# Patient Record
Sex: MALE | Race: WHITE | Employment: OTHER | RURAL
[De-identification: names, ages, dates, MRNs, and addresses within clinical notes are randomized per-mention and may not be internally consistent; named-entity substitution may affect disease eponyms.]

---

## 2017-05-31 ENCOUNTER — OFFICE VISIT (OUTPATIENT)
Dept: INTERNAL MEDICINE CLINIC | Age: 82
End: 2017-05-31

## 2017-05-31 VITALS
WEIGHT: 135 LBS | DIASTOLIC BLOOD PRESSURE: 73 MMHG | RESPIRATION RATE: 16 BRPM | OXYGEN SATURATION: 98 % | TEMPERATURE: 98.5 F | HEART RATE: 70 BPM | BODY MASS INDEX: 19.99 KG/M2 | SYSTOLIC BLOOD PRESSURE: 139 MMHG | HEIGHT: 69 IN

## 2017-05-31 DIAGNOSIS — F02.80 DEMENTIA ASSOCIATED WITH OTHER UNDERLYING DISEASE WITHOUT BEHAVIORAL DISTURBANCE (HCC): Primary | ICD-10-CM

## 2017-05-31 NOTE — MR AVS SNAPSHOT
Visit Information Date & Time Provider Department Dept. Phone Encounter #  
 5/31/2017  1:15 PM Andre Puente  [facility] [id number] Follow-up Instructions Return in about 5 months (around 10/31/2017) for routine follow up. Upcoming Health Maintenance Date Due  
 GLAUCOMA SCREENING Q2Y 4/26/1999 Pneumococcal 65+ Low/Medium Risk (2 of 2 - PCV13) 11/13/2015 INFLUENZA AGE 9 TO ADULT 8/1/2017 MEDICARE YEARLY EXAM 10/27/2017 DTaP/Tdap/Td series (2 - Td) 5/11/2025 Allergies as of 5/31/2017  Review Complete On: 5/31/2017 By: Zahra Cheatham LPN Severity Noted Reaction Type Reactions Pcn [Penicillins] Medium 05/22/2012   Topical Hives Pt says not allergic Beef Containing Products  02/24/2016    Rash Current Immunizations  Reviewed on 10/26/2016 Name Date Influenza High Dose Vaccine PF 10/26/2016 Influenza Vaccine 10/17/2013 Influenza Vaccine Geofm Rave) 10/26/2015, 11/13/2014 Influenza Vaccine Split 12/6/2012, 11/16/2011, 10/12/2010 Pneumococcal Polysaccharide (PPSV-23) 11/13/2014 Tdap 5/11/2015 Not reviewed this visit You Were Diagnosed With   
  
 Codes Comments Dementia associated with other underlying disease without behavioral disturbance    -  Primary ICD-10-CM: F02.80 ICD-9-CM: 294.10 Vitals BP Pulse Temp Resp Height(growth percentile) Weight(growth percentile) 139/73 (BP 1 Location: Right arm, BP Patient Position: Sitting) 70 98.5 °F (36.9 °C) (Oral) 16 5' 9\" (1.753 m) 135 lb (61.2 kg) SpO2 BMI Smoking Status 98% 19.94 kg/m2 Never Smoker Vitals History BMI and BSA Data Body Mass Index Body Surface Area 19.94 kg/m 2 1.73 m 2 Preferred Pharmacy Pharmacy Name Phone [address] [facility] [phone] Your Updated Medication List  
  
   
 This list is accurate as of: 5/31/17  1:45 PM.  Always use your most recent med list.  
  
  
  
  
 fluticasone 50 mcg/actuation nasal spray Commonly known as:  Wilbern Jazmín 2 Sprays by Both Nostrils route daily. PROSTATE 2.4 PO Take  by mouth. Follow-up Instructions Return in about 5 months (around 10/31/2017) for routine follow up. Introducing [facility]! Bri Painter introduces Contrib patient portal. Now you can access parts of your medical record, email your doctor's office, and request medication refills online. 1. In your internet browser, go to [email or web address] 2. Click on the First Time User? Click Here link in the Sign In box. You will see the New Member Sign Up page. 3. Enter your Contrib Access Code exactly as it appears below. You will not need to use this code after youve completed the sign-up process. If you do not sign up before the expiration date, you must request a new code. · Contrib Access Code: [email or web address] Expires: 8/29/2017 12:48 PM 
 
4. Enter the last four digits of your Social Security Number (xxxx) and Date of Birth (mm/dd/yyyy) as indicated and click Submit. You will be taken to the next sign-up page. 5. Create a Contrib ID. This will be your Contrib login ID and cannot be changed, so think of one that is secure and easy to remember. 6. Create a Contrib password. You can change your password at any time. 7. Enter your Password Reset Question and Answer. This can be used at a later time if you forget your password. 8. Enter your e-mail address. You will receive e-mail notification when new information is available in [address] 9. Click Sign Up. You can now view and download portions of your medical record. 10. Click the Download Summary menu link to download a portable copy of your medical information.  
 
If you have questions, please visit the Frequently Asked Questions section of the [email or web address]. Remember, [employer] is NOT to be used for urgent needs. For medical emergencies, dial 911. Now available from your iPhone and Android! Please provide this summary of care documentation to your next provider. Your primary care clinician is listed as Darcy Mendez. If you have any questions after today's visit, please call [phone].

## 2017-05-31 NOTE — PROGRESS NOTES
Chief Complaint   Patient presents with    Hypertension     I have reviewed the patient's medical history in detail and updated the computerized patient record. Health Maintenance reviewed. 1. Have you been to the ER, urgent care clinic since your last visit? Hospitalized since your last visit?no    2. Have you seen or consulted any other health care providers outside of the [address] [place] [address] since your last visit? Include any pap smears or colon screening. No    Do you have an [address] in place in the event that you have a healthcare crisis that could impact your decision making as it pertains to your health?no  Would you like information about the [address] [place] [address] given. no

## 2017-05-31 NOTE — PROGRESS NOTES
Subjective:     Sienna Souza is a 80 y.o. male who presents for follow up of hypertension and dementia, we stopped his BP pills. His wife says he is doing okay, he has no complaints. New concerns: per wife memory issues are stable. .     Current Outpatient Prescriptions   Medication Sig Dispense Refill    fluticasone (FLONASE) 50 mcg/actuation nasal spray 2 Sprays by Both Nostrils route daily. 3 Bottle 3    D3/E/SE/SOY ISOFL/TOCOPH/LYCOP (PROSTATE 2.4 PO) Take  by mouth. Allergies   Allergen Reactions    Pcn [Penicillins] Hives     Pt says not allergic    Beef Containing Products Rash       Diet and Lifestyle: nonsmoker    Cardiovascular ROS: not taking medications regularly as instructed, no medication side effects noted, no TIA's, no chest pain on exertion, no dyspnea on exertion, no swelling of ankles. Review of Systems, additional:  Pertinent items are noted in HPI.       Social History   Substance Use Topics    Smoking status: Never Smoker    Smokeless tobacco: Never Used    Alcohol use No        Lab Results  Component Value Date/Time   Cholesterol, total 171 10/26/2016 09:09 AM   HDL Cholesterol 52 10/26/2016 09:09 AM   LDL, calculated 98 10/26/2016 09:09 AM   Triglyceride 107 10/26/2016 09:09 AM       Lab Results  Component Value Date/Time   GFR est  10/26/2016 09:09 AM   GFR est non-AA 91 10/26/2016 09:09 AM   Creatinine 0.65 10/26/2016 09:09 AM   BUN 17 10/26/2016 09:09 AM   Sodium 140 10/26/2016 09:09 AM   Potassium 4.4 10/26/2016 09:09 AM   Chloride 96 10/26/2016 09:09 AM   CO2 28 10/26/2016 09:09 AM      Lab Results   Component Value Date/Time    Glucose 67 10/26/2016 09:09 AM             Objective:     Physical exam significant for the following: thin NAD  Visit Vitals    /73 (BP 1 Location: Right arm, BP Patient Position: Sitting)    Pulse 70    Temp 98.5 °F (36.9 °C) (Oral)    Resp 16    Ht 5' 9\" (1.753 m)    Wt 135 lb (61.2 kg)    SpO2 98%    BMI 19.94 kg/m2     Appearance: alert, well appearing, and in no distress. General exam: CVS exam BP noted to be well controlled today in office, S1, S2 normal, no gallop, no murmur, chest clear, no JVD, no HSM, no edema. .   Assessment/Plan:     hypertension resolved  demantia stable  . Follow-up Disposition:  Return in about 5 months (around 10/31/2017) for routine follow up.

## 2017-08-30 ENCOUNTER — DOCUMENTATION ONLY (OUTPATIENT)
Dept: INTERNAL MEDICINE CLINIC | Age: 82
End: 2017-08-30

## 2017-08-30 ENCOUNTER — OFFICE VISIT (OUTPATIENT)
Dept: INTERNAL MEDICINE CLINIC | Age: 82
End: 2017-08-30

## 2017-08-30 VITALS
WEIGHT: 133 LBS | SYSTOLIC BLOOD PRESSURE: 140 MMHG | TEMPERATURE: 96.6 F | RESPIRATION RATE: 18 BRPM | HEIGHT: 69 IN | BODY MASS INDEX: 19.7 KG/M2 | HEART RATE: 74 BPM | OXYGEN SATURATION: 98 % | DIASTOLIC BLOOD PRESSURE: 69 MMHG

## 2017-08-30 DIAGNOSIS — I45.10 RIGHT BUNDLE BRANCH BLOCK: ICD-10-CM

## 2017-08-30 DIAGNOSIS — J30.1 SEASONAL ALLERGIC RHINITIS DUE TO POLLEN: ICD-10-CM

## 2017-08-30 DIAGNOSIS — Z01.818 PREOPERATIVE GENERAL PHYSICAL EXAMINATION: Primary | ICD-10-CM

## 2017-08-30 DIAGNOSIS — G30.9 ALZHEIMER'S DEMENTIA WITHOUT BEHAVIORAL DISTURBANCE, UNSPECIFIED TIMING OF DEMENTIA ONSET: ICD-10-CM

## 2017-08-30 DIAGNOSIS — F02.80 ALZHEIMER'S DEMENTIA WITHOUT BEHAVIORAL DISTURBANCE, UNSPECIFIED TIMING OF DEMENTIA ONSET: ICD-10-CM

## 2017-08-30 NOTE — MR AVS SNAPSHOT
Visit Information Date & Time Provider Department Dept. Phone Encounter #  
 8/30/2017  9:15 AM Christoph Saleh MD [facility] [id number] Follow-up Instructions Return if symptoms worsen or fail to improve. Your Appointments 9/18/2017  3:15 PM  
Office Visit with Alex Carrillo MD  
[facility] [address] [place] [address]) Appt Note: r/d by Dr. Sheryle Kub, abn ekg, pt needing eye surgery soon,pradeepa  
 [address] [facility]  
[phone] [address] [facility]  
  
    
 11/15/2017  3:30 PM  
PHYSICAL PRE OP with Christoph Saleh MD  
[email or web address] ([facility]) Appt Note: mwv $0 cp lsh 5/31/17  
 [address] [place] [address] Road. [address] [place] [address]  
[phone]  
  
   
 [address] [facility] [address] Upcoming Health Maintenance Date Due  
 GLAUCOMA SCREENING Q2Y 4/26/1999 Pneumococcal 65+ Low/Medium Risk (2 of 2 - PCV13) 11/13/2015 INFLUENZA AGE 9 TO ADULT 8/1/2017 MEDICARE YEARLY EXAM 10/27/2017 DTaP/Tdap/Td series (2 - Td) 5/11/2025 Allergies as of 8/30/2017  Review Complete On: 8/30/2017 By: Kristie Hernandez LPN Severity Noted Reaction Type Reactions Pcn [Penicillins] Medium 05/22/2012   Topical Hives Pt says not allergic Beef Containing Products  02/24/2016    Rash Current Immunizations  Reviewed on 10/26/2016 Name Date Influenza High Dose Vaccine PF 10/26/2016 Influenza Vaccine 10/17/2013 Influenza Vaccine Page Echevaria) 10/26/2015, 11/13/2014 Influenza Vaccine Split 12/6/2012, 11/16/2011, 10/12/2010 Pneumococcal Polysaccharide (PPSV-23) 11/13/2014 Tdap 5/11/2015 Not reviewed this visit You Were Diagnosed With   
  
 Codes Comments Preoperative general physical examination    -  Primary ICD-10-CM: R00.433 ICD-9-CM: V72.83   
 Seasonal allergic rhinitis due to pollen     ICD-10-CM: J30.1 ICD-9-CM: 477.0 Right bundle branch block     ICD-10-CM: I45.10 ICD-9-CM: 426.4 Alzheimer's dementia without behavioral disturbance, unspecified timing of dementia onset     ICD-10-CM: G30.9, F02.80 ICD-9-CM: 331.0, 294.10 Vitals BP Pulse Temp Resp Height(growth percentile) Weight(growth percentile) 140/69 (BP 1 Location: Left arm, BP Patient Position: Sitting) 74 96.6 °F (35.9 °C) (Oral) 18 5' 9\" (1.753 m) 133 lb (60.3 kg) SpO2 BMI Smoking Status 98% 19.64 kg/m2 Never Smoker Vitals History BMI and BSA Data Body Mass Index Body Surface Area 19.64 kg/m 2 1.71 m 2 Preferred Pharmacy Pharmacy Name Phone [address], [facility] [phone] Your Updated Medication List  
  
   
This list is accurate as of: 8/30/17 10:09 AM.  Always use your most recent med list.  
  
  
  
  
 fluticasone 50 mcg/actuation nasal spray Commonly known as:  Keyanna Mak 2 Sprays by Both Nostrils route daily. PROSTATE 2.4 PO Take  by mouth. We Performed the Following AMB POC EKG ROUTINE W/ 12 LEADS, INTER & REP [[address] CPT(R)] REFERRAL TO CARDIOLOGY [UZI55 Custom] Follow-up Instructions Return if symptoms worsen or fail to improve. Referral Information Referral ID Referred By Referred To  
  
 [id number] Rob FLETCHER MD   
   [address] Phone: [phone] Fax: [phone] Visits Status Start Date End Date 1 New Request 8/30/17 8/30/18 If your referral has a status of pending review or denied, additional information will be sent to support the outcome of this decision. Introducing [facility]!    
 Danae Stone introduces [employer] patient portal. Now you can access parts of your medical record, email your doctor's office, and request medication refills online. 1. In your internet browser, go to [email or web address] 2. Click on the First Time User? Click Here link in the Sign In box. You will see the New Member Sign Up page. 3. Enter your [employer] Access Code exactly as it appears below. You will not need to use this code after youve completed the sign-up process. If you do not sign up before the expiration date, you must request a new code. · [employer] Access Code: [email or web address] Expires: 11/28/2017  8:50 AM 
 
4. Enter the last four digits of your Social Security Number (xxxx) and Date of Birth (mm/dd/yyyy) as indicated and click Submit. You will be taken to the next sign-up page. 5. Create a [employer] ID. This will be your [employer] login ID and cannot be changed, so think of one that is secure and easy to remember. 6. Create a [employer] password. You can change your password at any time. 7. Enter your Password Reset Question and Answer. This can be used at a later time if you forget your password. 8. Enter your e-mail address. You will receive e-mail notification when new information is available in [address] 9. Click Sign Up. You can now view and download portions of your medical record. 10. Click the Download Summary menu link to download a portable copy of your medical information. If you have questions, please visit the Frequently Asked Questions section of the [employer] website. Remember, [employer] is NOT to be used for urgent needs. For medical emergencies, dial 911. Now available from your iPhone and Android! Please provide this summary of care documentation to your next provider. Your primary care clinician is listed as Gisela Murguia. If you have any questions after today's visit, please call [phone].

## 2017-08-30 NOTE — PROGRESS NOTES
Preoperative Evaluation    Date of Exam: 2017    Shannan Mcgraw is a 80 y.o. male (:1934) who presents for preoperative evaluation. Can't drive at night and [employer] has send him to have his catarcts done. Memory issues are stable per wife. 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  D3/E/SE/SOY ISOFL/TOCOPH/LYCOP (PROSTATE 2.4 PO) Take  by mouth. No current facility-administered medications for this visit.       Surgical History:     Past Surgical History:   Procedure Laterality Date    HX CAROTID ENDARTERECTOMY      Rt.    HX HERNIA REPAIR  1985    WY COLONOSCOPY FLX DX W/COLLJ SPEC WHEN PFRMD  2010          Social History:     Social History     Social History    Marital status:      Spouse name: N/A    Number of children: 4    Years of education: N/A     Occupational History     Retired     Social History Main Topics    Smoking status: Never Smoker    Smokeless tobacco: Never Used    Alcohol use No    Drug use: None    Sexual activity: Not Currently     Other Topics Concern    None     Social History Narrative    Lives with wife       Anesthesia Complications: None  History of abnormal bleeding : None  History of Blood Transfusions: no  Health Care Directive or Living Will: no    Objective:     Visit Vitals    /69 (BP 1 Location: Left arm, BP Patient Position: Sitting)    Pulse 74    Temp 96.6 °F (35.9 °C) (Oral)    Resp 18    Ht 5' 9\" (1.753 m)    Wt 133 lb (60.3 kg)    SpO2 98%    BMI 19.64 kg/m2     WD WN male NAD elderly reasonable historian usu MS  Heart RRR without murmers clicks or rubs  Lungs CTA  Abdo soft nontender  Ext no edema        DIAGNOSTICS:   1. EKG: EKG FINDINGS - normal sinus rhythm, RBBB  3. Labs:   Lab Results  Component Value Date/Time   Cholesterol, total 171 10/26/2016 09:09 AM   HDL Cholesterol 52 10/26/2016 09:09 AM   LDL, calculated 98 10/26/2016 09:09 AM   Triglyceride 107 10/26/2016 09:09 AM     Lab Results  Component Value Date/Time   GFR est non-AA 91 10/26/2016 09:09 AM   GFR est  10/26/2016 09:09 AM   Creatinine 0.65 10/26/2016 09:09 AM   BUN 17 10/26/2016 09:09 AM   Sodium 140 10/26/2016 09:09 AM   Potassium 4.4 10/26/2016 09:09 AM   Chloride 96 10/26/2016 09:09 AM   CO2 28 10/26/2016 09:09 AM     Lab Results   Component Value Date/Time    Glucose 67 10/26/2016 09:09 AM              IMPRESSION:   RBBB on EKG, needs to see cards for further clearance, will cancel his surgery until cards clears him      ICD-10-CM ICD-9-CM    1. Preoperative general physical examination Z01.818 V72.83 AMB POC EKG ROUTINE W/ 12 LEADS, INTER & REP      REFERRAL TO CARDIOLOGY   2. Seasonal allergic rhinitis due to pollen J30.1 477.0    3. Right bundle branch block I45.10 426.4 REFERRAL TO CARDIOLOGY   4.  Alzheimer's dementia without behavioral disturbance, unspecified timing of dementia onset G30.9 331.0     F02.80 294.10      Orders Placed This Encounter    REFERRAL TO CARDIOLOGY     Referral Priority:   Routine     Referral Type:   Consultation     Referral Reason:   Specialty Services Required     Referred to Provider:   Marquez Montaño MD    AMB POC EKG ROUTINE W/ 12 LEADS, INTER & REP     Order Specific Question:   Reason for Exam:     Answer:   pre oip       Surgery should be delayed until cleared by his cardiologist     Karen Fernandez MD   8/30/2017

## 2017-09-05 ENCOUNTER — TELEPHONE (OUTPATIENT)
Dept: INTERNAL MEDICINE CLINIC | Age: 82
End: 2017-09-05

## 2017-09-17 ENCOUNTER — APPOINTMENT (OUTPATIENT)
Dept: GENERAL RADIOLOGY | Age: 82
End: 2017-09-17
Attending: EMERGENCY MEDICINE
Payer: MEDICARE

## 2017-09-17 ENCOUNTER — HOSPITAL ENCOUNTER (EMERGENCY)
Age: 82
Discharge: HOME | End: 2017-09-17
Attending: EMERGENCY MEDICINE
Payer: MEDICARE

## 2017-09-17 VITALS
HEIGHT: 70 IN | HEART RATE: 74 BPM | WEIGHT: 132.06 LBS | TEMPERATURE: 97.5 F | BODY MASS INDEX: 18.91 KG/M2 | RESPIRATION RATE: 17 BRPM | SYSTOLIC BLOOD PRESSURE: 145 MMHG | OXYGEN SATURATION: 99 % | DIASTOLIC BLOOD PRESSURE: 60 MMHG

## 2017-09-17 DIAGNOSIS — I95.1 ORTHOSTATIC SYNCOPE: Primary | ICD-10-CM

## 2017-09-17 LAB
ANION GAP SERPL CALC-SCNC: 6 MMOL/L (ref 5–15)
BUN SERPL-MCNC: 17 MG/DL (ref 6–20)
BUN/CREAT SERPL: 18 (ref 12–20)
CALCIUM SERPL-MCNC: 8.9 MG/DL (ref 8.5–10.1)
CHLORIDE SERPL-SCNC: 104 MMOL/L (ref 97–108)
CO2 SERPL-SCNC: 31 MMOL/L (ref 21–32)
CREAT SERPL-MCNC: 0.96 MG/DL (ref 0.7–1.3)
ERYTHROCYTE [DISTWIDTH] IN BLOOD BY AUTOMATED COUNT: 13.5 % (ref 11.5–14.5)
GLUCOSE SERPL-MCNC: 89 MG/DL (ref 65–100)
HCT VFR BLD AUTO: 42.6 % (ref 36.6–50.3)
HGB BLD-MCNC: 14.5 G/DL (ref 12.1–17)
MAGNESIUM SERPL-MCNC: 2.5 MG/DL (ref 1.6–2.4)
MCH RBC QN AUTO: 31.9 PG (ref 26–34)
MCHC RBC AUTO-ENTMCNC: 34 G/DL (ref 30–36.5)
MCV RBC AUTO: 93.6 FL (ref 80–99)
PLATELET # BLD AUTO: 198 K/UL (ref 150–400)
POTASSIUM SERPL-SCNC: 4.1 MMOL/L (ref 3.5–5.1)
RBC # BLD AUTO: 4.55 M/UL (ref 4.1–5.7)
SODIUM SERPL-SCNC: 141 MMOL/L (ref 136–145)
TROPONIN I SERPL-MCNC: <0.04 NG/ML
TROPONIN I SERPL-MCNC: <0.04 NG/ML
WBC # BLD AUTO: 7.4 K/UL (ref 4.1–11.1)

## 2017-09-17 PROCEDURE — 93005 ELECTROCARDIOGRAM TRACING: CPT

## 2017-09-17 PROCEDURE — 80048 BASIC METABOLIC PNL TOTAL CA: CPT | Performed by: EMERGENCY MEDICINE

## 2017-09-17 PROCEDURE — 99285 EMERGENCY DEPT VISIT HI MDM: CPT

## 2017-09-17 PROCEDURE — 74011250636 HC RX REV CODE- 250/636: Performed by: EMERGENCY MEDICINE

## 2017-09-17 PROCEDURE — 84484 ASSAY OF TROPONIN QUANT: CPT | Performed by: EMERGENCY MEDICINE

## 2017-09-17 PROCEDURE — 85027 COMPLETE CBC AUTOMATED: CPT | Performed by: EMERGENCY MEDICINE

## 2017-09-17 PROCEDURE — 71020 XR CHEST PA LAT: CPT

## 2017-09-17 PROCEDURE — 83735 ASSAY OF MAGNESIUM: CPT | Performed by: EMERGENCY MEDICINE

## 2017-09-17 PROCEDURE — 96361 HYDRATE IV INFUSION ADD-ON: CPT

## 2017-09-17 PROCEDURE — 36415 COLL VENOUS BLD VENIPUNCTURE: CPT | Performed by: EMERGENCY MEDICINE

## 2017-09-17 PROCEDURE — 96360 HYDRATION IV INFUSION INIT: CPT

## 2017-09-17 RX ORDER — ASPIRIN 81 MG/1
81 TABLET ORAL
COMMUNITY

## 2017-09-17 RX ADMIN — SODIUM CHLORIDE 1000 ML: 900 INJECTION, SOLUTION INTRAVENOUS at 12:24

## 2017-09-17 NOTE — ED NOTES
Per wife, who is at bedside, patient was in Religious and suddenly had a syncopal episode. Patient states he did not hit the floor, as he fell in his chair and Yazidism assisted him with sitting up in the chair. Patient denies hitting his head. His wife reports increased stress in patient's life because he recently failed his EKG and has to follow up with a cardiologist tomorrow. Patient resting comfortably in stretcher with call bell within reach. Side rails up x2, placed on the monitor x3. Wife and daughter at bedside.

## 2017-09-17 NOTE — ED PROVIDER NOTES
HPI Comments: Frandy Carrera is a 80 y.o. male with a pertinent PMHx of CA and HLD who presents ambulatory to the ED for evaluation after a syncopal episode which occurred this morning. Pt explains that he was attending [employer] service when he was attempting to stand at the [facility]. He notes that he almost fell while attempting to stand and his wife caught him before he hit the ground. He states that he was diaphoretic at the time of the incident, but he denies feeling lightheaded or short of breath during, before, or after the episode. He notes that the EMT at his [employer] noticed that his BP had dropped significantly during the episode. Pt states that he is currently asymptomatic. He reports that he was recently evaluated by his PCP who referred him to Dr. Smith White for a failed EKG. He notes that he has an appointment with Dr. Ju Ospina tomorrow. Pt reports that he has been having normal PO intake. Pt specifically denies HA, chest pain, lightheadedness, SOB, N/V, nor hitting his head during the episode. Social hx: - Tobacco use, - EtOH use    PCP: Joy Walters MD  Cardiology: Smith White MD    There are no other complaints, changes or physical findings at this time. The history is provided by the patient. No  was used.         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    HX HERNIA REPAIR  6/1/1985    GA COLONOSCOPY FLX DX W/COLLJ SPEC WHEN PFRMD  6/30/2010              Family History:   Problem Relation Age of Onset    Stroke Mother     Heart Disease Mother     Heart Disease Father        Social History     Social History    Marital status:      Spouse name: N/A    Number of children: 4    Years of education: N/A     Occupational History    Retired     Social History Main Topics    Smoking status: Never Smoker    Smokeless tobacco: Never Used    Alcohol use No    Drug use: Not on file    Sexual activity: Not Currently     Other Topics Concern    Not on file     Social History Narrative    Lives with wife         ALLERGIES: Pcn [penicillins] and Beef containing products    Review of Systems   Constitutional: Negative for chills and fever. HENT: Negative for congestion, ear pain, rhinorrhea, sore throat and trouble swallowing. Eyes: Negative for visual disturbance. Respiratory: Negative for cough, chest tightness and shortness of breath. Cardiovascular: Negative for chest pain and palpitations. Gastrointestinal: Negative for abdominal pain, blood in stool, constipation, diarrhea, nausea and vomiting. Genitourinary: Negative for decreased urine volume, difficulty urinating, dysuria and frequency. Musculoskeletal: Negative for back pain and neck pain. Skin: Negative for color change and rash. Neurological: Positive for syncope. Negative for dizziness, weakness, light-headedness and headaches. Patient Vitals for the past 12 hrs:   Temp Pulse Resp BP SpO2   09/17/17 1402 - 68 16 123/43 98 %   09/17/17 1158 - 67 15 131/70 96 %   09/17/17 1033 97.5 °F (36.4 °C) 64 16 166/83 96 %              Physical Exam   Constitutional: He is oriented to person, place, and time. Vital signs are normal. He appears well-developed and well-nourished. He does not appear ill. No distress. HENT:   Mouth/Throat: Oropharynx is clear and moist.   Eyes: Conjunctivae are normal.   Neck: Neck supple. Cardiovascular: Normal rate and regular rhythm. Pulmonary/Chest: Effort normal and breath sounds normal. No accessory muscle usage. No respiratory distress. Abdominal: Soft. He exhibits no distension. There is no tenderness. Lymphadenopathy:     He has no cervical adenopathy. Neurological: He is alert and oriented to person, place, and time.  He has normal strength. No cranial nerve deficit or sensory deficit. Skin: Skin is warm and dry. Nursing note and vitals reviewed. MDM  Number of Diagnoses or Management Options  Orthostatic syncope:   Diagnosis management comments: DDx: vasovagal syncope, orthostatic syncope, coronary disease, dehydration, electrolyte abnormality    Follow-up scheduled tomorrow with cardiology. Considering great follow-up and no acute findings today, as well as evidence that points more towards orthostatic syncope, I will discharge him home today. Amount and/or Complexity of Data Reviewed  Clinical lab tests: ordered and reviewed  Tests in the radiology section of CPT®: ordered and reviewed  Tests in the medicine section of CPT®: ordered and reviewed  Review and summarize past medical records: yes    Patient Progress  Patient progress: stable    ED Course       Procedures     EKG interpretation: (Preliminary) 10:24  Rhythm: normal sinus rhythm; and regular . Rate (approx.): 65 bpm; Axis: normal; TX interval: normal; QRS interval: normal ; ST/T wave: normal; Other findings: RBBB.   Written by Milton Stubbs, ED Scribe, as dictated by Suad Joseph MD.      LABORATORY TESTS:  Recent Results (from the past 12 hour(s))   EKG, 12 LEAD, INITIAL    Collection Time: 09/17/17 10:24 AM   Result Value Ref Range    Ventricular Rate 65 BPM    Atrial Rate 65 BPM    P-R Interval 186 ms    QRS Duration 140 ms    Q-T Interval 414 ms    QTC Calculation (Bezet) 430 ms    Calculated P Axis 67 degrees    Calculated R Axis 88 degrees    Calculated T Axis 63 degrees    Diagnosis       Normal sinus rhythm  Right bundle branch block  Abnormal ECG  When compared with ECG of 11-NOV-2002 10:17,  Previous ECG has undetermined rhythm, needs review     CBC W/O DIFF    Collection Time: 09/17/17 11:48 AM   Result Value Ref Range    WBC 7.4 4.1 - 11.1 K/uL    RBC 4.55 4.10 - 5.70 M/uL    HGB 14.5 12.1 - 17.0 g/dL    HCT 42.6 36.6 - 50.3 %    MCV 93.6 80.0 - 99.0 FL    MCH 31.9 26.0 - 34.0 PG    MCHC 34.0 30.0 - 36.5 g/dL    RDW 13.5 11.5 - 14.5 %    PLATELET 137 925 - 524 K/uL   MAGNESIUM    Collection Time: 09/17/17 11:48 AM   Result Value Ref Range    Magnesium 2.5 (H) 1.6 - 2.4 mg/dL   METABOLIC PANEL, BASIC    Collection Time: 09/17/17 11:48 AM   Result Value Ref Range    Sodium 141 136 - 145 mmol/L    Potassium 4.1 3.5 - 5.1 mmol/L    Chloride 104 97 - 108 mmol/L    CO2 31 21 - 32 mmol/L    Anion gap 6 5 - 15 mmol/L    Glucose 89 65 - 100 mg/dL    BUN 17 6 - 20 MG/DL    Creatinine 0.96 0.70 - 1.30 MG/DL    BUN/Creatinine ratio 18 12 - 20      GFR est AA >60 >60 ml/min/1.73m2    GFR est non-AA >60 >60 ml/min/1.73m2    Calcium 8.9 8.5 - 10.1 MG/DL   TROPONIN I    Collection Time: 09/17/17 11:48 AM   Result Value Ref Range    Troponin-I, Qt. <0.04 <0.05 ng/mL   TROPONIN I    Collection Time: 09/17/17  1:49 PM   Result Value Ref Range    Troponin-I, Qt. <0.04 <0.05 ng/mL       IMAGING RESULTS:    CXR Results  (Last 48 hours)               09/17/17 1136  XR CHEST PA LAT Final result    Impression:  IMPRESSION: No acute disease. Narrative:  INDICATION: syncope       EXAM: CXR 2 View       FINDINGS: Frontal and lateral views of the chest show lungs are hyperinflated,   possibly emphysematous, without acute process seen. Heart size is normal. There   is no pulmonary edema. There is no evident pneumothorax, adenopathy or effusion. MEDICATIONS GIVEN:  Medications   sodium chloride 0.9 % bolus infusion 1,000 mL (0 mL IntraVENous IV Completed 9/17/17 2039)       IMPRESSION:  1. Orthostatic syncope        PLAN:  1. Follow-up Information     Follow up With Details Comments [address], [place] Go in 1 day  [address] [facility]  [phone]          Return to ED if worse     DISCHARGE NOTE  3:00 PM  The patient has been re-evaluated and is ready for discharge.  Reviewed available results with patient. Counseled patient on diagnosis and care plan. Patient has expressed understanding, and all questions have been answered. Patient agrees with plan and agrees to follow up as recommended, or return to the ED if their symptoms worsen. Discharge instructions have been provided and explained to the patient, along with reasons to return to the ED. ATTESTATION:  This note is prepared by Emiliana Bustamante, acting as Scribe for Shalom Vivas MD.    Shalom Vivas MD: The scribe's documentation has been prepared under my direction and personally reviewed by me in its entirety. I confirm that the note above accurately reflects all work, treatment, procedures, and medical decision making performed by me. This note will not be viewable in [address]

## 2017-09-17 NOTE — DISCHARGE INSTRUCTIONS
Orthostatic Hypotension: Care Instructions  Your Care Instructions  Orthostatic hypotension is a quick drop in blood pressure. It happens when you get up from sitting or lying down. You may feel faint, lightheaded, or dizzy. When a person sits up or stands up, the body changes the way it pumps blood. This can slow the flow of blood to the brain for a very short time. And that can make you feel lightheaded. Many medicines can cause this problem, especially in older people. Lack of fluids (dehydration) or illnesses such as diabetes or heart disease also can cause it. Follow-up care is a key part of your treatment and safety. Be sure to make and go to all appointments, and call your doctor if you are having problems. It's also a good idea to know your test results and keep a list of the medicines you take. How can you care for yourself at home? · Tell your doctor about any problems you have with your medicines. · If your doctor prescribes medicine to help prevent a low blood pressure problem, take it exactly as prescribed. Call your doctor if you think you are having a problem with your medicine. · Drink plenty of fluids, enough so that your urine is light yellow or clear like water. Choose water and other caffeine-free clear liquids. If you have kidney, heart, or liver disease and have to limit fluids, talk with your doctor before you increase the amount of fluids you drink. · Limit or avoid alcohol and caffeine. · Get up slowly from bed or after sitting for a long time. If you are in bed, roll to your side and swing your legs over the edge of the bed and onto the floor. Push your body up to a sitting position. Wait for a while before you slowly stand up. If you are dizzy or lightheaded, sit or lie down. When should you call for help? Call 911 anytime you think you may need emergency care. For example, call if:  · You passed out (lost consciousness).   Watch closely for changes in your health, and be sure to contact your doctor if:  · You do not get better as expected. Where can you learn more? Go to http://frandy-jose.info/. Enter M757 in the search box to learn more about \"Orthostatic Hypotension: Care Instructions. \"  Current as of: April 3, 2017  Content Version: 11.3  © 2996-0836 [employer]. Care instructions adapted under license by [employer] (which disclaims liability or warranty for this information). If you have questions about a medical condition or this instruction, always ask your healthcare professional. [address] any warranty or liability for your use of this information.

## 2017-09-17 NOTE — ED NOTES
MD Lisa Benton has reviewed discharge instructions with the patient. The patient verbalized understanding. Pt discharged with written instructions. No further concerns at this time. IV removed. Pt given prescriptions and wheeled out to exit with family.

## 2017-09-18 ENCOUNTER — OFFICE VISIT (OUTPATIENT)
Dept: CARDIOLOGY CLINIC | Age: 82
End: 2017-09-18

## 2017-09-18 VITALS
SYSTOLIC BLOOD PRESSURE: 118 MMHG | WEIGHT: 135.7 LBS | OXYGEN SATURATION: 98 % | RESPIRATION RATE: 18 BRPM | HEART RATE: 72 BPM | BODY MASS INDEX: 19.43 KG/M2 | DIASTOLIC BLOOD PRESSURE: 60 MMHG | HEIGHT: 70 IN

## 2017-09-18 DIAGNOSIS — R55 SYNCOPE, UNSPECIFIED SYNCOPE TYPE: ICD-10-CM

## 2017-09-18 DIAGNOSIS — G30.9 ALZHEIMER'S DEMENTIA WITHOUT BEHAVIORAL DISTURBANCE, UNSPECIFIED TIMING OF DEMENTIA ONSET: ICD-10-CM

## 2017-09-18 DIAGNOSIS — R01.1 HEART MURMUR: ICD-10-CM

## 2017-09-18 DIAGNOSIS — F02.80 ALZHEIMER'S DEMENTIA WITHOUT BEHAVIORAL DISTURBANCE, UNSPECIFIED TIMING OF DEMENTIA ONSET: ICD-10-CM

## 2017-09-18 DIAGNOSIS — I65.29 STENOSIS OF CAROTID ARTERY, UNSPECIFIED LATERALITY: ICD-10-CM

## 2017-09-18 DIAGNOSIS — I45.10 RBBB: Primary | ICD-10-CM

## 2017-09-18 PROBLEM — R94.31 ABNORMAL EKG: Status: ACTIVE | Noted: 2017-09-18

## 2017-09-18 LAB
ATRIAL RATE: 65 BPM
CALCULATED P AXIS, ECG09: 67 DEGREES
CALCULATED R AXIS, ECG10: 88 DEGREES
CALCULATED T AXIS, ECG11: 63 DEGREES
DIAGNOSIS, 93000: NORMAL
P-R INTERVAL, ECG05: 186 MS
Q-T INTERVAL, ECG07: 414 MS
QRS DURATION, ECG06: 140 MS
QTC CALCULATION (BEZET), ECG08: 430 MS
VENTRICULAR RATE, ECG03: 65 BPM

## 2017-09-18 NOTE — PROGRESS NOTES
Subjective/HPI:     Anthony Smith is a 80 y.o. male is here for new patient consultation. The patient has medical hx significant for carotid artery stenosis with endarterectomy many years ago per pt/wife. The pt presents today for cardiac clearance to have cataract surgery. He was noted on his ekg to have a RBBB without hx of cardiac w/u. This was further complicated by a near syncopal event yesterday while in [employer]. He was standing up in the pew, became unbalanced. He had an EMT in the pew behind him who checked BP and said it was low, he was sweating/clammy and he went to ED for further evaluation. W/u in ED was neg and he was felt to likely have had an orthostatic event. In general he has been healthy, on no meds, and has not had complaints of CP/SOB, palpitations, dizziness, edema, orthopnea, or PND. Family med hx significant for CAD. 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    HX HERNIA REPAIR  6/1/1985    AL COLONOSCOPY FLX DX W/COLLJ SPEC WHEN PFRMD  6/30/2010          Allergies   Allergen Reactions    Pcn [Penicillins] Hives     Pt says not allergic    Beef Containing Products Rash      Family History   Problem Relation Age of Onset    Stroke Mother     Heart Disease Mother     Heart Disease Father       Current Outpatient Prescriptions   Medication Sig    aspirin delayed-release 81 mg tablet Take 81 mg by mouth daily.     fluticasone (FLONASE) 50 mcg/actuation nasal spray 2 Sprays by Both Nostrils route daily. No current facility-administered medications for this visit. Vitals:    09/18/17 1441 09/18/17 1456   BP: 120/60 118/60   Pulse: 72    Resp: 18    SpO2: 98%    Weight: 135 lb 11.2 oz (61.6 kg)    Height: 5' 10\" (1.778 m)        I have reviewed the nurses notes, vitals, problem list, allergy list, medical history, family, social history and medications. Review of Symptoms:  General: Pt denies excessive weight gain or loss. HEENT: +cataract right eye, denies headaches, epistaxis and difficulty swallowing. Respiratory: Denies shortness of breath, SIMPSON, wheezing or stridor. Cardiovascular: Denies precordial pain, palpitations, edema or PND  Gastrointestinal: Denies poor appetite, indigestion, abdominal pain or blood in stool  Urinary: Denies dysuria, pyuria  Musculoskeletal: Denies pain or swelling from muscles or joints  Neurologic: Denies tremor, paresthesias, +near syncope  Skin: Denies rash, itching or texture change. Psych: Denies depression        Physical Exam:      General: Well developed, cooperative, alert in no acute distress, appears states age. HEENT: Supple, No carotid bruits, no JVD, trach is midline. PERRL, EOM intact  Heart:  Normal S1/S2 negative S3 or S4. Regular,+JOY, no gallop or rub.   Respiratory: Clear bilaterally x 4, no wheezing or rales  Abdomen:   Soft, non-tender, no masses, bowel sounds are active.   Extremities:  No edema, normal cap refill, no cyanosis, atraumatic. Neuro: A&Ox3, speech clear, gait stable. Skin: Skin color is normal. No rashes or lesions.  Non diaphoretic  Vascular: 2+ pulses symmetric in all extremities    Cardiographics    ECG: SR, HR 68  RBBB    Results for orders placed or performed during the hospital encounter of 09/17/17   EKG, 12 LEAD, INITIAL   Result Value Ref Range    Ventricular Rate 65 BPM    Atrial Rate 65 BPM    P-R Interval 186 ms    QRS Duration 140 ms    Q-T Interval 414 ms    QTC Calculation (Bezet) 430 ms    Calculated P Axis 67 degrees    Calculated R Axis 88 degrees    Calculated T Axis 63 degrees    Diagnosis       Normal sinus rhythm  Right bundle branch block  Abnormal ECG  When compared with ECG of 11-NOV-2002 10:17,  Previous ECG has undetermined rhythm, needs review  Confirmed by Janae Abdullahi ([id number]) on 9/18/2017 3:26:06 PM           Cardiology Labs:  Lab Results   Component Value Date/Time    Cholesterol, total 171 10/26/2016 09:09 AM    HDL Cholesterol 52 10/26/2016 09:09 AM    LDL, calculated 98 10/26/2016 09:09 AM    Triglyceride 107 10/26/2016 09:09 AM       Lab Results   Component Value Date/Time    Sodium 141 09/17/2017 11:48 AM    Potassium 4.1 09/17/2017 11:48 AM    Chloride 104 09/17/2017 11:48 AM    CO2 31 09/17/2017 11:48 AM    Anion gap 6 09/17/2017 11:48 AM    Glucose 89 09/17/2017 11:48 AM    BUN 17 09/17/2017 11:48 AM    Creatinine 0.96 09/17/2017 11:48 AM    BUN/Creatinine ratio 18 09/17/2017 11:48 AM    GFR est AA >60 09/17/2017 11:48 AM    GFR est non-AA >60 09/17/2017 11:48 AM    Calcium 8.9 09/17/2017 11:48 AM    AST (SGOT) 27 10/26/2015 10:37 AM    Alk. phosphatase 74 10/26/2015 10:37 AM    Protein, total 6.5 10/26/2015 10:37 AM    Albumin 4.4 10/26/2015 10:37 AM    A-G Ratio 2.1 10/26/2015 10:37 AM    ALT (SGPT) 12 10/26/2015 10:37 AM           Assessment:     Assessment:      Diagnoses and all orders for this visit:    1. RBBB  -     AMB POC EKG ROUTINE W/ 12 LEADS, INTER & REP  -     2D ECHO COMPLETE ADULT (TTE) W OR WO CONTR; Future    2. Syncope, unspecified syncope type  -     AMB POC EKG ROUTINE W/ 12 LEADS, INTER & REP  -     2D ECHO COMPLETE ADULT (TTE) W OR WO CONTR; Future    3. Stenosis of carotid artery, unspecified laterality  -     AMB POC EKG ROUTINE W/ 12 LEADS, INTER & REP  -     2D ECHO COMPLETE ADULT (TTE) W OR WO CONTR; Future    4.  Alzheimer's dementia without behavioral disturbance, unspecified timing of dementia onset  -     AMB POC EKG ROUTINE W/ 12 LEADS, INTER & REP  -     2D ECHO COMPLETE ADULT (TTE) W OR WO CONTR; Future    5. Heart murmur  -     AMB POC EKG ROUTINE W/ 12 LEADS, INTER & REP  -     2D ECHO COMPLETE ADULT (TTE) W OR WO CONTR; Future      Specialty Problems        Cardiology Problems    Stenosis of carotid artery        Abnormal EKG              ICD-10-CM ICD-9-CM    1. RBBB I45.10 426.4 AMB POC EKG ROUTINE W/ 12 LEADS, INTER & REP      2D ECHO COMPLETE ADULT (TTE) W OR WO CONTR   2. Syncope, unspecified syncope type R55 780.2 AMB POC EKG ROUTINE W/ 12 LEADS, INTER & REP      2D ECHO COMPLETE ADULT (TTE) W OR WO CONTR   3. Stenosis of carotid artery, unspecified laterality I65.29 433.10 AMB POC EKG ROUTINE W/ 12 LEADS, INTER & REP      2D ECHO COMPLETE ADULT (TTE) W OR WO CONTR   4. Alzheimer's dementia without behavioral disturbance, unspecified timing of dementia onset G30.9 331.0 AMB POC EKG ROUTINE W/ 12 LEADS, INTER & REP    F02.80 294.10 2D ECHO COMPLETE ADULT (TTE) W OR WO CONTR   5. Heart murmur R01.1 785.2 AMB POC EKG ROUTINE W/ 12 LEADS, INTER & REP      2D ECHO COMPLETE ADULT (TTE) W OR WO CONTR     Orders Placed This Encounter    AMB POC EKG ROUTINE W/ 12 LEADS, INTER & REP     Order Specific Question:   Reason for Exam:     Answer:   routine    2D ECHO COMPLETE ADULT (TTE) W OR WO CONTR     Standing Status:   Future     Standing Expiration Date:   3/20/2018     Order Specific Question:   Reason for Exam:     Answer:   RBBB, heart murmur       PLAN:    Patient presents with recent near syncopal event, rbbb and need for cardiac clearance for cataract surgery. He has a heart murmur and has never had a cardiac w/u. BP is normotensive. I will evaluate with an echo. Follow up to discuss if abnormal, if normal he can be cleared as low risk for major adverse cardiac complications during his cataract surgery.     Baron Garfield MD

## 2017-09-18 NOTE — MR AVS SNAPSHOT
Visit Information Date & Time Provider Department Dept. Phone Encounter #  
 9/18/2017  3:15 PM Viki Dupree, [address] [facility] [id number] Your Appointments 11/15/2017  3:30 PM  
PHYSICAL PRE OP with MD Chris House ([facility]) Appt Note: mwv $0 cp lsh 5/31/17  
 [address] [place] [address] Road. [address] [facility] [address]  
[phone]  
  
   
 [address] [place] [address] Road [address] Upcoming Health Maintenance Date Due  
 GLAUCOMA SCREENING Q2Y 4/26/1999 Pneumococcal 65+ Low/Medium Risk (2 of 2 - PCV13) 11/13/2015 INFLUENZA AGE 9 TO ADULT 8/1/2017 MEDICARE YEARLY EXAM 10/27/2017 DTaP/Tdap/Td series (2 - Td) 5/11/2025 Allergies as of 9/18/2017  Review Complete On: 9/18/2017 By: Sonia Troy NP Severity Noted Reaction Type Reactions Pcn [Penicillins] Medium 05/22/2012   Topical Hives Pt says not allergic Beef Containing Products  02/24/2016    Rash Current Immunizations  Reviewed on 10/26/2016 Name Date Influenza High Dose Vaccine PF 10/26/2016 Influenza Vaccine 10/17/2013 Influenza Vaccine Elenora Gemma) 10/26/2015, 11/13/2014 Influenza Vaccine Split 12/6/2012, 11/16/2011, 10/12/2010 Pneumococcal Polysaccharide (PPSV-23) 11/13/2014 Tdap 5/11/2015 Not reviewed this visit You Were Diagnosed With   
  
 Codes Comments RBBB    -  Primary ICD-10-CM: I45.10 ICD-9-CM: 426.4 Syncope, unspecified syncope type     ICD-10-CM: R55 
ICD-9-CM: 780.2 Stenosis of carotid artery, unspecified laterality     ICD-10-CM: I65.29 ICD-9-CM: 433.10 Alzheimer's dementia without behavioral disturbance, unspecified timing of dementia onset     ICD-10-CM: G30.9, F02.80 ICD-9-CM: 331.0, 294.10 Heart murmur     ICD-10-CM: R01.1 ICD-9-CM: 406. 2 Vitals BP Pulse Resp Height(growth percentile) Weight(growth percentile) SpO2 118/60 (BP 1 Location: Right arm, BP Patient Position: Sitting) 72 18 5' 10\" (1.778 m) 135 lb 11.2 oz (61.6 kg) 98% BMI Smoking Status 19.47 kg/m2 Never Smoker Vitals History BMI and BSA Data Body Mass Index Body Surface Area  
 19.47 kg/m 2 1.74 m 2 Preferred Pharmacy Pharmacy Name Phone Rajeev Sweeney, [facility] [phone] Your Updated Medication List  
  
   
This list is accurate as of: 9/18/17  3:53 PM.  Always use your most recent med list.  
  
  
  
  
 aspirin delayed-release 81 mg tablet Take 81 mg by mouth daily. fluticasone 50 mcg/actuation nasal spray Commonly known as:  Marcine Infield 2 Sprays by Both Nostrils route daily. We Performed the Following AMB POC EKG ROUTINE W/ 12 LEADS, INTER & REP [02219 CPT(R)] To-Do List   
 09/21/2017 ECHO:  2D ECHO COMPLETE ADULT (TTE) W OR WO CONTR Introducing [facility]! Carlos Manuel Ceballos introduces [employer] patient portal. Now you can access parts of your medical record, email your doctor's office, and request medication refills online. 1. In your internet browser, go to [email or web address] [email or web address] 2. Click on the First Time User? Click Here link in the Sign In box. You will see the New Member Sign Up page. 3. Enter your [employer] Access Code exactly as it appears below. You will not need to use this code after youve completed the sign-up process. If you do not sign up before the expiration date, you must request a new code. · [employer] Access Code: [email or web address] Expires: 11/28/2017  8:50 AM 
 
4. Enter the last four digits of your Social Security Number (xxxx) and Date of Birth (mm/dd/yyyy) as indicated and click Submit. You will be taken to the next sign-up page. 5. Create a [employer] ID. This will be your [employer] login ID and cannot be changed, so think of one that is secure and easy to remember. 6. Create a [employer] password. You can change your password at any time. 7. Enter your Password Reset Question and Answer. This can be used at a later time if you forget your password. 8. Enter your e-mail address. You will receive e-mail notification when new information is available in [address] 9. Click Sign Up. You can now view and download portions of your medical record. 10. Click the Download Summary menu link to download a portable copy of your medical information. If you have questions, please visit the Frequently Asked Questions section of the [employer] website. Remember, [employer] is NOT to be used for urgent needs. For medical emergencies, dial 911. Now available from your iPhone and Android! Please provide this summary of care documentation to your next provider. Your primary care clinician is listed as Inocente Solano. If you have any questions after today's visit, please call [phone].

## 2017-09-20 ENCOUNTER — CLINICAL SUPPORT (OUTPATIENT)
Dept: CARDIOLOGY CLINIC | Age: 82
End: 2017-09-20

## 2017-09-20 DIAGNOSIS — R01.1 HEART MURMUR: ICD-10-CM

## 2017-09-20 DIAGNOSIS — R55 SYNCOPE, UNSPECIFIED SYNCOPE TYPE: ICD-10-CM

## 2017-09-20 DIAGNOSIS — I45.10 RBBB: ICD-10-CM

## 2017-09-20 DIAGNOSIS — F02.80 ALZHEIMER'S DEMENTIA WITHOUT BEHAVIORAL DISTURBANCE, UNSPECIFIED TIMING OF DEMENTIA ONSET: ICD-10-CM

## 2017-09-20 DIAGNOSIS — G30.9 ALZHEIMER'S DEMENTIA WITHOUT BEHAVIORAL DISTURBANCE, UNSPECIFIED TIMING OF DEMENTIA ONSET: ICD-10-CM

## 2017-09-20 DIAGNOSIS — I65.29 STENOSIS OF CAROTID ARTERY, UNSPECIFIED LATERALITY: ICD-10-CM

## 2017-09-21 ENCOUNTER — DOCUMENTATION ONLY (OUTPATIENT)
Dept: INTERNAL MEDICINE CLINIC | Age: 82
End: 2017-09-21

## 2017-09-21 ENCOUNTER — TELEPHONE (OUTPATIENT)
Dept: CARDIOLOGY CLINIC | Age: 82
End: 2017-09-21

## 2017-09-21 NOTE — PROGRESS NOTES
Pt has seen Dr Vikas Quintero, said if echo of heart was normal he could be cleared for surgery. Echocardiography was WNL.     He is cleared for surgery

## 2017-09-21 NOTE — TELEPHONE ENCOUNTER
He can be cleared as low risk for major adverse cardiac complications for his procedure. His echo shows NL heart strength, healthy valve function.

## 2017-09-21 NOTE — PROGRESS NOTES
Per order of Dr Aruna Shin and Dr Ford  faxed completed pre op form and office clearance letter with EKG and clearance latter from Dr Dean Cox to [phone] [place] office - also faxed to surgery center at [address] [place] [address]  6/10/6313  0:62 PM

## 2017-09-21 NOTE — TELEPHONE ENCOUNTER
Pt sched for cataract removal on the right eye on 9-21-17 by Dr Concepcion Camacho office. The pt wife says they are wanting the clearance faxed today by 12noon today. Please fax the cc to Dr Aster avitia at [phone]. Please call pt when this has been completed.      Thanks

## 2017-09-22 ENCOUNTER — ANESTHESIA EVENT (OUTPATIENT)
Dept: SURGERY | Age: 82
End: 2017-09-22
Payer: MEDICARE

## 2017-09-22 ENCOUNTER — DOCUMENTATION ONLY (OUTPATIENT)
Dept: INTERNAL MEDICINE CLINIC | Age: 82
End: 2017-09-22

## 2017-09-22 ENCOUNTER — TELEPHONE (OUTPATIENT)
Dept: INTERNAL MEDICINE CLINIC | Age: 82
End: 2017-09-22

## 2017-09-22 NOTE — PERIOP NOTES
[facility]  Ambulatory Surgery Unit  Pre-operative Instructions    Surgery/Procedure Date  9/25/17            Tentative Arrival Time 3063      1. On the day of your surgery/procedure, please report to the Ambulatory Surgery Unit Registration Desk and sign in at your designated time. The Ambulatory Surgery Unit is located in [facility] on the [facility] side of the [facility] across from the [address] [place] [address]. Please have all of your health insurance cards and a photo ID. 2. You must have someone with you to drive you home, as you should not drive a car for 24 hours following anesthesia. Please make arrangements for a responsible adult friend or family member to stay with you for at least the first 24 hours after your surgery. 3. Do not have anything to eat or drink (including water, gum, mints, coffee, juice) after midnight   9/24/17. This may not apply to medications prescribed by your physician. (Please note below the special instructions with medications to take the morning of surgery, if applicable.)    4. We recommend you do not drink any alcoholic beverages for 24 hours before and after your surgery. 5. Contact your surgeons office for instructions on the following medications: non-steroidal anti-inflammatory drugs (i.e. Advil, Aleve), vitamins, and supplements. (Some surgeons will want you to stop these medications prior to surgery and others may allow you to take them)   **If you are currently taking Plavix, Coumadin, Aspirin and/or other blood-thinning agents, contact your surgeon for instructions. ** Your surgeon will partner with the physician prescribing these medications to determine if it is safe to stop or if you need to continue taking. Please do not stop taking these medications without instructions from your surgeon.     6. In an effort to help prevent surgical site infection, we ask that you shower with an anti-bacterial soap (i.e. Dial or Safeguard) on the morning of surgery. Do not apply any lotions, powders, or deodorants after the shower on the day of your procedure. If applicable, please do not shave the operative site for 48 hours prior to surgery. 7. Wear comfortable clothes. Wear glasses instead of contacts. Do not bring any jewelry or money (other than copays or fees as instructed). Do not wear make-up, particularly mascara, the morning of your surgery. Do not wear nail polish, particularly if you are having foot /hand surgery. Wear your hair loose or down, no ponytails, buns, lavon pins or clips. All body piercings must be removed. 8. You should understand that if you do not follow these instructions your surgery may be cancelled. If your physical condition changes (i.e. fever, cold or flu) please contact your surgeon as soon as possible. 9. It is important that you be on time. If a situation occurs where you may be late, or if you have any questions or problems, please call [phone].    10. Your surgery time may be subject to change. You will receive a phone call the day prior to surgery to confirm your arrival time. 11. Pediatric patients: please bring a change of clothes, diapers, bottle/sippy cup, pacifier, etc.      Special Instructions: Take all medications and inhalers, as prescribed, on the morning of surgery with a sip of water EXCEPT: none      I understand a pre-operative phone call will be made to verify my surgery time. In the event that I am not available, I give permission for a message to be left on my answering service and/or with another person?       Yes     (instructions given verbally during phone assessment- pt voiced understanding)     ___________________      ___________________      ________________  (Signature of Patient)          (Witness)                   (Date and Time)

## 2017-09-22 NOTE — PROGRESS NOTES
[facility] [place] [place]  [facility]  All documentation Massiel Moody filled out, pre op R/eye    Got confirmation

## 2017-09-25 ENCOUNTER — ANESTHESIA (OUTPATIENT)
Dept: SURGERY | Age: 82
End: 2017-09-25
Payer: MEDICARE

## 2017-09-25 ENCOUNTER — HOSPITAL ENCOUNTER (OUTPATIENT)
Age: 82
Setting detail: OUTPATIENT SURGERY
Discharge: HOME | End: 2017-09-25
Attending: OPHTHALMOLOGY | Admitting: OPHTHALMOLOGY
Payer: MEDICARE

## 2017-09-25 VITALS
HEART RATE: 61 BPM | BODY MASS INDEX: 19.59 KG/M2 | SYSTOLIC BLOOD PRESSURE: 170 MMHG | RESPIRATION RATE: 15 BRPM | HEIGHT: 69 IN | OXYGEN SATURATION: 100 % | WEIGHT: 132.25 LBS | DIASTOLIC BLOOD PRESSURE: 82 MMHG | TEMPERATURE: 97.9 F

## 2017-09-25 PROCEDURE — 77030020268 HC MISC GENERAL SUPPLY: Performed by: OPHTHALMOLOGY

## 2017-09-25 PROCEDURE — 74011250636 HC RX REV CODE- 250/636: Performed by: OPHTHALMOLOGY

## 2017-09-25 PROCEDURE — 76030000000 HC AMB SURG OR TIME 0.5 TO 1: Performed by: OPHTHALMOLOGY

## 2017-09-25 PROCEDURE — 76210000046 HC AMBSU PH II REC FIRST 0.5 HR: Performed by: OPHTHALMOLOGY

## 2017-09-25 PROCEDURE — 76060000061 HC AMB SURG ANES 0.5 TO 1 HR: Performed by: OPHTHALMOLOGY

## 2017-09-25 PROCEDURE — 74011000250 HC RX REV CODE- 250: Performed by: OPHTHALMOLOGY

## 2017-09-25 PROCEDURE — 74011250636 HC RX REV CODE- 250/636

## 2017-09-25 PROCEDURE — V2632 POST CHMBR INTRAOCULAR LENS: HCPCS | Performed by: OPHTHALMOLOGY

## 2017-09-25 PROCEDURE — 77030018846 HC SOL IRR STRL H20 ICUM -A: Performed by: OPHTHALMOLOGY

## 2017-09-25 DEVICE — LENS IOL POST 1-PC 6X13 20.0 -- ACRYSOF: Type: IMPLANTABLE DEVICE | Site: EYE | Status: FUNCTIONAL

## 2017-09-25 RX ORDER — DIPHENHYDRAMINE HYDROCHLORIDE 50 MG/ML
12.5 INJECTION, SOLUTION INTRAMUSCULAR; INTRAVENOUS AS NEEDED
Status: DISCONTINUED | OUTPATIENT
Start: 2017-09-25 | End: 2017-09-25 | Stop reason: HOSPADM

## 2017-09-25 RX ORDER — TETRACAINE HYDROCHLORIDE 5 MG/ML
SOLUTION OPHTHALMIC AS NEEDED
Status: DISCONTINUED | OUTPATIENT
Start: 2017-09-25 | End: 2017-09-25 | Stop reason: HOSPADM

## 2017-09-25 RX ORDER — TROPICAMIDE 10 MG/ML
1 SOLUTION/ DROPS OPHTHALMIC
Status: COMPLETED | OUTPATIENT
Start: 2017-09-25 | End: 2017-09-25

## 2017-09-25 RX ORDER — SODIUM CHLORIDE 9 MG/ML
25 INJECTION, SOLUTION INTRAVENOUS CONTINUOUS
Status: DISCONTINUED | OUTPATIENT
Start: 2017-09-25 | End: 2017-09-25 | Stop reason: HOSPADM

## 2017-09-25 RX ORDER — LIDOCAINE HYDROCHLORIDE 10 MG/ML
0.1 INJECTION, SOLUTION EPIDURAL; INFILTRATION; INTRACAUDAL; PERINEURAL AS NEEDED
Status: DISCONTINUED | OUTPATIENT
Start: 2017-09-25 | End: 2017-09-25 | Stop reason: HOSPADM

## 2017-09-25 RX ORDER — SODIUM CHLORIDE, SODIUM LACTATE, POTASSIUM CHLORIDE, CALCIUM CHLORIDE 600; 310; 30; 20 MG/100ML; MG/100ML; MG/100ML; MG/100ML
25 INJECTION, SOLUTION INTRAVENOUS CONTINUOUS
Status: DISCONTINUED | OUTPATIENT
Start: 2017-09-25 | End: 2017-09-25 | Stop reason: HOSPADM

## 2017-09-25 RX ORDER — OFLOXACIN 3 MG/ML
1 SOLUTION/ DROPS OPHTHALMIC
Status: COMPLETED | OUTPATIENT
Start: 2017-09-25 | End: 2017-09-25

## 2017-09-25 RX ORDER — TIMOLOL MALEATE 5 MG/ML
SOLUTION/ DROPS OPHTHALMIC AS NEEDED
Status: DISCONTINUED | OUTPATIENT
Start: 2017-09-25 | End: 2017-09-25 | Stop reason: HOSPADM

## 2017-09-25 RX ORDER — SODIUM CHLORIDE 0.9 % (FLUSH) 0.9 %
5-10 SYRINGE (ML) INJECTION EVERY 8 HOURS
Status: DISCONTINUED | OUTPATIENT
Start: 2017-09-25 | End: 2017-09-25 | Stop reason: HOSPADM

## 2017-09-25 RX ORDER — SODIUM CHLORIDE 0.9 % (FLUSH) 0.9 %
5-10 SYRINGE (ML) INJECTION AS NEEDED
Status: DISCONTINUED | OUTPATIENT
Start: 2017-09-25 | End: 2017-09-25 | Stop reason: HOSPADM

## 2017-09-25 RX ORDER — FENTANYL CITRATE 50 UG/ML
25 INJECTION, SOLUTION INTRAMUSCULAR; INTRAVENOUS
Status: DISCONTINUED | OUTPATIENT
Start: 2017-09-25 | End: 2017-09-25 | Stop reason: HOSPADM

## 2017-09-25 RX ORDER — ONDANSETRON 2 MG/ML
4 INJECTION INTRAMUSCULAR; INTRAVENOUS AS NEEDED
Status: DISCONTINUED | OUTPATIENT
Start: 2017-09-25 | End: 2017-09-25 | Stop reason: HOSPADM

## 2017-09-25 RX ORDER — NEOMYCIN SULFATE, POLYMYXIN B SULFATE, AND DEXAMETHASONE 3.5; 10000; 1 MG/G; [USP'U]/G; MG/G
OINTMENT OPHTHALMIC AS NEEDED
Status: DISCONTINUED | OUTPATIENT
Start: 2017-09-25 | End: 2017-09-25 | Stop reason: HOSPADM

## 2017-09-25 RX ADMIN — OFLOXACIN 1 DROP: 3 SOLUTION OPHTHALMIC at 12:01

## 2017-09-25 RX ADMIN — OFLOXACIN 1 DROP: 3 SOLUTION OPHTHALMIC at 12:16

## 2017-09-25 RX ADMIN — TROPICAMIDE 1 DROP: 10 SOLUTION/ DROPS OPHTHALMIC at 12:08

## 2017-09-25 RX ADMIN — TROPICAMIDE 1 DROP: 10 SOLUTION/ DROPS OPHTHALMIC at 12:00

## 2017-09-25 RX ADMIN — SODIUM CHLORIDE 25 ML/HR: 900 INJECTION, SOLUTION INTRAVENOUS at 12:07

## 2017-09-25 RX ADMIN — TROPICAMIDE 1 DROP: 10 SOLUTION/ DROPS OPHTHALMIC at 12:15

## 2017-09-25 RX ADMIN — OFLOXACIN 1 DROP: 3 SOLUTION OPHTHALMIC at 12:08

## 2017-09-25 NOTE — PERIOP NOTES
Aleta Apley  1934  [id number]    Situation:  Verbal report given from: MICHELLE Son CRNA, RN  Procedure: Procedure(s):  CATARACT EXTRACTION WITH INTRA OCULAR LENS RIGHT EYE    Background:    Preoperative diagnosis: H25.11    Postoperative diagnosis: H25.11    :  Dr. Ita Hugo    Assistant(s): Circ-1: Franchesca Vicente RN  Circ-2: Joss Thomas RN  Scrub Tech-1: Aden Angel    Specimens: * No specimens in log *    Assessment:  Intra-procedure medications         Anesthesia gave intra-procedure sedation and medications, see anesthesia flow sheet     Intravenous fluids: LR@ KVO     Vital signs stable       Recommendation:    Permission to share finding with family or friend yes

## 2017-09-25 NOTE — ANESTHESIA POSTPROCEDURE EVALUATION
Post-Anesthesia Evaluation and Assessment    Patient: Humberto Elam MRN: [id number]  SSN: [id number]    YOB: 1934  Age: 80 y.o. Sex: male       Cardiovascular Function/Vital Signs  Visit Vitals    /82    Pulse 61    Temp 36.6 °C (97.9 °F)    Resp 15    Ht 5' 9\" (1.753 m)    Wt 60 kg (132 lb 4 oz)    SpO2 100%    BMI 19.53 kg/m2       Patient is status post MAC anesthesia for Procedure(s):  CATARACT EXTRACTION WITH INTRA OCULAR LENS RIGHT EYE. Nausea/Vomiting: None    Postoperative hydration reviewed and adequate. Pain:  Pain Scale 1: Numeric (0 - 10) (09/25/17 1302)  Pain Intensity 1: 0 (09/25/17 1302)   Managed    Neurological Status:   Neuro (WDL): Within Defined Limits (09/25/17 1302)   At baseline    Mental Status and Level of Consciousness: Arousable    Pulmonary Status:   O2 Device: Room air (09/25/17 1304)   Adequate oxygenation and airway patent    Complications related to anesthesia: None    Post-anesthesia assessment completed.  No concerns    Signed By: Jani Diaz MD     September 25, 2017

## 2017-09-25 NOTE — IP AVS SNAPSHOT
[address] [facility] [phone]. [phone] Patient: Stuart Fernandez MRN: [id number] NFZ:[birth year] You are allergic to the following Allergen Reactions Pcn (Penicillins) Hives Pt says not allergic Beef Containing Products Rash Recent Documentation Height Weight BMI Smoking Status 1.753 m 60 kg 19.53 kg/m2 Never Smoker Emergency Contacts Name Discharge Info Relation Home Work Mobile Asha Kasper DISCHARGE CAREGIVER [3] Spouse [3] [phone] About your hospitalization You were admitted on:  September 25, 2017 You last received care in the:  [facility] AMB SURGERY UNIT You were discharged on:  September 25, 2017 Unit phone number:  [phone] Why you were hospitalized Your primary diagnosis was:  Not on File Providers Seen During Your Hospitalizations Provider Role Specialty Primary office phone Damaris Rodriguez MD Attending Provider Ophthalmology [phone] Your Primary Care Physician (PCP) Primary Care Physician Office Phone [facility]ax [facility] [phone] Follow-up Information Follow up With Details Comments Contact Info Breanna Gomes MD   [address] [place] [address] 
[phone] Your Appointments Monday October 02, 2017 CATARACT EXTRACTION WITH INTRA OCULAR LENS IMPLANT with Damaris Rodriguez MD  
[facility] AMB SURGERY UNIT (RI OR PRE ASSESSMENT) [address] [facility] [phone]. [phone] Current Discharge Medication List  
  
ASK your doctor about these medications Dose & Instructions Dispensing Information Comments Morning Noon Evening Bedtime  
 aspirin delayed-release 81 mg tablet Your last dose was: Your next dose is:    
   
   
 Dose:  81 mg Take 81 mg by mouth daily. Refills:  0 fluticasone 50 mcg/actuation nasal spray Commonly known as:  Crystal Nobles Your last dose was: Your next dose is:    
   
   
 Dose:  2 Spray 2 Sprays by Both Nostrils route daily. Quantity:  3 Bottle Refills:  3 Discharge Instructions MD COLLEEN Cob[place] [address] [place] [address] Phone: [phone]       Fax: [phone] If you are unable to keep appointment, kindly give 24 hours notice please. REMOVE PATCH 
START DROPS WHEN YOU GET HOME 
PUT PATCH BACK ON AT BEDTIME 1. DO NOT RUB the eye that was operated on. 2. Do not strain excessively. It is all right to bend as long as you do not strain. 3. It is safe to take a shower, wash your face, and wash your hair. Just keep the eye closed. 4. Do not swim for 1 week after surgery. 5. If you have any problems or questions, do not hesitate to call. There is always a physician on call at [phone] ext. [phone].  
6. Follow instructions on eye drops from office. 7. You may take Tylenol or Advil for discomfort. If it pressure not relieved by Tylenol or Advil, please call Dr. Diana Pulido office. If you were given prescriptions, please review the written information on the prescribed medications. DO NOT DRIVE WHILE TAKING NARCOTIC PAIN MEDICATIONS. DISCHARGE SUMMARY from Nurse The following personal items collected during your admission are returned to you:  
Dental Appliance: Dental Appliances: None Vision: Visual Aid: None Hearing Aid:   
Jewelry: Jewelry: None Clothing: Clothing: None Other Valuables: Other Valuables: None Valuables sent to safe:   
 
PATIENT INSTRUCTIONS: 
 
After general anesthesia or intravenous sedation, for 24 hours or while taking prescription Narcotics: · Someone should be with you for the next 24 hours. · For your own safety, a responsible adult must drive you home. · Limit your activities · Recommended activity: Rest today, Do not climb stairs or shower unattended for the next 24 hours. · Do not drive and operate hazardous machinery · Do not make important personal or business decisions · Do  not drink alcoholic beverages · If you have not urinated within 8 hours after discharge, please contact your surgeon on call. Report the following to your surgeon: 
· Excessive pain, swelling, redness or odor of or around the surgical area · Temperature over 100.5 · Nausea and vomiting lasting longer than 4 hours or if unable to take medications · Any signs of decreased circulation or nerve impairment to extremity: change in color, persistent  numbness, tingling, coldness or increase pain ·  
·  
· You will receive a Post Operative Call from one of the Recovery Room Nurses on the day after your surgery to check on you. It is very important for us to know how you are recovering after your surgery. · You may receive an e-mail or letter in the mail from [facility] regarding your experience with us in the Ambulatory Surgery Unit. Your feedback is valuable to us and we appreciate your participation in the survey. · If the above instructions are not adequate, please contact Henrry Vanegas RN, [facility] anesthesia Nurse Manager or our Anesthesiologist, at [phone]. ·  
· We wish youre a speedy recovery ? What to do at Home: *  Please give a list of your current medications to your Primary Care Provider. *  Please update this list whenever your medications are discontinued, doses are 
    changed, or new medications (including over-the-counter products) are added. *  Please carry medication information at all times in case of emergency situations. These are general instructions for a healthy lifestyle: No smoking/ No tobacco products/ Avoid exposure to second hand smoke Surgeon General's Warning:  Quitting smoking now greatly reduces serious risk to your health. Obesity, smoking, and sedentary lifestyle greatly increases your risk for illness A healthy diet, regular physical exercise & weight monitoring are important for maintaining a healthy lifestyle You may be retaining fluid if you have a history of heart failure or if you experience any of the following symptoms:  Weight gain of 3 pounds or more overnight or 5 pounds in a week, increased swelling in our hands or feet or shortness of breath while lying flat in bed. Please call your doctor as soon as you notice any of these symptoms; do not wait until your next office visit. Recognize signs and symptoms of STROKE: 
 
B - Balance E - Eyes F-face looks uneven A-arms unable to move or move even S-speech slurred or non-existent T-time-call 911 as soon as signs and symptoms begin-DO NOT go Back to bed or wait to see if you get better-TIME IS BRAIN. If you have not received your influenza and/or pneumococcal vaccine, please follow up with your primary care physician. The discharge information has been reviewed with the patient and caregiver. The patient and caregiver verbalized understanding. Discharge Orders None ACO Transitions of Care Introducing [employer] [address] Priyanka Sweeney offers a voluntary care coordination program to provide high quality service and care to [facility] fee-for-service beneficiaries. Janny Ramirez was designed to help you enhance your health and well-being through the following services: ? Transitions of Care  support for individuals who are transitioning from one care setting to another (example: Hospital to home). ? Chronic and Complex Care Coordination  support for individuals and caregivers of those with serious or chronic illnesses or with more than one chronic (ongoing) condition and those who take a number of different medications. If you meet specific medical criteria, a [facility] [address] may call you directly to coordinate your care with your primary care physician and your other care providers. For questions about the [facility] programs, please, contact your physicians office. For general questions or additional information about Accountable Care Organizations: 
Please visit www.medicare.gov/acos. html or call 1-800-MEDICARE (3-545.338.2086) TTY users should call 7-208.632.1480. Introducing hospitals & HEALTH SERVICES! [employer] introduces Twined patient portal. Now you can access parts of your medical record, email your doctor's office, and request medication refills online. 1. In your internet browser, go to [email or web address] 2. Click on the First Time User? Click Here link in the Sign In box. You will see the New Member Sign Up page. 3. Enter your Twined Access Code exactly as it appears below. You will not need to use this code after youve completed the sign-up process. If you do not sign up before the expiration date, you must request a new code. · Twined Access Code: [email or web address] Expires: 11/28/2017  8:50 AM 
 
4. Enter the last four digits of your Social Security Number (xxxx) and Date of Birth (mm/dd/yyyy) as indicated and click Submit. You will be taken to the next sign-up page. 5. Create a Twined ID. This will be your Twined login ID and cannot be changed, so think of one that is secure and easy to remember. 6. Create a Twined password. You can change your password at any time. 7. Enter your Password Reset Question and Answer. This can be used at a later time if you forget your password. 8. Enter your e-mail address. You will receive e-mail notification when new information is available in [address] 9. Click Sign Up. You can now view and download portions of your medical record.  
10. Click the Download Summary menu link to download a portable copy of your medical information. If you have questions, please visit the Frequently Asked Questions section of the [employer] website. Remember, MyChart is NOT to be used for urgent needs. For medical emergencies, dial 911. Now available from your iPhone and Android! General Information Please provide this summary of care documentation to your next provider. Patient Signature:  ____________________________________________________________ Date:  ____________________________________________________________  
  
Jessica Faes Provider Signature:  ____________________________________________________________ Date:  ____________________________________________________________

## 2017-09-25 NOTE — DISCHARGE INSTRUCTIONS
MD COLLEEN [place] [address]  [place] [address] [facility]  Phone: [phone]       Fax: [phone]  If you are unable to keep appointment, kindly give 24 hours notice please. REMOVE PATCH  START DROPS WHEN YOU GET HOME  PUT PATCH BACK ON AT BEDTIME    1. DO NOT RUB the eye that was operated on. 2. Do not strain excessively. It is all right to bend as long as you do not strain. 3. It is safe to take a shower, wash your face, and wash your hair. Just keep the eye closed. 4. Do not swim for 1 week after surgery. 5. If you have any problems or questions, do not hesitate to call. There is always a physician on call at [phone] ext. [phone].   6. Follow instructions on eye drops from office. 7. You may take Tylenol or Advil for discomfort. If it pressure not relieved by Tylenol or Advil, please call Dr. Helga Rosales office. If you were given prescriptions, please review the written information on the prescribed medications. DO NOT DRIVE WHILE TAKING NARCOTIC PAIN MEDICATIONS. DISCHARGE SUMMARY from Nurse    The following personal items collected during your admission are returned to you:   Dental Appliance: Dental Appliances: None  Vision: Visual Aid: None  Hearing Aid:    Jewelry: Jewelry: None  Clothing: Clothing: None  Other Valuables: Other Valuables: None  Valuables sent to safe:      PATIENT INSTRUCTIONS:    After general anesthesia or intravenous sedation, for 24 hours or while taking prescription Narcotics:  · Someone should be with you for the next 24 hours. · For your own safety, a responsible adult must drive you home. · Limit your activities  · Recommended activity: Rest today, Do not climb stairs or shower unattended for the next 24 hours.   · Do not drive and operate hazardous machinery  · Do not make important personal or business decisions  · Do  not drink alcoholic beverages  · If you have not urinated within 8 hours after discharge, please contact your surgeon on call. Report the following to your surgeon:  · Excessive pain, swelling, redness or odor of or around the surgical area  · Temperature over 100.5  · Nausea and vomiting lasting longer than 4 hours or if unable to take medications  · Any signs of decreased circulation or nerve impairment to extremity: change in color, persistent  numbness, tingling, coldness or increase pain  ·   ·   · You will receive a Post Operative Call from one of the Recovery Room Nurses on the day after your surgery to check on you. It is very important for us to know how you are recovering after your surgery. · You may receive an e-mail or letter in the mail from [place] regarding your experience with us in the Ambulatory Surgery Unit. Your feedback is valuable to us and we appreciate your participation in the survey. · If the above instructions are not adequate, please contact Olivier Arriola RN, [facility] anesthesia Nurse Manager or our Anesthesiologist, at [phone]. ·   · We wish youre a speedy recovery ? What to do at Home:      *  Please give a list of your current medications to your Primary Care Provider. *  Please update this list whenever your medications are discontinued, doses are      changed, or new medications (including over-the-counter products) are added. *  Please carry medication information at all times in case of emergency situations. These are general instructions for a healthy lifestyle:    No smoking/ No tobacco products/ Avoid exposure to second hand smoke    Surgeon General's Warning:  Quitting smoking now greatly reduces serious risk to your health.     Obesity, smoking, and sedentary lifestyle greatly increases your risk for illness    A healthy diet, regular physical exercise & weight monitoring are important for maintaining a healthy lifestyle    You may be retaining fluid if you have a history of heart failure or if you experience any of the following symptoms: Weight gain of 3 pounds or more overnight or 5 pounds in a week, increased swelling in our hands or feet or shortness of breath while lying flat in bed. Please call your doctor as soon as you notice any of these symptoms; do not wait until your next office visit. Recognize signs and symptoms of STROKE:    B - Balance  E - Eyes    F-face looks uneven    A-arms unable to move or move even    S-speech slurred or non-existent    T-time-call 911 as soon as signs and symptoms begin-DO NOT go       Back to bed or wait to see if you get better-TIME IS BRAIN. If you have not received your influenza and/or pneumococcal vaccine, please follow up with your primary care physician. The discharge information has been reviewed with the patient and caregiver. The patient and caregiver verbalized understanding.

## 2017-09-25 NOTE — OP NOTES
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 The patient was taken to the operating room and was given sedation. Tetracaine was given topically to the right eye, and the eye was prepped and draped in the usual manner for sterile eye surgery, including Betadine solution being dropped onto the conjunctiva at the beginning of the prep. The eyelashes were isolated with a plastic drape. A lid speculum was placed. Limbal relaxing incisions were made at the beginning of the case. A corneal marking gauge was used to make a 40 degree arc length at the 15 degree axis temporally and nasally. After the marking was made, as small amount of Viscoat (Duovisc) was placed on the incision sites, and a 600 micron depth aleisha knife was used to make the 40 degree arc nasally and temporally one half millimeters in from the limbus. A #15 blade was used to make a paracentesis at the 10:30 location. The eye was flushed with a lidocaine / epinephrine mixture (\"Shugarcaine\"). The eye was filled with Viscoat, and a crescent blade was used to make a 2.5 mm incision at the limbus temporally. This was dissected 2 mm into clear cornea, and the eye was entered with a 2.4 mm keratome. A 0.12 forceps was used for fixation during the procedure. A capsulorhexis flap was started with a cystotome, and this was completed 360 degrees with Utrata forceps. The capsular piece was removed. [place] dissection was performed with the \"Shugarcaine\" mixture on a cannula. The lens nucleus was removed using phacoemulsification with a total phaco time of 3:18.     The lens was cracked and manipulated with a Sinsky hook. Residual cortex was removed using irrigation / aspiration. The capsule remained intact. The capsule was refilled with Provisc, and an Wally Intraocular lens model SN60WF power 20.0 was placed in a lens folding cartridge with Provisc. The lens was unfolded into the capsular bag. The lens centered well. Residual Provisc and Viscoat removed using I / A. The Resure wound sealant was used to close the incision. The eye was flushed with BSS through the paracentesis. The eye was left soft and formed at the end of the case. Betadine solution was placed on the conjunctival surface at the end of the case. The incision site was water tight. The speculum was removed, and a drop of timolol 0.5% and anish/poly/dex ointment was placed on the eye followed by a shield. The patient tolerated the procedure well and is to follow-up in one day.

## 2017-09-25 NOTE — ANESTHESIA PREPROCEDURE EVALUATION
Anesthetic History   No history of anesthetic complications            Review of Systems / Medical History  Patient summary reviewed, nursing notes reviewed and pertinent labs reviewed    Pulmonary  Within defined limits                 Neuro/Psych         Dementia     Cardiovascular              PAD (h/o CEA) and hyperlipidemia    Exercise tolerance: >4 METS  Comments: EKG= RBBB  Cardiology has \"cleared\" pt for procedure.    GI/Hepatic/Renal  Within defined limits              Endo/Other  Within defined limits           Other Findings   Comments: BPH           Physical Exam    Airway  Mallampati: II  TM Distance: 4 - 6 cm  Neck ROM: decreased range of motion   Mouth opening: Normal     Cardiovascular    Rhythm: regular  Rate: normal      Pertinent negatives: No murmur   Dental    Dentition: Upper partial plate and Lower partial plate     Pulmonary  Breath sounds clear to auscultation               Abdominal  GI exam deferred       Other Findings            Anesthetic Plan    ASA: 3  Anesthesia type: MAC          Induction: Intravenous  Anesthetic plan and risks discussed with: Patient and Spouse

## 2017-09-25 NOTE — BRIEF OP NOTE
BRIEF OPERATIVE NOTE    Date of Procedure: 9/25/2017   Preoperative Diagnosis: NUCLEAR SCLEROTIC CATARACT RIGHT EYE  H25.11  Postoperative Diagnosis: NUCLEAR SCLEROTIC CATARACT RIGHT EYE  H25.11    Procedure(s):  CATARACT EXTRACTION WITH INTRA OCULAR LENS RIGHT EYE  Surgeon(s) and Role:     * Dora Leonard MD - Primary         Assistant Staff:       Surgical Staff:  Circ-1: Bebe Cancino RN  Circ-2: Marielena Cruz RN  Scrub Tech-1: Macie Francis  Event Time In   Incision Start 1237   Incision Close 1258     Anesthesia: MAC   Estimated Blood Loss: none  Specimens: * No specimens in log *   Findings: cataract right eye  Complications: none  Implants:   Implant Name Type Inv.  Item Serial No.  Lot No. LRB No. Used Action   LENS IOL POST 1-PC 6X13 20.0 -- ACRYSOF - [id number]   LENS IOL POST 1-PC 6X13 20.0 -- A[employer]YSOF [id number] [employer]   Right 1 Implanted

## 2017-09-29 ENCOUNTER — ANESTHESIA EVENT (OUTPATIENT)
Dept: SURGERY | Age: 82
End: 2017-09-29
Payer: MEDICARE

## 2017-09-29 NOTE — PERIOP NOTES
[facility]  Ambulatory Surgery Unit  Pre-operative Instructions    Surgery/Procedure Date  10/2/17            Tentative Arrival Time 1201      1. On the day of your surgery/procedure, please report to the Ambulatory Surgery Unit Registration Desk and sign in at your designated time. The Ambulatory Surgery Unit is located in [facility] on the [facility] side of the [facility] across from the [address] [place] [address]. Please have all of your health insurance cards and a photo ID. 2. You must have someone with you to drive you home, as you should not drive a car for 24 hours following anesthesia. Please make arrangements for a responsible adult friend or family member to stay with you for at least the first 24 hours after your surgery. 3. Do not have anything to eat or drink (including water, gum, mints, coffee, juice) after midnight   10/1/17. This may not apply to medications prescribed by your physician. (Please note below the special instructions with medications to take the morning of surgery, if applicable.)    4. We recommend you do not drink any alcoholic beverages for 24 hours before and after your surgery. 5. Contact your surgeons office for instructions on the following medications: non-steroidal anti-inflammatory drugs (i.e. Advil, Aleve), vitamins, and supplements. (Some surgeons will want you to stop these medications prior to surgery and others may allow you to take them)   **If you are currently taking Plavix, Coumadin, Aspirin and/or other blood-thinning agents, contact your surgeon for instructions. ** Your surgeon will partner with the physician prescribing these medications to determine if it is safe to stop or if you need to continue taking. Please do not stop taking these medications without instructions from your surgeon.     6. In an effort to help prevent surgical site infection, we ask that you shower with an anti-bacterial soap (i.e. Dial or Safeguard) on the morning of surgery. Do not apply any lotions, powders, or deodorants after the shower on the day of your procedure. If applicable, please do not shave the operative site for 48 hours prior to surgery. 7. Wear comfortable clothes. Wear glasses instead of contacts. Do not bring any jewelry or money (other than copays or fees as instructed). Do not wear make-up, particularly mascara, the morning of your surgery. Do not wear nail polish, particularly if you are having foot /hand surgery. Wear your hair loose or down, no ponytails, buns, lavon pins or clips. All body piercings must be removed. 8. You should understand that if you do not follow these instructions your surgery may be cancelled. If your physical condition changes (i.e. fever, cold or flu) please contact your surgeon as soon as possible. 9. It is important that you be on time. If a situation occurs where you may be late, or if you have any questions or problems, please call [phone].    10. Your surgery time may be subject to change. You will receive a phone call the day prior to surgery to confirm your arrival time. 11. Pediatric patients: please bring a change of clothes, diapers, bottle/sippy cup, pacifier, etc.      Special Instructions: Take all medications and inhalers, as prescribed, on the morning of surgery with a sip of water EXCEPT: none      I understand a pre-operative phone call will be made to verify my surgery time. In the event that I am not available, I give permission for a message to be left on my answering service and/or with another person?       Yes     (instructions given verbally during phone assessment- pt voiced understanding)     ___________________      ___________________      ________________  (Signature of Patient)          (Witness)                   (Date and Time)

## 2017-10-02 ENCOUNTER — ANESTHESIA (OUTPATIENT)
Dept: SURGERY | Age: 82
End: 2017-10-02
Payer: MEDICARE

## 2017-10-02 ENCOUNTER — HOSPITAL ENCOUNTER (OUTPATIENT)
Age: 82
Setting detail: OUTPATIENT SURGERY
Discharge: HOME | End: 2017-10-02
Attending: OPHTHALMOLOGY | Admitting: OPHTHALMOLOGY
Payer: MEDICARE

## 2017-10-02 VITALS
SYSTOLIC BLOOD PRESSURE: 150 MMHG | WEIGHT: 131 LBS | DIASTOLIC BLOOD PRESSURE: 63 MMHG | RESPIRATION RATE: 19 BRPM | TEMPERATURE: 97.5 F | BODY MASS INDEX: 19.4 KG/M2 | HEART RATE: 60 BPM | HEIGHT: 69 IN | OXYGEN SATURATION: 100 %

## 2017-10-02 PROCEDURE — 76210000050 HC AMBSU PH II REC 0.5 TO 1 HR: Performed by: OPHTHALMOLOGY

## 2017-10-02 PROCEDURE — 77030020268 HC MISC GENERAL SUPPLY: Performed by: OPHTHALMOLOGY

## 2017-10-02 PROCEDURE — 76060000061 HC AMB SURG ANES 0.5 TO 1 HR: Performed by: OPHTHALMOLOGY

## 2017-10-02 PROCEDURE — 77030018846 HC SOL IRR STRL H20 ICUM -A: Performed by: OPHTHALMOLOGY

## 2017-10-02 PROCEDURE — 74011000250 HC RX REV CODE- 250: Performed by: OPHTHALMOLOGY

## 2017-10-02 PROCEDURE — 74011000250 HC RX REV CODE- 250

## 2017-10-02 PROCEDURE — V2632 POST CHMBR INTRAOCULAR LENS: HCPCS | Performed by: OPHTHALMOLOGY

## 2017-10-02 PROCEDURE — 74011250636 HC RX REV CODE- 250/636

## 2017-10-02 PROCEDURE — 74011250636 HC RX REV CODE- 250/636: Performed by: OPHTHALMOLOGY

## 2017-10-02 PROCEDURE — 76030000000 HC AMB SURG OR TIME 0.5 TO 1: Performed by: OPHTHALMOLOGY

## 2017-10-02 DEVICE — LENS IOL POST 1-PC 6X13 20.5 -- ACRYSOF: Type: IMPLANTABLE DEVICE | Site: EYE | Status: FUNCTIONAL

## 2017-10-02 RX ORDER — TETRACAINE HYDROCHLORIDE 5 MG/ML
1 SOLUTION OPHTHALMIC
Status: DISCONTINUED | OUTPATIENT
Start: 2017-10-02 | End: 2017-10-02 | Stop reason: HOSPADM

## 2017-10-02 RX ORDER — SODIUM CHLORIDE 0.9 % (FLUSH) 0.9 %
5-10 SYRINGE (ML) INJECTION AS NEEDED
Status: DISCONTINUED | OUTPATIENT
Start: 2017-10-02 | End: 2017-10-02 | Stop reason: HOSPADM

## 2017-10-02 RX ORDER — SODIUM CHLORIDE 9 MG/ML
25 INJECTION, SOLUTION INTRAVENOUS CONTINUOUS
Status: DISCONTINUED | OUTPATIENT
Start: 2017-10-02 | End: 2017-10-02 | Stop reason: HOSPADM

## 2017-10-02 RX ORDER — SODIUM CHLORIDE, SODIUM LACTATE, POTASSIUM CHLORIDE, CALCIUM CHLORIDE 600; 310; 30; 20 MG/100ML; MG/100ML; MG/100ML; MG/100ML
25 INJECTION, SOLUTION INTRAVENOUS CONTINUOUS
Status: DISCONTINUED | OUTPATIENT
Start: 2017-10-02 | End: 2017-10-02 | Stop reason: HOSPADM

## 2017-10-02 RX ORDER — PROPOFOL 10 MG/ML
INJECTION, EMULSION INTRAVENOUS AS NEEDED
Status: DISCONTINUED | OUTPATIENT
Start: 2017-10-02 | End: 2017-10-02 | Stop reason: HOSPADM

## 2017-10-02 RX ORDER — LIDOCAINE HYDROCHLORIDE 20 MG/ML
INJECTION, SOLUTION EPIDURAL; INFILTRATION; INTRACAUDAL; PERINEURAL AS NEEDED
Status: DISCONTINUED | OUTPATIENT
Start: 2017-10-02 | End: 2017-10-02 | Stop reason: HOSPADM

## 2017-10-02 RX ORDER — ONDANSETRON 2 MG/ML
4 INJECTION INTRAMUSCULAR; INTRAVENOUS AS NEEDED
Status: DISCONTINUED | OUTPATIENT
Start: 2017-10-02 | End: 2017-10-02 | Stop reason: HOSPADM

## 2017-10-02 RX ORDER — TIMOLOL MALEATE 5 MG/ML
1 SOLUTION/ DROPS OPHTHALMIC 2 TIMES DAILY
Status: DISCONTINUED | OUTPATIENT
Start: 2017-10-02 | End: 2017-10-02 | Stop reason: HOSPADM

## 2017-10-02 RX ORDER — TROPICAMIDE 10 MG/ML
1 SOLUTION/ DROPS OPHTHALMIC
Status: COMPLETED | OUTPATIENT
Start: 2017-10-02 | End: 2017-10-02

## 2017-10-02 RX ORDER — SODIUM CHLORIDE 0.9 % (FLUSH) 0.9 %
5-10 SYRINGE (ML) INJECTION EVERY 8 HOURS
Status: DISCONTINUED | OUTPATIENT
Start: 2017-10-02 | End: 2017-10-02 | Stop reason: HOSPADM

## 2017-10-02 RX ORDER — LIDOCAINE HYDROCHLORIDE 10 MG/ML
0.1 INJECTION, SOLUTION EPIDURAL; INFILTRATION; INTRACAUDAL; PERINEURAL AS NEEDED
Status: DISCONTINUED | OUTPATIENT
Start: 2017-10-02 | End: 2017-10-02 | Stop reason: HOSPADM

## 2017-10-02 RX ORDER — MIDAZOLAM HYDROCHLORIDE 1 MG/ML
INJECTION, SOLUTION INTRAMUSCULAR; INTRAVENOUS AS NEEDED
Status: DISCONTINUED | OUTPATIENT
Start: 2017-10-02 | End: 2017-10-02 | Stop reason: HOSPADM

## 2017-10-02 RX ORDER — DIPHENHYDRAMINE HYDROCHLORIDE 50 MG/ML
12.5 INJECTION, SOLUTION INTRAMUSCULAR; INTRAVENOUS AS NEEDED
Status: DISCONTINUED | OUTPATIENT
Start: 2017-10-02 | End: 2017-10-02 | Stop reason: HOSPADM

## 2017-10-02 RX ORDER — OFLOXACIN 3 MG/ML
1 SOLUTION/ DROPS OPHTHALMIC
Status: COMPLETED | OUTPATIENT
Start: 2017-10-02 | End: 2017-10-02

## 2017-10-02 RX ORDER — NEOMYCIN SULFATE, POLYMYXIN B SULFATE, AND DEXAMETHASONE 3.5; 10000; 1 MG/G; [USP'U]/G; MG/G
OINTMENT OPHTHALMIC ONCE
Status: COMPLETED | OUTPATIENT
Start: 2017-10-02 | End: 2017-10-02

## 2017-10-02 RX ORDER — FENTANYL CITRATE 50 UG/ML
25 INJECTION, SOLUTION INTRAMUSCULAR; INTRAVENOUS
Status: DISCONTINUED | OUTPATIENT
Start: 2017-10-02 | End: 2017-10-02 | Stop reason: HOSPADM

## 2017-10-02 RX ADMIN — OFLOXACIN 1 DROP: 3 SOLUTION OPHTHALMIC at 11:11

## 2017-10-02 RX ADMIN — TROPICAMIDE 1 DROP: 10 SOLUTION/ DROPS OPHTHALMIC at 11:06

## 2017-10-02 RX ADMIN — OFLOXACIN 1 DROP: 3 SOLUTION OPHTHALMIC at 11:00

## 2017-10-02 RX ADMIN — LIDOCAINE HYDROCHLORIDE 60 MG: 20 INJECTION, SOLUTION EPIDURAL; INFILTRATION; INTRACAUDAL; PERINEURAL at 11:33

## 2017-10-02 RX ADMIN — PROPOFOL 10 MG: 10 INJECTION, EMULSION INTRAVENOUS at 11:53

## 2017-10-02 RX ADMIN — MIDAZOLAM HYDROCHLORIDE 1 MG: 1 INJECTION, SOLUTION INTRAMUSCULAR; INTRAVENOUS at 11:19

## 2017-10-02 RX ADMIN — PROPOFOL 10 MG: 10 INJECTION, EMULSION INTRAVENOUS at 11:46

## 2017-10-02 RX ADMIN — PROPOFOL 10 MG: 10 INJECTION, EMULSION INTRAVENOUS at 12:01

## 2017-10-02 RX ADMIN — SODIUM CHLORIDE 25 ML/HR: 900 INJECTION, SOLUTION INTRAVENOUS at 10:57

## 2017-10-02 RX ADMIN — TROPICAMIDE 1 DROP: 10 SOLUTION/ DROPS OPHTHALMIC at 11:00

## 2017-10-02 RX ADMIN — OFLOXACIN 1 DROP: 3 SOLUTION OPHTHALMIC at 11:06

## 2017-10-02 RX ADMIN — MIDAZOLAM HYDROCHLORIDE 1 MG: 1 INJECTION, SOLUTION INTRAMUSCULAR; INTRAVENOUS at 11:33

## 2017-10-02 RX ADMIN — TROPICAMIDE 1 DROP: 10 SOLUTION/ DROPS OPHTHALMIC at 11:11

## 2017-10-02 NOTE — IP AVS SNAPSHOT
[address] [facility] 
[phone] Patient: Carlitos Berman MRN: [id number] EDU:8[birth year]24[birth year] You are allergic to the following Allergen Reactions Pcn (Penicillins) Hives Pt says not allergic Beef Containing Products Rash Recent Documentation Height Weight BMI Smoking Status 1.753 m 59.4 kg 19.35 kg/m2 Never Smoker Emergency Contacts Name Discharge Info Relation Home Work Mobile Asha Kasper DISCHARGE CAREGIVER [3] Spouse [3] [phone] About your hospitalization You were admitted on:  October 2, 2017 You last received care in the:  [facility] AMB SURGERY UNIT You were discharged on:  October 2, 2017 Unit phone number:  [phone] Why you were hospitalized Your primary diagnosis was:  Not on File Providers Seen During Your Hospitalizations Provider Role Specialty Primary office phone Didier Jerez MD Attending Provider Ophthalmology [phone] Your Primary Care Physician (PCP) Primary Care Physician Office Phone Office Fax Elpidio Prieto [phone] Follow-up Information None Your Appointments Wednesday November 15, 2017  3:30 PM EST PHYSICAL PRE OP with MD Chris Moyer ([facility]) Sonja Hager [address] 12[address] [facility]  
[phone] Current Discharge Medication List  
  
ASK your doctor about these medications Dose & Instructions Dispensing Information Comments Morning Noon Evening Bedtime  
 aspirin delayed-release 81 mg tablet Your last dose was: Your next dose is:    
   
   
 Dose:  81 mg Take 81 mg by mouth daily. Refills:  0  
     
   
   
   
  
 fluticasone 50 mcg/actuation nasal spray Commonly known as:  Giovanny Riser Your last dose was: Your next dose is:    
   
   
 Dose:  2 Spray 2 Sprays by Both Nostrils route daily. Quantity:  3 Bottle Refills:  3 Discharge Instructions MD COLLEEN Reina [facility] [place] [address] [facility] Phone: [phone]       Fax: [phone] If you are unable to keep appointment, kindly give 24 hours notice please. REMOVE PATCH 
START DROPS WHEN YOU GET HOME 
PUT PATCH BACK ON AT BEDTIME 1. DO NOT RUB the eye that was operated on. 2. Do not strain excessively. It is all right to bend as long as you do not strain. 3. It is safe to take a shower, wash your face, and wash your hair. Just keep the eye closed. 4. Do not swim for 1 week after surgery. 5. If you have any problems or questions, do not hesitate to call. There is always a physician on call at [phone] ext. [phone].  
6. Follow instructions on eye drops from office. 7. You may take Tylenol or Advil for discomfort. If it pressure not relieved by Tylenol or Advil, please call Dr. Marbin Dyson office. If you were given prescriptions, please review the written information on the prescribed medications. DO NOT DRIVE WHILE TAKING NARCOTIC PAIN MEDICATIONS. DISCHARGE SUMMARY from Nurse The following personal items collected during your admission are returned to you:  
Dental Appliance: Dental Appliances: Partials Vision: Visual Aid: None Hearing Aid:   
Jewelry: Jewelry: None Clothing: Clothing: With patient Other Valuables: Other Valuables: None Valuables sent to safe:   
 
PATIENT INSTRUCTIONS: 
 
After general anesthesia or intravenous sedation, for 24 hours or while taking prescription Narcotics: · Someone should be with you for the next 24 hours. · For your own safety, a responsible adult must drive you home. · Limit your activities · Recommended activity: Rest today, Do not climb stairs or shower unattended for the next 24 hours. · Do not drive and operate hazardous machinery · Do not make important personal or business decisions · Do  not drink alcoholic beverages · If you have not urinated within 8 hours after discharge, please contact your surgeon on call. Report the following to your surgeon: 
· Excessive pain, swelling, redness or odor of or around the surgical area · Temperature over 100.5 · Nausea and vomiting lasting longer than 4 hours or if unable to take medications · Any signs of decreased circulation or nerve impairment to extremity: change in color, persistent  numbness, tingling, coldness or increase pain ·  
·  
· You will receive a Post Operative Call from one of the Recovery Room Nurses on the day after your surgery to check on you. It is very important for us to know how you are recovering after your surgery. · You may receive an e-mail or letter in the mail from [employer] regarding your experience with us in the Ambulatory Surgery Unit. Your feedback is valuable to us and we appreciate your participation in the survey. · If the above instructions are not adequate, please contact Sameer Lau RN, Kacey anesthesia Nurse Manager or our Anesthesiologist, at [phone]. ·  
· We wish youre a speedy recovery ? What to do at Home: *  Please give a list of your current medications to your Primary Care Provider. *  Please update this list whenever your medications are discontinued, doses are 
    changed, or new medications (including over-the-counter products) are added. *  Please carry medication information at all times in case of emergency situations. These are general instructions for a healthy lifestyle: No smoking/ No tobacco products/ Avoid exposure to second hand smoke Surgeon General's Warning:  Quitting smoking now greatly reduces serious risk to your health. Obesity, smoking, and sedentary lifestyle greatly increases your risk for illness A healthy diet, regular physical exercise & weight monitoring are important for maintaining a healthy lifestyle You may be retaining fluid if you have a history of heart failure or if you experience any of the following symptoms:  Weight gain of 3 pounds or more overnight or 5 pounds in a week, increased swelling in our hands or feet or shortness of breath while lying flat in bed. Please call your doctor as soon as you notice any of these symptoms; do not wait until your next office visit. Recognize signs and symptoms of STROKE: 
 
B - Balance E - Eyes F-face looks uneven A-arms unable to move or move even S-speech slurred or non-existent T-time-call 911 as soon as signs and symptoms begin-DO NOT go Back to bed or wait to see if you get better-TIME IS BRAIN. If you have not received your influenza and/or pneumococcal vaccine, please follow up with your primary care physician. The discharge information has been reviewed with the patient and caregiver. The patient and caregiver verbalized understanding. Discharge Orders None ACO Transitions of Care Introducing [employer] [address] Priyanka Sweeney offers a voluntary care coordination program to provide high quality service and care to [facility] fee-for-service beneficiaries. Josefina Camejo was designed to help you enhance your health and well-being through the following services: ? Transitions of Care  support for individuals who are transitioning from one care setting to another (example: Hospital to home). ? Chronic and Complex Care Coordination  support for individuals and caregivers of those with serious or chronic illnesses or with more than one chronic (ongoing) condition and those who take a number of different medications.   
 
 
If you meet specific medical criteria, a [facility] may call you directly to coordinate your care with your primary care physician and your other care providers. For questions about the [facility] programs, please, contact your physicians office. For general questions or additional information about Accountable Care Organizations: 
Please visit www.medicare.gov/acos. html or call 1-800-MEDICARE (5-472.276.2548) TTY users should call 3-887.428.1805. Introducing [facility] SERVICES! Laura Gorman introduces [employer] patient portal. Now you can access parts of your medical record, email your doctor's office, and request medication refills online. 1. In your internet browser, go to [email or web address] 2. Click on the First Time User? Click Here link in the Sign In box. You will see the New Member Sign Up page. 3. Enter your [employer] Access Code exactly as it appears below. You will not need to use this code after youve completed the sign-up process. If you do not sign up before the expiration date, you must request a new code. · [employer] Access Code: [email or web address] Expires: 11/28/2017  8:50 AM 
 
4. Enter the last four digits of your Social Security Number (xxxx) and Date of Birth (mm/dd/yyyy) as indicated and click Submit. You will be taken to the next sign-up page. 5. Create a [employer] ID. This will be your [employer] login ID and cannot be changed, so think of one that is secure and easy to remember. 6. Create a [employer] password. You can change your password at any time. 7. Enter your Password Reset Question and Answer. This can be used at a later time if you forget your password. 8. Enter your e-mail address. You will receive e-mail notification when new information is available in [address] 9. Click Sign Up. You can now view and download portions of your medical record. 10. Click the Download Summary menu link to download a portable copy of your medical information.  
 
If you have questions, please visit the Frequently Asked Questions section of the [facility]. Remember, MyChart is NOT to be used for urgent needs. For medical emergencies, dial 911. Now available from your iPhone and Android! General Information Please provide this summary of care documentation to your next provider. Patient Signature:  ____________________________________________________________ Date:  ____________________________________________________________  
  
Marvetta Land Provider Signature:  ____________________________________________________________ Date:  ____________________________________________________________

## 2017-10-02 NOTE — OP NOTES
Preoperative Diagnosis: Nuclear Sclerotic CATARACT LEFT EYE H25.12  Postoperative Diagnosis: Nuclear Sclerotic CATARACT LEFT EYE H25.12  Procedure: Extracapsular cataract extraction with lens implant left eye  Anesthesia: MAC with local  Estimated Blood Loss: None  Complications: None  Specimens: None  Assistants: None    The patient's left eye was dilated with mydriacyl 1% and ofloxacin 0.3% for 3 doses preoperatively. The patient was taken to the operating room and was given sedation. Tetracaine was given topically to the left eye, and the eye was prepped and draped in the usual manner for sterile eye surgery, including Betadine solution being dropped onto the conjunctiva and the beginning of the prep. The eyelashes were isolated with a plastic drape. A lid speculum was placed. A #15 blade was used to make a paracentesis at the 5:00 location. The eye was flushed with a lidocaine / epinephrine mixture (\"Shugarcaine\"). The eye was filled with Viscoat, and a crescent blade was used to make a 2.5 mm incision at the limbus temporally. This was dissected 2 mm into clear cornea, and the eye was entered with a 2.4 mm keratome. A 0.12 forceps was used for fixation during the procedure. A capsulorhexis flap was started with a cystotome, and this was completed 360 degrees with Utrata forceps. The capsular piece was removed. [place] dissection was performed with the \"Shugarcaine\" mixture on a cannula. The lens nucleus was removed using phacoemulsification with a total phaco time of 4:13 minutes. The lens was cracked and manipulated with a Sinsky hook. Residual cortex was removed using irrigation / aspiration. The capsule remained intact. The capsule was refilled with Provisc. An Wally Intraocular lens model SN60WF power 20.50 was placed in a lens folding cartridge with Provisc. The lens was unfolded into the capsular bag. The lens centered well. Residual Provisc and Viscoat were removed using I / A. The Resure wound sealant was used to close the incision. The eye was flushed with BSS through the paracentesis. Betadine solution was placed on the conjunctival surface at the end of the case. The eye was left soft and formed at the end of the case. The incision site was water tight. The speculum was removed, and a drop of timolol 0.5% and Maxitrol ointment was placed on the eye followed by a shield. The patient tolerated the procedure well and is to follow-up in one day. Note: It was initially planned to do limbal relaxing incisions at the beginning of the case. However, due to poor cooperation of the patient it was not possible to do this procedure.

## 2017-10-02 NOTE — BRIEF OP NOTE
BRIEF OPERATIVE NOTE    Date of Procedure: 10/2/2017   Preoperative Diagnosis: Nuclear Sclerotic CATARACT LEFT EYE H25.12  Postoperative Diagnosis: Nuclear Sclerotic CATARACT LEFT EYE H25.12  Procedure(s):  CATARACT EXTRACTION WITH INTRA OCULAR LENS LEFT EYE  Surgeon(s) and Role:     * Fawad Dai MD - Primary         Assistant Staff:       Surgical Staff:  Circ-1: Leigh Cuteo RN  Scrub Tech-1: Francisco Renner  Event Time In   Incision Start 1128   Incision Close 1201     Anesthesia: MAC   Estimated Blood Loss: none  Specimens: * No specimens in log *   Findings: cataract left eye  Complications: none  Implants:   Implant Name Type Inv.  Item Serial No.  Lot No. LRB No. Used Action   LENS IOL POST 1-PC 6X13 20.5 -- [employer] - [id number]   LENS IOL POST 1-PC 6X13 20.5 -- [employer] [id number] [employer] N/A Left 1 Implanted

## 2017-10-02 NOTE — PERIOP NOTES
Ashanti Bamberger  1934  [id number]    Situation:  Verbal report given from: mikayla ellsworth and lisa rn  Procedure: Procedure(s):  CATARACT EXTRACTION WITH INTRA OCULAR LENS LEFT EYE    Background:    Preoperative diagnosis: H25.12    Postoperative diagnosis: H25.12    :  Dr. Funes Level    Assistant(s): Circ-1: Gopi Stark RN  Scrub Tech-1: Raysa Ospina    Specimens: * No specimens in log *    Assessment:  Intra-procedure medications         Anesthesia gave intra-procedure sedation and medications, see anesthesia flow sheet     Intravenous fluids: LR@ KVO     Vital signs stable       Recommendation:    Permission to share finding with family or friend yes (pt has dementia)

## 2017-10-02 NOTE — ANESTHESIA POSTPROCEDURE EVALUATION
Post-Anesthesia Evaluation and Assessment    Patient: Tricia Mobley MRN: [id number]  SSN: [id number]    YOB: 1934  Age: 80 y.o. Sex: male       Cardiovascular Function/Vital Signs  Visit Vitals    /72    Pulse 60    Temp 36.4 °C (97.6 °F)    Resp 12    Ht 5' 9\" (1.753 m)    Wt 59.4 kg (131 lb)    SpO2 100%    BMI 19.35 kg/m2       Patient is status post MAC anesthesia for Procedure(s):  CATARACT EXTRACTION WITH INTRA OCULAR LENS LEFT EYE. Nausea/Vomiting: None    Postoperative hydration reviewed and adequate. Pain:  Pain Scale 1: Numeric (0 - 10) (10/02/17 1220)  Pain Intensity 1: 0 (10/02/17 1220)   Managed    Neurological Status:   Neuro (WDL): Within Defined Limits (10/02/17 1220)   At baseline    Mental Status and Level of Consciousness: Arousable    Pulmonary Status:   O2 Device: Room air (10/02/17 1220)   Adequate oxygenation and airway patent    Complications related to anesthesia: None    Post-anesthesia assessment completed.  No concerns    Signed By: Karolina Kunz MD     October 2, 2017

## 2017-10-02 NOTE — DISCHARGE INSTRUCTIONS
Alison Conner MD  [address], [address] [place] [address]  Phone: [phone]       Fax: [phone]  If you are unable to keep appointment, kindly give 24 hours notice please. REMOVE PATCH  START DROPS WHEN YOU GET HOME  PUT PATCH BACK ON AT BEDTIME    1. DO NOT RUB the eye that was operated on. 2. Do not strain excessively. It is all right to bend as long as you do not strain. 3. It is safe to take a shower, wash your face, and wash your hair. Just keep the eye closed. 4. Do not swim for 1 week after surgery. 5. If you have any problems or questions, do not hesitate to call. There is always a physician on call at [phone] ext. [phone].   6. Follow instructions on eye drops from office. 7. You may take Tylenol or Advil for discomfort. If it pressure not relieved by Tylenol or Advil, please call Dr. Derral Bence office. If you were given prescriptions, please review the written information on the prescribed medications. DO NOT DRIVE WHILE TAKING NARCOTIC PAIN MEDICATIONS. DISCHARGE SUMMARY from Nurse    The following personal items collected during your admission are returned to you:   Dental Appliance: Dental Appliances: Partials  Vision: Visual Aid: None  Hearing Aid:    Jewelry: Jewelry: None  Clothing: Clothing: With patient  Other Valuables: Other Valuables: None  Valuables sent to safe:      PATIENT INSTRUCTIONS:    After general anesthesia or intravenous sedation, for 24 hours or while taking prescription Narcotics:  · Someone should be with you for the next 24 hours. · For your own safety, a responsible adult must drive you home. · Limit your activities  · Recommended activity: Rest today, Do not climb stairs or shower unattended for the next 24 hours.   · Do not drive and operate hazardous machinery  · Do not make important personal or business decisions  · Do  not drink alcoholic beverages  · If you have not urinated within 8 hours after discharge, please contact your surgeon on call. Report the following to your surgeon:  · Excessive pain, swelling, redness or odor of or around the surgical area  · Temperature over 100.5  · Nausea and vomiting lasting longer than 4 hours or if unable to take medications  · Any signs of decreased circulation or nerve impairment to extremity: change in color, persistent  numbness, tingling, coldness or increase pain  ·   ·   · You will receive a Post Operative Call from one of the Recovery Room Nurses on the day after your surgery to check on you. It is very important for us to know how you are recovering after your surgery. · You may receive an e-mail or letter in the mail from [place] regarding your experience with us in the Ambulatory Surgery Unit. Your feedback is valuable to us and we appreciate your participation in the survey. · If the above instructions are not adequate, please contact Joey Massey RN, [facility] anesthesia Nurse Manager or our Anesthesiologist, at [phone]. ·   · We wish youre a speedy recovery ? What to do at Home:      *  Please give a list of your current medications to your Primary Care Provider. *  Please update this list whenever your medications are discontinued, doses are      changed, or new medications (including over-the-counter products) are added. *  Please carry medication information at all times in case of emergency situations. These are general instructions for a healthy lifestyle:    No smoking/ No tobacco products/ Avoid exposure to second hand smoke    Surgeon General's Warning:  Quitting smoking now greatly reduces serious risk to your health.     Obesity, smoking, and sedentary lifestyle greatly increases your risk for illness    A healthy diet, regular physical exercise & weight monitoring are important for maintaining a healthy lifestyle    You may be retaining fluid if you have a history of heart failure or if you experience any of the following symptoms:  Weight gain of 3 pounds or more overnight or 5 pounds in a week, increased swelling in our hands or feet or shortness of breath while lying flat in bed. Please call your doctor as soon as you notice any of these symptoms; do not wait until your next office visit. Recognize signs and symptoms of STROKE:    B - Balance  E - Eyes    F-face looks uneven    A-arms unable to move or move even    S-speech slurred or non-existent    T-time-call 911 as soon as signs and symptoms begin-DO NOT go       Back to bed or wait to see if you get better-TIME IS BRAIN. If you have not received your influenza and/or pneumococcal vaccine, please follow up with your primary care physician. The discharge information has been reviewed with the patient and caregiver. The patient and caregiver verbalized understanding.

## 2017-10-02 NOTE — ANESTHESIA PREPROCEDURE EVALUATION
Anesthetic History   No history of anesthetic complications            Review of Systems / Medical History  Patient summary reviewed, nursing notes reviewed and pertinent labs reviewed    Pulmonary  Within defined limits                 Neuro/Psych         Dementia     Cardiovascular              PAD (h/o CEA) and hyperlipidemia    Exercise tolerance: >4 METS  Comments: EKG= RBBB  EF 55%  Cardiology has \"cleared\" pt for procedure.    GI/Hepatic/Renal  Within defined limits              Endo/Other  Within defined limits           Other Findings   Comments: BPH           Physical Exam    Airway  Mallampati: II  TM Distance: 4 - 6 cm  Neck ROM: decreased range of motion   Mouth opening: Normal     Cardiovascular    Rhythm: regular  Rate: normal      Pertinent negatives: No murmur   Dental    Dentition: Upper partial plate and Lower partial plate     Pulmonary  Breath sounds clear to auscultation               Abdominal  GI exam deferred       Other Findings            Anesthetic Plan    ASA: 3  Anesthesia type: MAC          Induction: Intravenous  Anesthetic plan and risks discussed with: Patient and Spouse

## 2017-10-02 NOTE — PERIOP NOTES
1245 pt is awake and alert. Wife reports that he is back to baseline. Wife states that pt has short term memory issues. Pt taking po fluids without difficulty, denies complaints. Dr. Julissa Singh has spoken with pt and wife, and all discharge instructions have been reviewed. 1250 to wheelchair with minimal assist.  Discharged to car. Skin warm and dry, alert, denies complaints. Home with wife. Wife has wallet.

## 2017-11-13 NOTE — PROGRESS NOTES
Spoke to patient's wife, Arturo Gracia on HIPAA using 2 identifiers. Per Dr. Cooper Likes, she was made aware that echo is OK with thickening of the aortic valve with no significant stenosis. Pt's wife verbalized understanding.

## 2017-11-15 ENCOUNTER — OFFICE VISIT (OUTPATIENT)
Dept: INTERNAL MEDICINE CLINIC | Age: 82
End: 2017-11-15

## 2017-11-15 VITALS
HEIGHT: 69 IN | OXYGEN SATURATION: 98 % | WEIGHT: 135 LBS | TEMPERATURE: 96.9 F | RESPIRATION RATE: 16 BRPM | HEART RATE: 66 BPM | BODY MASS INDEX: 19.99 KG/M2 | SYSTOLIC BLOOD PRESSURE: 138 MMHG | DIASTOLIC BLOOD PRESSURE: 66 MMHG

## 2017-11-15 DIAGNOSIS — F02.80 ALZHEIMER'S DISEASE OF OTHER ONSET WITHOUT BEHAVIORAL DISTURBANCE: ICD-10-CM

## 2017-11-15 DIAGNOSIS — Z00.00 MEDICARE ANNUAL WELLNESS VISIT, SUBSEQUENT: Primary | ICD-10-CM

## 2017-11-15 DIAGNOSIS — G30.8 ALZHEIMER'S DISEASE OF OTHER ONSET WITHOUT BEHAVIORAL DISTURBANCE: ICD-10-CM

## 2017-11-15 DIAGNOSIS — Z23 ENCOUNTER FOR IMMUNIZATION: ICD-10-CM

## 2017-11-15 DIAGNOSIS — Z13.39 SCREENING FOR ALCOHOLISM: ICD-10-CM

## 2017-11-15 DIAGNOSIS — Z13.31 SCREENING FOR DEPRESSION: ICD-10-CM

## 2017-11-15 RX ORDER — MEMANTINE HYDROCHLORIDE 10 MG/1
5 TABLET ORAL DAILY
Qty: 60 TAB | Refills: 1 | Status: SHIPPED | OUTPATIENT
Start: 2017-11-15 | End: 2017-12-27 | Stop reason: SDUPTHER

## 2017-11-15 NOTE — MR AVS SNAPSHOT
Visit Information Date & Time Provider Department Dept. Phone Encounter #  
 11/15/2017  3:30 PM Xavi Elaine  [place]ende  [id number] Follow-up Instructions Return in about 6 weeks (around 12/27/2017) for routine follow up. Upcoming Health Maintenance Date Due  
 GLAUCOMA SCREENING Q2Y 4/26/1999 Pneumococcal 65+ Low/Medium Risk (2 of 2 - PCV13) 11/13/2015 Influenza Age 5 to Adult 8/1/2017 MEDICARE YEARLY EXAM 10/27/2017 DTaP/Tdap/Td series (2 - Td) 5/11/2025 Allergies as of 11/15/2017  Review Complete On: 11/15/2017 By: Xavi Elaine MD  
  
 Severity Noted Reaction Type Reactions Pcn [Penicillins] Medium 05/22/2012   Topical Hives Pt says not allergic Beef Containing Products  02/24/2016    Rash Current Immunizations  Reviewed on 10/26/2016 Name Date Influenza High Dose Vaccine PF  Incomplete, 10/26/2016 Influenza Vaccine 10/17/2013 Influenza Vaccine Paula Misha) 10/26/2015, 11/13/2014 Influenza Vaccine Split 12/6/2012, 11/16/2011, 10/12/2010 Pneumococcal Conjugate (PCV-13)  Incomplete Pneumococcal Polysaccharide (PPSV-23) 11/13/2014 Tdap 5/11/2015 Not reviewed this visit You Were Diagnosed With   
  
 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  
 138/66 (BP 1 Location: Left arm, BP Patient Position: Sitting) 66 96.9 °F (36.1 °C) (Oral) 16 5' 9\" (1.753 m) 135 lb (61.2 kg) SpO2 BMI Smoking Status 98% 19.94 kg/m2 Never Smoker BMI and BSA Data Body Mass Index Body Surface Area 19.94 kg/m 2 1.73 m 2 Preferred Pharmacy Pharmacy Name Phone [address] [facility] [phone] Your Updated Medication List  
  
   
This list is accurate as of: 11/15/17  4:09 PM.  Always use your most recent med list.  
  
  
  
  
 aspirin delayed-release 81 mg tablet Take 81 mg by mouth daily. fluticasone 50 mcg/actuation nasal spray Commonly known as:  Jeffry Merles 2 Sprays by Both Nostrils route daily. memantine 10 mg tablet Commonly known as:  Beatris Sheller Take 0.5 Tabs by mouth daily. For 6 days then 1 daily Prescriptions Printed Refills  
 memantine (NAMENDA) 10 mg tablet 1 Sig: Take 0.5 Tabs by mouth daily. For 6 days then 1 daily Class: Print Route: Oral  
  
We Performed the Following ADMIN INFLUENZA VIRUS VAC [ [facility]] [address] [[id number] HCPCS] INFLUENZA VIRUS VACCINE, HIGH DOSE SEASONAL, PRESERVATIVE FREE [63198 CPT(R)] PNEUMOCOCCAL CONJ VACCINE 13 VALENT IM [id number] CPT(R)] TN ANNUAL ALCOHOL SCREEN 15 MIN [id number] [facility]S] Follow-up Instructions Return in about 6 weeks (around 12/27/2017) for routine follow up. Patient Instructions Influenza (Flu) Vaccine (Inactivated or Recombinant): What You Need to Know Why get vaccinated? Influenza (\"flu\") is a contagious disease that spreads around the [place] every winter, usually between October and May. Flu is caused by influenza viruses and is spread mainly by coughing, sneezing, and close contact. Anyone can get flu. Flu strikes suddenly and can last several days. Symptoms vary by age, but can include: · Fever/chills. · Sore throat. · Muscle aches. · Fatigue. · Cough. · Headache. · Runny or stuffy nose.  
Flu can also lead to pneumonia and blood infections, and cause diarrhea and seizures in children. If you have a medical condition, such as heart or lung disease, flu can make it worse. Flu is more dangerous for some people. Infants and young children, people 72years of age and older, pregnant women, and people with certain health conditions or a weakened immune system are at greatest risk. Each year thousands of people in the [facility] die from flu, and many more are hospitalized. Flu vaccine can: · Keep you from getting flu. · Make flu less severe if you do get it. · Keep you from spreading flu to your family and other people. Inactivated and recombinant flu vaccines A dose of flu vaccine is recommended every flu season. Children 6 months through 6years of age may need two doses during the same flu season. Everyone else needs only one dose each flu season. Some inactivated flu vaccines contain a very small amount of a mercury-based preservative called thimerosal. Studies have not shown thimerosal in vaccines to be harmful, but flu vaccines that do not contain thimerosal are available. There is no live flu virus in flu shots. They cannot cause the flu. There are many flu viruses, and they are always changing. Each year a new flu vaccine is made to protect against three or four viruses that are likely to cause disease in the upcoming flu season. But even when the vaccine doesn't exactly match these viruses, it may still provide some protection. Flu vaccine cannot prevent: · Flu that is caused by a virus not covered by the vaccine. · Illnesses that look like flu but are not. Some people should not get this vaccine Tell the person who is giving you the vaccine: · If you have any severe (life-threatening) allergies. If you ever had a life-threatening allergic reaction after a dose of flu vaccine, or have a severe allergy to any part of this vaccine, you may be advised not to get vaccinated.  Most, but not all, types of flu vaccine contain a small amount of egg protein. · If you ever had Guillain-Barré syndrome (also called GBS) Some people with a history of GBS should not get this vaccine. This should be discussed with your doctor. · If you are not feeling well. It is usually okay to get flu vaccine when you have a mild illness, but you might be asked to come back when you feel better. Risks of a vaccine reaction With any medicine, including vaccines, there is a chance of reactions. These are usually mild and go away on their own, but serious reactions are also possible. Most people who get a flu shot do not have any problems with it. Minor problems following a flu shot include: · Soreness, redness, or swelling where the shot was given · Hoarseness · Sore, red or itchy eyes · Cough · Fever · Aches · Headache · Itching · Fatigue If these problems occur, they usually begin soon after the shot and last 1 or 2 days. More serious problems following a flu shot can include the following: · There may be a small increased risk of Guillain-Barré Syndrome (GBS) after inactivated flu vaccine. This risk has been estimated at 1 or 2 additional cases per million people vaccinated. This is much lower than the risk of severe complications from flu, which can be prevented by flu vaccine. · Malena Shoulders children who get the flu shot along with pneumococcal vaccine (PCV13) and/or DTaP vaccine at the same time might be slightly more likely to have a seizure caused by fever. Ask your doctor for more information. Tell your doctor if a child who is getting flu vaccine has ever had a seizure Problems that could happen after any injected vaccine: · People sometimes faint after a medical procedure, including vaccination. Sitting or lying down for about 15 minutes can help prevent fainting, and injuries caused by a fall. Tell your doctor if you feel dizzy, or have vision changes or ringing in the ears.  
· Some people get severe pain in the shoulder and have difficulty moving the arm where a shot was given. This happens very rarely. · Any medication can cause a severe allergic reaction. Such reactions from a vaccine are very rare, estimated at about 1 in a million doses, and would happen within a few minutes to a few hours after the vaccination. As with any medicine, there is a very remote chance of a vaccine causing a serious injury or death. The safety of vaccines is always being monitored. For more information, visit: www.cdc.gov/vaccinesafety/. What if there is a serious reaction? What should I look for? · Look for anything that concerns you, such as signs of a severe allergic reaction, very high fever, or unusual behavior. Signs of a severe allergic reaction can include hives, swelling of the face and throat, difficulty breathing, a fast heartbeat, dizziness, and weakness - usually within a few minutes to a few hours after the vaccination. What should I do? · If you think it is a severe allergic reaction or other emergency that can't wait, call 9-1-1 and get the person to the nearest hospital. Otherwise, call your doctor. · Reactions should be reported to the \"Vaccine Adverse Event Reporting System\" (VAERS). Your doctor should file this report, or you can do it yourself through the VAERS website at www.vaers. [facility].gov, or by calling 3-492.806.3034. [employer] does not give medical advice. The National Vaccine Injury Compensation Program 
The National Vaccine Injury Compensation Program (VICP) is a federal program that was created to compensate people who may have been injured by certain vaccines. Persons who believe they may have been injured by a vaccine can learn about the program and about filing a claim by calling 9-465.601.2822 or visiting the [employer] website at www.[facility].gov/vaccinecompensation. There is a time limit to file a claim for compensation. How can I learn more? · Ask your healthcare provider.  He or she can give you the vaccine package insert or suggest other sources of information. · Call your local or state health department. · Contact the Centers for Disease Control and Prevention (CDC): 
¨ Call 1-272.209.8168 (1-800-CDC-INFO) or ¨ Visit CDC's website at www.cdc.gov/flu Vaccine Information Statement Inactivated Influenza Vaccine 2015) 42 SIMONE Daigle [id number] [facility] and ralali Centers for Disease Control and Prevention Many Vaccine Information Statements are available in Icelandic and other languages. See www.immunize.org/vis. Muchas hojas de información sobre vacunas están disponibles en español y en otros idiomas. Visite www.immunize.org/vis. Care instructions adapted under license by [employer] (which disclaims liability or warranty for this information). If you have questions about a medical condition or this instruction, always ask your healthcare professional. [place] [address] any warranty or liability for your use of this information. Pneumococcal Conjugate Vaccine (PCV13): What You Need to Know Why get vaccinated? Vaccination can protect both children and adults from pneumococcal disease. Pneumococcal disease is caused by bacteria that can spread from person to person through close contact. It can cause ear infections, and it can also lead to more serious infections of the: 
· Lungs (pneumonia). · Blood (bacteremia). · Covering of the brain and spinal cord (meningitis). Pneumococcal pneumonia is most common among adults. Pneumococcal meningitis can cause deafness and brain damage, and it kills about 1 child in 10 who get it. Anyone can get pneumococcal disease, but children under 3years of age and adults 72 years and older, people with certain medical conditions, and cigarette smokers are at the highest risk. Before there was a vaccine, the [facility] saw the following in children under 5 each year from pneumococcal disease: · More than 700 cases of meningitis · About 13,000 blood infections · About 5 million ear infections · About 200 deaths Since the vaccine became available, severe pneumococcal disease in these children has fallen by 88%. About 18,000 older adults die of pneumococcal disease each year in the [place]. Treatment of pneumococcal infections with penicillin and other drugs is not as effective as it used to be, because some strains of the disease have become resistant to these drugs. This makes prevention of the disease through vaccination even more important. PCV13 vaccine Pneumococcal conjugate vaccine (called PCV13) protects against 13 types of pneumococcal bacteria. PCV13 is routinely given to children at 2, 4, 6, and 1515 months of age. It is also recommended for children and adults 3to 59years of age with certain health conditions, and for all adults 72years of age and older. Your doctor can give you details. Some people should not get this vaccine Anyone who has ever had a life-threatening allergic reaction to a dose of this vaccine, to an earlier pneumococcal vaccine called PCV7, or to any vaccine containing diphtheria toxoid (for example, DTaP), should not get PCV13. Anyone with a severe allergy to any component of PCV13 should not get the vaccine. Tell your doctor if the person being vaccinated has any severe allergies. If the person scheduled for vaccination is not feeling well, your healthcare provider might decide to reschedule the shot on another day. Risks of a vaccine reaction With any medicine, including vaccines, there is a chance of reactions. These are usually mild and go away on their own, but serious reactions are also possible. Problems reported following PCV13 varied by age and dose in the series. The most common problems reported among children were: · About half became drowsy after the shot, had a temporary loss of appetite, or had redness or tenderness where the shot was given. · About 1 out of 3 had swelling where the shot was given. · About 1 out of 3 had a mild fever, and about 1 in 20 had a fever over 102.2°F. 
· Up to about 8 out of 10 became fussy or irritable. Adults have reported pain, redness, and swelling where the shot was given; also mild fever, fatigue, headache, chills, or muscle pain. Eugenio Lira children who get PCV13 along with inactivated flu vaccine at the same time may be at increased risk for seizures caused by fever. Ask your doctor for more information. Problems that could happen after any vaccine: · People sometimes faint after a medical procedure, including vaccination. Sitting or lying down for about 15 minutes can help prevent fainting and the injuries caused by a fall. Tell your doctor if you feel dizzy or have vision changes or ringing in the ears. · Some older children and adults get severe pain in the shoulder and have difficulty moving the arm where a shot was given. This happens very rarely. · Any medication can cause a severe allergic reaction. Such reactions from a vaccine are very rare, estimated at about 1 in a million doses, and would happen within a few minutes to a few hours after the vaccination. As with any medicine, there is a very small chance of a vaccine causing a serious injury or death. The safety of vaccines is always being monitored. For more information, visit: www.cdc.gov/vaccinesafety. What if there is a serious reaction? What should I look for? · Look for anything that concerns you, such as signs of a severe allergic reaction, very high fever, or unusual behavior. Signs of a severe allergic reaction can include hives, swelling of the face and throat, difficulty breathing, a fast heartbeat, dizziness, and weakness, usually within a few minutes to a few hours after the vaccination. What should I do? · If you think it is a severe allergic reaction or other emergency that can't wait, call 911 or get the person to the nearest hospital. Otherwise, call your doctor. · Reactions should be reported to the Vaccine Adverse Event Reporting System (VAERS). Your doctor should file this report, or you can do it yourself through the VAERS website at www.vaers. [facility].gov, or by calling 0-137.803.8528. VAERS does not give medical advice. The National Vaccine Injury Compensation Program 
The National Vaccine Injury Compensation Program (VICP) is a federal program that was created to compensate people who may have been injured by certain vaccines. Persons who believe they may have been injured by a vaccine can learn about the program and about filing a claim by calling 3-551.860.6992 or visiting the [employer] website at www.VFA.gov/vaccinecompensation. There is a time limit to file a claim for compensation. How can I learn more? · Ask your healthcare provider. He or she can give you the vaccine package insert or suggest other sources of information. · Call your local or state health department. · Contact the Centers for Disease Control and Prevention (CDC): 
¨ Call 0-656.367.2991 (1-800-CDC-INFO) or ¨ Visit CDC's website at www.cdc.gov/vaccines Vaccine Information Statement PCV13 Vaccine 11/5/2015 
42 SIMONE Josefkaley Calabreselas [id number] Department of [facility] and [place] Centers for Disease Control and Prevention Many Vaccine Information Statements are available in Bengali and other languages. See www.immunize.org/vis. Muchas hojas de información sobre vacunas están disponibles en español y en otros idiomas. Visite www.immunize.org/vis. Care instructions adapted under license by [employer] (which disclaims liability or warranty for this information).  If you have questions about a medical condition or this instruction, always ask your healthcare professional. Juan Weston disclaims any warranty or liability for your use of this information. Medicare Wellness Visit, Male The best way to live healthy is to have a healthy lifestyle by eating a well-balanced diet, exercising regularly, limiting alcohol and stopping smoking. Regular physical exams and screening tests are another way to keep healthy. Preventive exams provided by your health care provider can find health problems before they become diseases or illnesses. Preventive services including immunizations, screening tests, monitoring and exams can help you take care of your own health. All people over age 72 should have a pneumovax  and and a prevnar shot to prevent pneumonia. These are once in a lifetime unless you and your provider decide differently. All people over 65 should have a yearly flu shot and a tetanus vaccine every 10 years. Screening for diabetes mellitus with a blood sugar test should be done every year. Glaucoma is a disease of the eye due to increased ocular pressure that can lead to blindness and it should be done every year by an eye professional. 
 
Cardiovascular screening tests that check for elevated lipids (fatty part of blood) which can lead to heart disease and strokes should be done every 5 years. Colorectal screening that evaluates for blood or polyps in your colon should be done yearly as a stool test or every five years as a flexible sigmoidoscope or every 10 years as a colonoscopy up to age 76. Men up to age 76 may need a screening blood test for prostate cancer at certain intervals, depending on their personal and family history. This decision is between the patient and his provider. If you have been a smoker or had family history of abdominal aortic aneurysms, you and your provider may decide to schedule an ultrasound test of your aorta. Hepatitis C screening is also recommended for anyone born between 80 through [address]. A shingles vaccine is also recommended once in a lifetime after age 61. Your Medicare Wellness Exam is recommended annually. Here is a list of your current Health Maintenance items with a due date: 
Health Maintenance Due Topic Date Due  Glaucoma Screening   04/26/1999  Pneumococcal Vaccine (2 of 2 - PCV13) 11/13/2015  Flu Vaccine  08/01/2017 [facility] Annual Well Visit  10/27/2017 Helping a Person With Alzheimer's Disease: Care Instructions Your Care Instructions Alzheimer's disease is a type of dementia. It affects memory, intelligence, judgment, language, and behavior. It is not clear what causes this disease. But it is the most common form of dementia in older adults. It may take many years to develop. Alzheimer's disease is different than mild memory loss that occurs with aging. Family members usually notice symptoms first. But the person also may realize that something is wrong. Follow-up care is a key part of your loved one's treatment and safety. Be sure to make and go to all appointments, and call your doctor if your loved one is having problems. It's also a good idea to know your loved one's test results and keep a list of the medicines he or she takes. How can you care for your loved one at home? · Develop a routine. The person will feel less frustrated or confused with a clear, simple daily plan. Remind him or her about important facts and events. · Be patient. It may take longer for the person to complete a task than it used to. · Help the person eat a balanced diet. Serve plenty of whole grains, fruits, and vegetables every day. If the person is not eating well at mealtimes, give snacks at midmorning and in the afternoon. Offer drinks such as Boost, Ensure, or Sustacal if he or she is losing weight. · Encourage exercise. Walking and other activity may slow the decline of mental ability. Help the person keep an active mind.  Encourage hobbies such as reading and crossword puzzles. · Take steps to help if the person is sundowning. This is the restless behavior and trouble with sleeping that may occur in late afternoon and at night. Try not to let the person nap during the day. Offer a glass of warm milk or caffeine-free tea before bedtime. · Ask family members and friends for help. You may need breaks where others can help care for the person. · Talk to the person's doctor about what resources are available for help in your area. · Review all of the person's medicines with his or her doctor. · For as long as the person is able, allow him or her to make decisions about activities, food, clothing, and other choices. Let the person be independent, even if tasks take more time or are not done perfectly. Tailor tasks to the person's abilities. For example, if cooking is no longer safe, ask for other help. He or she can help set the table or make simple dishes such as a salad. When the person needs help, offer it gently. Keeping safe · Make your home (or the person's home) safe. Tack down rugs, and put no-slip tape in the tub. Install handrails, and put safety switches on stoves and appliances. Keep rooms free of clutter. Make sure walkways around furniture are clear. Do not move furniture around, because the person may become confused. · Use locks on doors and cupboards. Lock up knives, scissors, medicines, cleaning supplies, and other dangerous things. · Do not let the person drive or cook if he or she cannot do it safely. A person with Alzheimer's should not drive unless he or she is able to pass an on-road driving test. Your state 's license bureau can do a driving test if there is any question. · Get medical alert jewelry for the person so you can be contacted if he or she wanders away. If possible, provide a safe place for wandering, such as an enclosed yard or garden. When should you call for help? Call 911 anytime you think you may need emergency care. For example, call if: 
? · A person who has Alzheimer's disease has disappeared. ? · A person who has Alzheimer's disease is seriously injured. ?Call your doctor now or seek immediate medical care if: 
? · The person you are caring for suddenly sees or hears things that are not there (hallucinates). ? · The person you are caring for has a sudden, drastic change in his or her behavior. ? Watch closely for changes in your loved one's health, and be sure to contact the doctor if: 
? · A person who has Alzheimer's disease gradually gets worse or has symptoms that could cause injury. ? · You need help caring for a person with Alzheimer's disease. ? · The person has problems with his or her medicine. Where can you learn more? Go to http://frandy-jose.info/. Enter P021 in the search box to learn more about \"Helping a Person With Alzheimer's Disease: Care Instructions. \" Current as of: May 12, 2017 Content Version: 11.4 © 5796-1599 [employer]. Care instructions adapted under license by [employer] (which disclaims liability or warranty for this information). If you have questions about a medical condition or this instruction, always ask your healthcare professional. [address] any warranty or liability for your use of this information. Introducing [facility] HE[facility]! Tiffanie Samuels introduces [facility] patient portal. Now you can access parts of your medical record, email your doctor's office, and request medication refills online. 1. In your internet browser, go to [email or web address] 2. Click on the First Time User? Click Here link in the Sign In box. You will see the New Member Sign Up page. 3. Enter your [facility] Access Code exactly as it appears below. You will not need to use this code after youve completed the sign-up process.  If you do not sign up before the expiration date, you must request a new code. · EDUS Access Code: [email or web address] Expires: 11/28/2017  7:50 AM 
 
4. Enter the last four digits of your Social Security Number (xxxx) and Date of Birth (mm/dd/yyyy) as indicated and click Submit. You will be taken to the next sign-up page. 5. Create a EDUS ID. This will be your EDUS login ID and cannot be changed, so think of one that is secure and easy to remember. 6. Create a EDUS password. You can change your password at any time. 7. Enter your Password Reset Question and Answer. This can be used at a later time if you forget your password. 8. Enter your e-mail address. You will receive e-mail notification when new information is available in [address] 9. Click Sign Up. You can now view and download portions of your medical record. 10. Click the Download Summary menu link to download a portable copy of your medical information. If you have questions, please visit the Frequently Asked Questions section of the EDUS website. Remember, [facility]US is NOT to be used for urgent needs. For medical emergencies, dial 911. Now available from your iPhone and Android! Please provide this summary of care documentation to your next provider. Your primary care clinician is listed as [facility]et. If you have any questions after today's visit, please call [phone].

## 2017-11-15 NOTE — PATIENT INSTRUCTIONS
Influenza (Flu) Vaccine (Inactivated or Recombinant): What You Need to Know  Why get vaccinated? Influenza (\"flu\") is a contagious disease that spreads around the [place] every winter, usually between October and May. Flu is caused by influenza viruses and is spread mainly by coughing, sneezing, and close contact. Anyone can get flu. Flu strikes suddenly and can last several days. Symptoms vary by age, but can include:  · Fever/chills. · Sore throat. · Muscle aches. · Fatigue. · Cough. · Headache. · Runny or stuffy nose. Flu can also lead to pneumonia and blood infections, and cause diarrhea and seizures in children. If you have a medical condition, such as heart or lung disease, flu can make it worse. Flu is more dangerous for some people. Infants and young children, people 72years of age and older, pregnant women, and people with certain health conditions or a weakened immune system are at greatest risk. Each year thousands of people in the [facility] die from flu, and many more are hospitalized. Flu vaccine can:  · Keep you from getting flu. · Make flu less severe if you do get it. · Keep you from spreading flu to your family and other people. Inactivated and recombinant flu vaccines  A dose of flu vaccine is recommended every flu season. Children 6 months through 6years of age may need two doses during the same flu season. Everyone else needs only one dose each flu season. Some inactivated flu vaccines contain a very small amount of a mercury-based preservative called thimerosal. Studies have not shown thimerosal in vaccines to be harmful, but flu vaccines that do not contain thimerosal are available. There is no live flu virus in flu shots. They cannot cause the flu. There are many flu viruses, and they are always changing. Each year a new flu vaccine is made to protect against three or four viruses that are likely to cause disease in the upcoming flu season.  But even when the vaccine doesn't exactly match these viruses, it may still provide some protection. Flu vaccine cannot prevent:  · Flu that is caused by a virus not covered by the vaccine. · Illnesses that look like flu but are not. Some people should not get this vaccine  Tell the person who is giving you the vaccine:  · If you have any severe (life-threatening) allergies. If you ever had a life-threatening allergic reaction after a dose of flu vaccine, or have a severe allergy to any part of this vaccine, you may be advised not to get vaccinated. Most, but not all, types of flu vaccine contain a small amount of egg protein. · If you ever had Guillain-Barré syndrome (also called GBS) Some people with a history of GBS should not get this vaccine. This should be discussed with your doctor. · If you are not feeling well. It is usually okay to get flu vaccine when you have a mild illness, but you might be asked to come back when you feel better. Risks of a vaccine reaction  With any medicine, including vaccines, there is a chance of reactions. These are usually mild and go away on their own, but serious reactions are also possible. Most people who get a flu shot do not have any problems with it. Minor problems following a flu shot include:  · Soreness, redness, or swelling where the shot was given  · Hoarseness  · Sore, red or itchy eyes  · Cough  · Fever  · Aches  · Headache  · Itching  · Fatigue  If these problems occur, they usually begin soon after the shot and last 1 or 2 days. More serious problems following a flu shot can include the following:  · There may be a small increased risk of Guillain-Barré Syndrome (GBS) after inactivated flu vaccine. This risk has been estimated at 1 or 2 additional cases per million people vaccinated. This is much lower than the risk of severe complications from flu, which can be prevented by flu vaccine.   · Moira Castellanoah children who get the flu shot along with pneumococcal vaccine (PCV13) and/or DTaP vaccine at the same time might be slightly more likely to have a seizure caused by fever. Ask your doctor for more information. Tell your doctor if a child who is getting flu vaccine has ever had a seizure  Problems that could happen after any injected vaccine:  · People sometimes faint after a medical procedure, including vaccination. Sitting or lying down for about 15 minutes can help prevent fainting, and injuries caused by a fall. Tell your doctor if you feel dizzy, or have vision changes or ringing in the ears. · Some people get severe pain in the shoulder and have difficulty moving the arm where a shot was given. This happens very rarely. · Any medication can cause a severe allergic reaction. Such reactions from a vaccine are very rare, estimated at about 1 in a million doses, and would happen within a few minutes to a few hours after the vaccination. As with any medicine, there is a very remote chance of a vaccine causing a serious injury or death. The safety of vaccines is always being monitored. For more information, visit: www.cdc.gov/vaccinesafety/. What if there is a serious reaction? What should I look for? · Look for anything that concerns you, such as signs of a severe allergic reaction, very high fever, or unusual behavior. Signs of a severe allergic reaction can include hives, swelling of the face and throat, difficulty breathing, a fast heartbeat, dizziness, and weakness - usually within a few minutes to a few hours after the vaccination. What should I do? · If you think it is a severe allergic reaction or other emergency that can't wait, call 9-1-1 and get the person to the nearest hospital. Otherwise, call your doctor. · Reactions should be reported to the \"Vaccine Adverse Event Reporting System\" (VAERS). Your doctor should file this report, or you can do it yourself through the VAERS website at www.vaers. [facility].gov, or by calling 6-788.169.8277.   iLink does not give medical advice. The National Vaccine Injury Compensation Program  The National Vaccine Injury Compensation Program (VICP) is a federal program that was created to compensate people who may have been injured by certain vaccines. Persons who believe they may have been injured by a vaccine can learn about the program and about filing a claim by calling 0-683.571.4853 or visiting the [employer] website at www.[facility].gov/vaccinecompensation. There is a time limit to file a claim for compensation. How can I learn more? · Ask your healthcare provider. He or she can give you the vaccine package insert or suggest other sources of information. · Call your local or state health department. · Contact the Centers for Disease Control and Prevention (CDC):  ¨ Call 8-308.146.6143 (1-800-CDC-INFO) or  ¨ Visit CDC's website at www.cdc.gov/flu  Vaccine Information Statement  Inactivated Influenza Vaccine  8/7/2015)  42 SIM[address] [place] [id number]-11  Department of Health and Human Services  Centers for Disease Control and Prevention  Many Vaccine Information Statements are available in Arabic and other languages. See www.immunize.org/vis. Muchas hojas de información sobre vacunas están disponibles en español y en otros idiomas. Visite www.immunize.org/vis. Care instructions adapted under license by [employer] (which disclaims liability or warranty for this information). If you have questions about a medical condition or this instruction, always ask your healthcare professional. [place] [address] any warranty or liability for your use of this information. Pneumococcal Conjugate Vaccine (PCV13): What You Need to Know  Why get vaccinated? Vaccination can protect both children and adults from pneumococcal disease. Pneumococcal disease is caused by bacteria that can spread from person to person through close contact.  It can cause ear infections, and it can also lead to more serious infections of the:  · Lungs (pneumonia). · Blood (bacteremia). · Covering of the brain and spinal cord (meningitis). Pneumococcal pneumonia is most common among adults. Pneumococcal meningitis can cause deafness and brain damage, and it kills about 1 child in 10 who get it. Anyone can get pneumococcal disease, but children under 3years of age and adults 72 years and older, people with certain medical conditions, and cigarette smokers are at the highest risk. Before there was a vaccine, the [facility] saw the following in children under 5 each year from pneumococcal disease:  · More than 700 cases of meningitis  · About 13,000 blood infections  · About 5 million ear infections  · About 200 deaths  Since the vaccine became available, severe pneumococcal disease in these children has fallen by 88%. About 18,000 older adults die of pneumococcal disease each year in the [place]. Treatment of pneumococcal infections with penicillin and other drugs is not as effective as it used to be, because some strains of the disease have become resistant to these drugs. This makes prevention of the disease through vaccination even more important. PCV13 vaccine  Pneumococcal conjugate vaccine (called PCV13) protects against 13 types of pneumococcal bacteria. PCV13 is routinely given to children at 2, 4, 6, and 1515 months of age. It is also recommended for children and adults 3to 59years of age with certain health conditions, and for all adults 72years of age and older. Your doctor can give you details. Some people should not get this vaccine  Anyone who has ever had a life-threatening allergic reaction to a dose of this vaccine, to an earlier pneumococcal vaccine called PCV7, or to any vaccine containing diphtheria toxoid (for example, DTaP), should not get PCV13. Anyone with a severe allergy to any component of PCV13 should not get the vaccine. Tell your doctor if the person being vaccinated has any severe allergies.   If the person scheduled for vaccination is not feeling well, your healthcare provider might decide to reschedule the shot on another day. Risks of a vaccine reaction  With any medicine, including vaccines, there is a chance of reactions. These are usually mild and go away on their own, but serious reactions are also possible. Problems reported following PCV13 varied by age and dose in the series. The most common problems reported among children were:  · About half became drowsy after the shot, had a temporary loss of appetite, or had redness or tenderness where the shot was given. · About 1 out of 3 had swelling where the shot was given. · About 1 out of 3 had a mild fever, and about 1 in 20 had a fever over 102.2°F.  · Up to about 8 out of 10 became fussy or irritable. Adults have reported pain, redness, and swelling where the shot was given; also mild fever, fatigue, headache, chills, or muscle pain. Leila Agrawal children who get PCV13 along with inactivated flu vaccine at the same time may be at increased risk for seizures caused by fever. Ask your doctor for more information. Problems that could happen after any vaccine:  · People sometimes faint after a medical procedure, including vaccination. Sitting or lying down for about 15 minutes can help prevent fainting and the injuries caused by a fall. Tell your doctor if you feel dizzy or have vision changes or ringing in the ears. · Some older children and adults get severe pain in the shoulder and have difficulty moving the arm where a shot was given. This happens very rarely. · Any medication can cause a severe allergic reaction. Such reactions from a vaccine are very rare, estimated at about 1 in a million doses, and would happen within a few minutes to a few hours after the vaccination. As with any medicine, there is a very small chance of a vaccine causing a serious injury or death. The safety of vaccines is always being monitored.  For more information, visit: www.cdc.gov/vaccinesafety. What if there is a serious reaction? What should I look for? · Look for anything that concerns you, such as signs of a severe allergic reaction, very high fever, or unusual behavior. Signs of a severe allergic reaction can include hives, swelling of the face and throat, difficulty breathing, a fast heartbeat, dizziness, and weakness, usually within a few minutes to a few hours after the vaccination. What should I do? · If you think it is a severe allergic reaction or other emergency that can't wait, call 911 or get the person to the nearest hospital. Otherwise, call your doctor. · Reactions should be reported to the Vaccine Adverse Event Reporting System (VAERS). Your doctor should file this report, or you can do it yourself through the VAERS website at www.vaers. hhs.gov, or by calling 9-783.404.8281. VAERS does not give medical advice. The National Vaccine Injury Compensation Program  The National Vaccine Injury Compensation Program (VICP) is a federal program that was created to compensate people who may have been injured by certain vaccines. Persons who believe they may have been injured by a vaccine can learn about the program and about filing a claim by calling 7-711.382.9172 or visiting the [employer] [facility] website at www.[facility].gov/vaccinecompensation. There is a time limit to file a claim for compensation. How can I learn more? · Ask your healthcare provider. He or she can give you the vaccine package insert or suggest other sources of information. · Call your local or state health department. · Contact the Centers for Disease Control and Prevention (CDC):  ¨ Call 3-964.623.5203 (1-800-CDC-INFO) or  ¨ Visit CDC's website at www.cdc.gov/vaccines  Vaccine Information Statement  PCV13 Vaccine  11/5/2015  42 SIMONE Gordon [id number]  Department of Health and Human Services  Centers for Disease Control and Prevention  Many Vaccine Information Statements are available in [place] and other languages. See www.immunize.org/vis. Muchas hojas de información sobre vacunas están disponibles en español y en otros idiomas. Visite www.immunize.org/vis. Care instructions adapted under license by [employer] (which disclaims liability or warranty for this information). If you have questions about a medical condition or this instruction, always ask your healthcare professional. [place] [address] any warranty or liability for your use of this information. Medicare Wellness Visit, Male    The best way to live healthy is to have a healthy lifestyle by eating a well-balanced diet, exercising regularly, limiting alcohol and stopping smoking. Regular physical exams and screening tests are another way to keep healthy. Preventive exams provided by your health care provider can find health problems before they become diseases or illnesses. Preventive services including immunizations, screening tests, monitoring and exams can help you take care of your own health. All people over age 72 should have a pneumovax  and and a prevnar shot to prevent pneumonia. These are once in a lifetime unless you and your provider decide differently. All people over 65 should have a yearly flu shot and a tetanus vaccine every 10 years. Screening for diabetes mellitus with a blood sugar test should be done every year. Glaucoma is a disease of the eye due to increased ocular pressure that can lead to blindness and it should be done every year by an eye professional.    Cardiovascular screening tests that check for elevated lipids (fatty part of blood) which can lead to heart disease and strokes should be done every 5 years. Colorectal screening that evaluates for blood or polyps in your colon should be done yearly as a stool test or every five years as a flexible sigmoidoscope or every 10 years as a colonoscopy up to age 76.     Men up to age 76 may need a screening blood test for prostate cancer at certain intervals, depending on their personal and family history. This decision is between the patient and his provider. If you have been a smoker or had family history of abdominal aortic aneurysms, you and your provider may decide to schedule an ultrasound test of your aorta. Hepatitis C screening is also recommended for anyone born between [birth year] through [address]. A shingles vaccine is also recommended once in a lifetime after age 61. Your Medicare Wellness Exam is recommended annually. Here is a list of your current Health Maintenance items with a due date:  Health Maintenance Due   Topic Date Due    Glaucoma Screening   04/26/1999    Pneumococcal Vaccine (2 of 2 - PCV13) 11/13/2015    Flu Vaccine  08/01/2017    Annual Well Visit  10/27/2017          Helping a Person With Alzheimer's Disease: Care Instructions  Your Care Instructions    Alzheimer's disease is a type of dementia. It affects memory, intelligence, judgment, language, and behavior. It is not clear what causes this disease. But it is the most common form of dementia in older adults. It may take many years to develop. Alzheimer's disease is different than mild memory loss that occurs with aging. Family members usually notice symptoms first. But the person also may realize that something is wrong. Follow-up care is a key part of your loved one's treatment and safety. Be sure to make and go to all appointments, and call your doctor if your loved one is having problems. It's also a good idea to know your loved one's test results and keep a list of the medicines he or she takes. How can you care for your loved one at home? · Develop a routine. The person will feel less frustrated or confused with a clear, simple daily plan. Remind him or her about important facts and events. · Be patient. It may take longer for the person to complete a task than it used to. · Help the person eat a balanced diet.  Serve plenty of whole grains, fruits, and vegetables every day. If the person is not eating well at mealtimes, give snacks at midmorning and in the afternoon. Offer drinks such as Boost, Ensure, or Sustacal if he or she is losing weight. · Encourage exercise. Walking and other activity may slow the decline of mental ability. Help the person keep an active mind. Encourage hobbies such as reading and crossword puzzles. · Take steps to help if the person is sundowning. This is the restless behavior and trouble with sleeping that may occur in late afternoon and at night. Try not to let the person nap during the day. Offer a glass of warm milk or caffeine-free tea before bedtime. · Ask family members and friends for help. You may need breaks where others can help care for the person. · Talk to the person's doctor about what resources are available for help in your area. · Review all of the person's medicines with his or her doctor. · For as long as the person is able, allow him or her to make decisions about activities, food, clothing, and other choices. Let the person be independent, even if tasks take more time or are not done perfectly. Tailor tasks to the person's abilities. For example, if cooking is no longer safe, ask for other help. He or she can help set the table or make simple dishes such as a salad. When the person needs help, offer it gently. Keeping safe  · Make your home (or the person's home) safe. Tack down rugs, and put no-slip tape in the tub. Install handrails, and put safety switches on stoves and appliances. Keep rooms free of clutter. Make sure walkways around furniture are clear. Do not move furniture around, because the person may become confused. · Use locks on doors and cupboards. Lock up knives, scissors, medicines, cleaning supplies, and other dangerous things. · Do not let the person drive or cook if he or she cannot do it safely.  A person with Alzheimer's should not drive unless he or she is able to pass an on-road driving test. Your state 's license bureau can do a driving test if there is any question. · Get medical alert jewelry for the person so you can be contacted if he or she wanders away. If possible, provide a safe place for wandering, such as an enclosed yard or garden. When should you call for help? Call 911 anytime you think you may need emergency care. For example, call if:  ? · A person who has Alzheimer's disease has disappeared. ? · A person who has Alzheimer's disease is seriously injured. ?Call your doctor now or seek immediate medical care if:  ? · The person you are caring for suddenly sees or hears things that are not there (hallucinates). ? · The person you are caring for has a sudden, drastic change in his or her behavior. ? Watch closely for changes in your loved one's health, and be sure to contact the doctor if:  ? · A person who has Alzheimer's disease gradually gets worse or has symptoms that could cause injury. ? · You need help caring for a person with Alzheimer's disease. ? · The person has problems with his or her medicine. Where can you learn more? Go to http://frandy-jose.info/. Enter W912 in the search box to learn more about \"Helping a Person With Alzheimer's Disease: Care Instructions. \"  Current as of: May 12, 2017  Content Version: 11.4  © 0909-6369 [employer]. Care instructions adapted under license by [employer] (which disclaims liability or warranty for this information). If you have questions about a medical condition or this instruction, always ask your healthcare professional. [address] any warranty or liability for your use of this information.

## 2017-11-15 NOTE — PROGRESS NOTES
This is a Subsequent Medicare Annual Wellness Exam (AWV) (Performed 12 months after IPPE or effective date of Medicare Part B enrollment)    I have reviewed the patient's medical history in detail and updated the computerized patient record. History     No c/o. usual memory issues. Here with wife. 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    HX CATARACT REMOVAL Bilateral 09/2017    HX HERNIA REPAIR  6/1/1985    NV COLONOSCOPY FLX DX W/COLLJ SPEC WHEN PFRMD  6/30/2010          Current Outpatient Prescriptions   Medication Sig Dispense Refill    aspirin delayed-release 81 mg tablet Take 81 mg by mouth daily.  fluticasone (FLONASE) 50 mcg/actuation nasal spray 2 Sprays by Both Nostrils route daily. 3 Bottle 3     Allergies   Allergen Reactions    Pcn [Penicillins] Hives     Pt says not allergic    Beef Containing Products Rash     Family History   Problem Relation Age of Onset    Stroke Mother     Heart Disease Mother     Heart Disease Father      Social History   Substance Use Topics    Smoking status: Never Smoker    Smokeless tobacco: Never Used    Alcohol use No     Patient Active Problem List   Diagnosis Code    BPH (benign prostatic hyperplasia) N40.0    Stenosis of carotid artery I65.29    Allergic rhinitis J30.9    Dementia F03.90    Abnormal EKG R94.31       Depression Risk Factor Screening:     PHQ over the last two weeks 11/15/2017   Little interest or pleasure in doing things Several days   Feeling down, depressed or hopeless Several days   Total Score PHQ 2 2   not depressed    Alcohol Risk Factor Screening: You do not drink alcohol or very rarely.       Functional Ability and Level of Safety:   Hearing Loss  Hearing is good. Activities of Daily Living  The home contains: no safety equipment. Patient does total self care    Fall RiskFall Risk Assessment, last 12 mths 11/15/2017   Able to walk? Yes   Fall in past 12 months? No       Abuse Screen  Patient is not abused    Cognitive Screening   Evaluation of Cognitive Function:  Has your family/caregiver stated any concerns about your memory: yes see scanned  Normal, MMSE significant dec 15/30  Previous MMSE 26/30 in 2014    Patient Care Team   Patient Care Team:  Saira Nath MD as PCP - General (Family Practice)  Ness Cancino MD as Physician (Cardiology)  Bernadine Fox derm  [address]    Assessment/Plan   Education and counseling provided:  Pneumococcal Vaccine  Influenza Vaccine  Discussed possible side affects, precautions, and drug interactions and possible benefits of the medication(s). ICD-10-CM ICD-9-CM    1. Encounter for immunization Z23 V03.89 INFLUENZA VIRUS VACCINE, HIGH DOSE SEASONAL, PRESERVATIVE FREE      PNEUMOCOCCAL CONJ VACCINE 13 VALENT IM      ADMIN INFLUENZA VIRUS VAC      CANCELED: ADMIN INFLUENZA VIRUS VAC   2. Medicare annual wellness visit, subsequent Z00.00 V70.0    3. Screening for alcoholism Z13.89 V79.1 NM ANNUAL ALCOHOL SCREEN 15 MIN   4. Screening for depression Z13.89 V79.0 DEPRESSION SCREEN ANNUAL   5. Alzheimer's disease of other onset without behavioral disturbance G30.8 331.0     F02.80 294.10      Orders Placed This Encounter    Depression Screen Annual    Influenza virus vaccine (FLUZONE HIGH-DOSE) 65 years and older (76457)    Pneumococcal Conjugate vaccine - 13 valent (50 years and older)    ADMIN INFLUENZA VIRUS VAC    Annual  Alcohol Screen 15 min ()    memantine (NAMENDA) 10 mg tablet     Sig: Take 0.5 Tabs by mouth daily.  For 6 days then 1 daily     Dispense:  60 Tab     Refill:  1     Discussed with wife she will probably need to administer medications given his declining memory issues. Health Maintenance Due   Topic Date Due    GLAUCOMA SCREENING Q2Y  04/26/1999    Pneumococcal 65+ Low/Medium Risk (2 of 2 - PCV13) 11/13/2015    Influenza Age 9 to Adult  08/01/2017    MEDICARE YEARLY EXAM  10/27/2017     Follow-up Disposition:  Return in about 6 weeks (around 12/27/2017) for routine follow up. Chronic Conditions Addressed Today     1.  Dementia     Relevant Medications     memantine (NAMENDA) 10 mg tablet      Acute Diagnoses Addressed Today     Medicare annual wellness visit, subsequent    -  Primary    Encounter for immunization            Relevant Orders        INFLUENZA VIRUS VACCINE, HIGH DOSE SEASONAL, PRESERVATIVE FREE (Completed)        PNEUMOCOCCAL CONJ VACCINE 13 VALENT IM (Completed)        ADMIN INFLUENZA VIRUS VAC    Screening for alcoholism            Relevant Orders        UT ANNUAL ALCOHOL SCREEN 15 MIN    Screening for depression            Relevant Orders        DEPRESSION SCREEN ANNUAL

## 2017-11-15 NOTE — PROGRESS NOTES
Chief Complaint   Patient presents with   [facility] Annual Wellness Visit     I have reviewed the patient's medical history in detail and updated the computerized patient record. Health Maintenance reviewed. 1. Have you been to the ER, urgent care clinic since your last visit? Hospitalized since your last visit?no    2. Have you seen or consulted any other health care providers outside of the [address] [place] [address] since your last visit? Include any pap smears or colon screening.  No    Pt stated he does have an Advance Directive

## 2017-12-27 ENCOUNTER — OFFICE VISIT (OUTPATIENT)
Dept: INTERNAL MEDICINE CLINIC | Age: 82
End: 2017-12-27

## 2017-12-27 VITALS
RESPIRATION RATE: 14 BRPM | DIASTOLIC BLOOD PRESSURE: 76 MMHG | OXYGEN SATURATION: 99 % | TEMPERATURE: 95.1 F | HEART RATE: 61 BPM | SYSTOLIC BLOOD PRESSURE: 148 MMHG | HEIGHT: 69 IN | BODY MASS INDEX: 19.99 KG/M2 | WEIGHT: 135 LBS

## 2017-12-27 DIAGNOSIS — G30.1 DEMENTIA OF THE ALZHEIMER'S TYPE, WITH LATE ONSET, UNCOMPLICATED (HCC): Primary | ICD-10-CM

## 2017-12-27 DIAGNOSIS — F02.80 DEMENTIA OF THE ALZHEIMER'S TYPE, WITH LATE ONSET, UNCOMPLICATED (HCC): Primary | ICD-10-CM

## 2017-12-27 DIAGNOSIS — J30.1 CHRONIC SEASONAL ALLERGIC RHINITIS DUE TO POLLEN: ICD-10-CM

## 2017-12-27 RX ORDER — MEMANTINE HYDROCHLORIDE 10 MG/1
10 TABLET ORAL 2 TIMES DAILY
Qty: 180 TAB | Refills: 1 | Status: SHIPPED | OUTPATIENT
Start: 2017-12-27 | End: 2018-03-27 | Stop reason: SDUPTHER

## 2017-12-27 NOTE — PROGRESS NOTES
Chief Complaint   Patient presents with    Memory Loss     med evaluation     I have reviewed the patient's medical history in detail and updated the computerized patient record. Health Maintenance reviewed. 1. Have you been to the ER, urgent care clinic since your last visit? Hospitalized since your last visit?no    2. Have you seen or consulted any other health care providers outside of the [address] [place] [address] since your last visit? Include any pap smears or colon screening.  No    Pt stated he does have an Advance Directive

## 2017-12-27 NOTE — PROGRESS NOTES
HISTORY OF PRESENT ILLNESS  Kyle Newby is a 80 y.o. male. Memory Loss    The history is provided by the patient. This is a chronic problem. Progression since onset: has worsened see MMSE but now a little better. Pertinent negatives include no unresponsiveness and no agitation. Mental status baseline is moderate dementia. Risk factors include dementia. His past medical history does not include depression, psychotropic medication treatment or heart disease. No c/o side affects. Wife also put him on prevagen and thinks that they are helping, brighter and more active. Allergies   Allergen Reactions    Pcn [Penicillins] Hives     Pt says not allergic    Beef Containing Products Rash     Social History     Social History    Marital status:      Spouse name: N/A    Number of children: 4    Years of education: N/A     Occupational History     Retired     Social History Main Topics    Smoking status: Never Smoker    Smokeless tobacco: Never Used    Alcohol use No    Drug use: No    Sexual activity: Not Currently     Other Topics Concern    Not on file     Social History Narrative    Lives with wife       Review of Systems   Psychiatric/Behavioral: Positive for memory loss. Negative for agitation. Physical Exam  Visit Vitals    /76 (BP 1 Location: Left arm, BP Patient Position: Sitting)    Pulse 61    Temp 95.1 °F (35.1 °C) (Oral)    Resp 14    Ht 5' 9\" (1.753 m)    Wt 135 lb (61.2 kg)    SpO2 99%    BMI 19.94 kg/m2     WD WN male NAD  Heart RRR without murmers clicks or rubs  Lungs CTA  Abdo soft nontender  Ext no edema    ASSESSMENT and PLAN  Encounter Diagnoses   Name Primary?  Dementia of the Alzheimer's type, with late onset, uncomplicated Yes    Chronic seasonal allergic rhinitis due to pollen      Orders Placed This Encounter    memantine (NAMENDA) 10 mg tablet   Will inc above.   Discussed possible side affects, precautions, and drug interactions and possible benefits of the medication(s). MMSE at the next visit. Follow-up Disposition:  Return in about 3 months (around 3/27/2018) for routine follow up.

## 2017-12-27 NOTE — MR AVS SNAPSHOT
Visit Information Date & Time Provider Department Dept. Phone Encounter #  
 12/27/2017  1:00 PM Perry Junior  [facility] [id number] Follow-up Instructions Return in about 3 months (around 3/27/2018) for routine follow up. Upcoming Health Maintenance Date Due  
 GLAUCOMA SCREENING Q2Y 4/26/1999 MEDICARE YEARLY EXAM 11/16/2018 DTaP/Tdap/Td series (2 - Td) 5/11/2025 Allergies as of 12/27/2017  Review Complete On: 12/27/2017 By: Osmel Kurtz LPN Severity Noted Reaction Type Reactions Pcn [Penicillins] Medium 05/22/2012   Topical Hives Pt says not allergic Beef Containing Products  02/24/2016    Rash Current Immunizations  Reviewed on 10/26/2016 Name Date Influenza High Dose Vaccine PF 11/15/2017, 10/26/2016 Influenza Vaccine 10/17/2013 Influenza Vaccine Laura Vickie) 10/26/2015, 11/13/2014 Influenza Vaccine Split 12/6/2012, 11/16/2011, 10/12/2010 Pneumococcal Conjugate (PCV-13) 11/15/2017 Pneumococcal Polysaccharide (PPSV-23) 11/13/2014 Tdap 5/11/2015 Not reviewed this visit You Were Diagnosed With   
  
 Codes Comments Dementia of the Alzheimer's type, with late onset, uncomplicated    -  Primary ICD-10-CM: G30.1, F02.80 ICD-9-CM: 331.0, 294.10 Chronic seasonal allergic rhinitis due to pollen     ICD-10-CM: J30.1 ICD-9-CM: 477.0 Vitals BP Pulse Temp Resp Height(growth percentile) Weight(growth percentile) 148/76 (BP 1 Location: Left arm, BP Patient Position: Sitting) 61 95.1 °F (35.1 °C) (Oral) 14 5' 9\" (1.753 m) 135 lb (61.2 kg) SpO2 BMI Smoking Status 99% 19.94 kg/m2 Never Smoker Vitals History BMI and BSA Data Body Mass Index Body Surface Area 19.94 kg/m 2 1.73 m 2 Preferred Pharmacy Pharmacy Name [address], [facility] [phone] Your Updated Medication List  
  
 This list is accurate as of: 12/27/17  1:24 PM.  Always use your most recent med list.  
  
  
  
  
 aspirin delayed-release 81 mg tablet Take 81 mg by mouth daily. fluticasone 50 mcg/actuation nasal spray Commonly known as:  Michaelyn Drought 2 Sprays by Both Nostrils route daily. memantine 10 mg tablet Commonly known as:  Johny Deidre Take 1 Tab by mouth two (2) times a day. Prescriptions Sent to Pharmacy Refills  
 memantine (NAMENDA) 10 mg tablet 1 Sig: Take 1 Tab by mouth two (2) times a day. Class: Normal  
 Pharmacy: [address], [address] [place] [address] #: [phone] Route: Oral  
  
Follow-up Instructions Return in about 3 months (around 3/27/2018) for routine follow up. Introducing [facility] SERVICES! Namita Martinez introduces [employer] patient portal. Now you can access parts of your medical record, email your doctor's office, and request medication refills online. 1. In your internet browser, go to [email or web address] 2. Click on the First Time User? Click Here link in the Sign In box. You will see the New Member Sign Up page. 3. Enter your Webch[employer]ney Access Code exactly as it appears below. You will not need to use this code after youve completed the sign-up process. If you do not sign up before the expiration date, you must request a new code. · Web[employer] Access Code: [email or web address] Expires: 3/27/2018  1:01 PM 
 
4. Enter the last four digits of your Social Security Number (xxxx) and Date of Birth (mm/dd/yyyy) as indicated and click Submit. You will be taken to the next sign-up page. 5. Create a uControlt ID. This will be your Webch[employer]ney login ID and cannot be changed, so think of one that is secure and easy to remember. 6. Create a uControlt password. You can change your password at any time. 7. Enter your Password Reset Question and Answer.  This can be used at a later time if you forget your password. 8. Enter your e-mail address. You will receive e-mail notification when new information is available in [address] 9. Click Sign Up. You can now view and download portions of your medical record. 10. Click the Download Summary menu link to download a portable copy of your medical information. If you have questions, please visit the Frequently Asked Questions section of the [employer] website. Remember, [employer] is NOT to be used for urgent needs. For medical emergencies, dial 911. Now available from your iPhone and Android! Please provide this summary of care documentation to your next provider. Your primary care clinician is listed as Kristen Sandhoff. If you have any questions after today's visit, please call [phone].

## 2018-03-22 ENCOUNTER — TELEPHONE (OUTPATIENT)
Dept: INTERNAL MEDICINE CLINIC | Age: 83
End: 2018-03-22

## 2018-03-22 NOTE — TELEPHONE ENCOUNTER
Spoke with patients wife , Valentin Blair, she states that patient fell last night when he got up to the bathroom - this morning legs are still a little shaky - feels weak - /70, Pulse 80 -   Current medications are Namenda and Prevagen, which is an OTC supplement - she wonders if the medications are causing him to feel faint and fall - has an appt with Dr Gabriel Cardenas next week but would like an evaluation before then - she wants to stop the medication but advised her that she shouldn't stop any medication without discussing with a provider - appt given for Inga Aguayo NP for tomorrow since Dr Gabriel Cardenas is out of the office  Jani Kearns, JACKIE  5/66/7458  8:90 PM

## 2018-03-27 ENCOUNTER — OFFICE VISIT (OUTPATIENT)
Dept: INTERNAL MEDICINE CLINIC | Age: 83
End: 2018-03-27

## 2018-03-27 VITALS
TEMPERATURE: 95.3 F | WEIGHT: 135 LBS | DIASTOLIC BLOOD PRESSURE: 68 MMHG | HEIGHT: 69 IN | OXYGEN SATURATION: 99 % | RESPIRATION RATE: 16 BRPM | BODY MASS INDEX: 19.99 KG/M2 | HEART RATE: 61 BPM | SYSTOLIC BLOOD PRESSURE: 148 MMHG

## 2018-03-27 DIAGNOSIS — G30.1 DEMENTIA OF THE ALZHEIMER'S TYPE, WITH LATE ONSET, UNCOMPLICATED (HCC): Primary | ICD-10-CM

## 2018-03-27 DIAGNOSIS — F02.80 DEMENTIA OF THE ALZHEIMER'S TYPE, WITH LATE ONSET, UNCOMPLICATED (HCC): Primary | ICD-10-CM

## 2018-03-27 RX ORDER — MEMANTINE HYDROCHLORIDE 10 MG/1
10 TABLET ORAL 2 TIMES DAILY
Qty: 180 TAB | Refills: 3 | Status: SHIPPED | OUTPATIENT
Start: 2018-03-27 | End: 2019-04-25 | Stop reason: SDUPTHER

## 2018-03-27 NOTE — PROGRESS NOTES
Chief Complaint   Patient presents with    Memory Loss     med follow up     I have reviewed the patient's medical history in detail and updated the computerized patient record. Health Maintenance reviewed. 1. Have you been to the ER, urgent care clinic since your last visit? Hospitalized since your last visit?no    2. Have you seen or consulted any other health care providers outside of the [address] [place] [address] [place] since your last visit? Include any pap smears or colon screening. No    Pt stated he does have an Advance Directive      Fall Risk Assessment, last 12 mths 3/27/2018   Able to walk? Yes   Fall in past 12 months? Yes   Fall with injury? Yes   Number of falls in past 12 months 1   Fall Risk Score 2       PHQ over the last two weeks 3/27/2018   Little interest or pleasure in doing things Several days   Feeling down, depressed or hopeless Several days   Total Score PHQ 2 2       Abuse Screening Questionnaire 3/27/2018   Do you ever feel afraid of your partner? N   Are you in a relationship with someone who physically or mentally threatens you? N   Is it safe for you to go home?  Y       ADL Assessment 3/27/2018   Feeding yourself No Help Needed   Getting from bed to chair No Help Needed   Getting dressed No Help Needed   Bathing or showering No Help Needed   Walk across the room (includes cane/walker) No Help Needed   Using the telphone No Help Needed   Taking your medications No Help Needed   Preparing meals No Help Needed   Managing money (expenses/bills) No Help Needed   Moderately strenuous housework (laundry) No Help Needed   Shopping for personal items (toiletries/medicines) No Help Needed   Shopping for groceries No Help Needed   Driving No Help Needed   Climbing a flight of stairs No Help Needed   Getting to places beyond walking distances No Help Needed

## 2018-03-27 NOTE — PROGRESS NOTES
HISTORY OF PRESENT ILLNESS  Oneida uSn is a 80 y.o. male. Memory Loss    The history is provided by the patient and spouse. This is a chronic problem. The problem has been gradually improving. Pertinent negatives include no confusion, no seizures, no unresponsiveness and no agitation. Mental status baseline is mild dementia. Risk factors include dementia. His past medical history does not include seizures or CVA. Review of Systems   Neurological: Negative for seizures. Psychiatric/Behavioral: Positive for memory loss. Negative for agitation and confusion. Physical Exam  Visit Vitals    /68 (BP 1 Location: Left arm, BP Patient Position: Sitting)    Pulse 61    Temp 95.3 °F (35.2 °C) (Oral)    Resp 16    Ht 5' 9\" (1.753 m)    Wt 135 lb (61.2 kg)    SpO2 99%    BMI 19.94 kg/m2     WD WN male NAD  Heart RRR without murmers clicks or rubs  Lungs CTA  Abdo soft nontender  Ext no edema  See MMSE=20 was 15  ASSESSMENT and PLAN  Encounter Diagnoses   Name Primary?  Dementia of the Alzheimer's type, with late onset, uncomplicated Yes     Orders Placed This Encounter    memantine (NAMENDA) 10 mg tablet     Dementia improved cont Namenda. Inc BP observe for now. Follow-up Disposition:  Return in about 4 months (around 7/27/2018) for routine follow up.

## 2018-03-27 NOTE — MR AVS SNAPSHOT
[address] [facility] 
[phone] Patient: Cortez Patel MRN: [id number] DYT:[birth year] Visit Information Date & Time Provider Department Dept. Phone Encounter #  
 3/27/2018 11:00 AM [place] [address] [phone] [id number] Follow-up Instructions Return in about 4 months (around 7/27/2018) for routine follow up. Upcoming Health Maintenance Date Due  
 GLAUCOMA SCREENING Q2Y 4/26/1999 MEDICARE YEARLY EXAM 11/16/2018 DTaP/Tdap/Td series (2 - Td) 5/11/2025 Allergies as of 3/27/2018  Review Complete On: 3/27/2018 By: Dane Moore LPN Severity Noted Reaction Type Reactions Pcn [Penicillins] Medium 05/22/2012   Topical Hives Pt says not allergic Beef Containing Products  02/24/2016    Rash Current Immunizations  Reviewed on 10/26/2016 Name Date Influenza High Dose Vaccine PF 11/15/2017, 10/26/2016 Influenza Vaccine 10/17/2013 Influenza Vaccine Lorena Msaon) 10/26/2015, 11/13/2014 Influenza Vaccine Split 12/6/2012, 11/16/2011, 10/12/2010 Pneumococcal Conjugate (PCV-13) 11/15/2017 Pneumococcal Polysaccharide (PPSV-23) 11/13/2014 Tdap 5/11/2015 Not reviewed this visit You Were Diagnosed With   
  
 Codes Comments Dementia of the Alzheimer's type, with late onset, uncomplicated    -  Primary ICD-10-CM: G30.1, F02.80 ICD-9-CM: 331.0, 294.10 Vitals BP Pulse Temp Resp Height(growth percentile) Weight(growth percentile) 148/68 (BP 1 Location: Left arm, BP Patient Position: Sitting) 61 95.3 °F (35.2 °C) (Oral) 16 5' 9\" (1.753 m) 135 lb (61.2 kg) SpO2 BMI Smoking Status 99% 19.94 kg/m2 Never Smoker BMI and BSA Data Body Mass Index Body Surface Area 19.94 kg/m 2 1.73 m 2 [phone] Your Updated Medication List  
  
   
This list is accurate as of 3/27/18 12:10 PM.  Always use your most recent med list.  
  
  
  
  
 aspirin delayed-release 81 mg tablet Take 81 mg by mouth daily. fluticasone 50 mcg/actuation nasal spray Commonly known as:  Cheron Samara 2 Sprays by Both Nostrils route daily. memantine 10 mg tablet Commonly known as:  Maraen Rani Take 1 Tab by mouth two (2) times a day. Prescriptions Sent to Pharmacy Refills  
 memantine (NAMENDA) 10 mg tablet 3 Sig: Take 1 Tab by mouth two (2) times a day. Class: Normal  
 Pharmacy: [address], [address] [place] [address] #: [phone] Route: Oral  
  
Follow-up Instructions Return in about 4 months (around 7/27/2018) for routine follow up. Introducing [facility] SERVICES! [facility] introduces [employer] patient portal. Now you can access parts of your medical record, email your doctor's office, and request medication refills online. 1. In your internet browser, go to [email or web address] 2. Click on the First Time User? Click Here link in the Sign In box. You will see the New Member Sign Up page. 3. Enter your Mengcao Access Code exactly as it appears below. You will not need to use this code after youve completed the sign-up process. If you do not sign up before the expiration date, you must request a new code. · M[employer] Access Code: [email or web address] Expires: 3/27/2018  2:01 PM 
 
4. Enter the last four digits of your Social Security Number (xxxx) and Date of Birth (mm/dd/yyyy) as indicated and click Submit. You will be taken to the next sign-up page. 5. Create a Mengcao ID. This will be your Meng[employer]o login ID and cannot be changed, so think of one that is secure and easy to remember. 6. Create a SocioSquare password. You can change your password at any time. 7. Enter your Password Reset Question and Answer. This can be used at a later time if you forget your password. 8. Enter your e-mail address. You will receive e-mail notification when new information is available in [address] 9. Click Sign Up. You can now view and download portions of your medical record. 10. Click the Download Summary menu link to download a portable copy of your medical information. If you have questions, please visit the Frequently Asked Questions section of the [employer] website. Remember, [employer] is NOT to be used for urgent needs. For medical emergencies, dial 911. Now available from your iPhone and Android! Please provide this summary of care documentation to your next provider. Your primary care clinician is listed as Zenaida Oates. If you have any questions after today's visit, please call [phone].

## 2018-07-23 ENCOUNTER — OFFICE VISIT (OUTPATIENT)
Dept: INTERNAL MEDICINE CLINIC | Age: 83
End: 2018-07-23

## 2018-07-23 VITALS
HEART RATE: 68 BPM | OXYGEN SATURATION: 98 % | BODY MASS INDEX: 19.99 KG/M2 | DIASTOLIC BLOOD PRESSURE: 60 MMHG | SYSTOLIC BLOOD PRESSURE: 122 MMHG | HEIGHT: 69 IN | WEIGHT: 135 LBS | RESPIRATION RATE: 16 BRPM | TEMPERATURE: 97.2 F

## 2018-07-23 DIAGNOSIS — G30.8 ALZHEIMER'S DISEASE OF OTHER ONSET WITHOUT BEHAVIORAL DISTURBANCE: Primary | ICD-10-CM

## 2018-07-23 DIAGNOSIS — F02.80 ALZHEIMER'S DISEASE OF OTHER ONSET WITHOUT BEHAVIORAL DISTURBANCE: Primary | ICD-10-CM

## 2018-07-23 RX ORDER — BISMUTH SUBSALICYLATE 262 MG
1 TABLET,CHEWABLE ORAL DAILY
COMMUNITY

## 2018-07-23 NOTE — MR AVS SNAPSHOT
[address]30 [facility] 
[phone] Patient: Marija Lutz MRN: [id number] OZO:[id number] Visit Information Date & Time Provider Department Dept. Phone Encounter #  
 7/23/2018 11:00 AM Sarahy Mott MD [place] [id number] [phone] [id number] Follow-up Instructions Return in about 4 months (around 11/23/2018) for medicare annual.  
  
Upcoming Health Maintenance Date Due  
 GLAUCOMA SCREENING Q2Y 4/26/1999 Influenza Age 5 to Adult 8/1/2018 MEDICARE YEARLY EXAM 11/16/2018 DTaP/Tdap/Td series (2 - Td) 5/11/2025 Allergies as of 7/23/2018  Review Complete On: 7/23/2018 By: Sarahy Mott MD  
  
 Severity Noted Reaction Type Reactions Pcn [Penicillins] Medium 05/22/2012   Topical Hives Pt says not allergic Beef Containing Products  02/24/2016    Rash Current Immunizations  Reviewed on 10/26/2016 Name Date Influenza High Dose Vaccine PF 11/15/2017, 10/26/2016 Influenza Vaccine 10/17/2013 Influenza Vaccine Veronia Lo) 10/26/2015, 11/13/2014 Influenza Vaccine Split 12/6/2012, 11/16/2011, 10/12/2010 Pneumococcal Conjugate (PCV-13) 11/15/2017 Pneumococcal Polysaccharide (PPSV-23) 11/13/2014 Tdap 5/11/2015 Not reviewed this visit You Were Diagnosed With   
  
 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  
  
   
This list is accurate as of 7/23/18 12:13 PM.  Always use your most recent med list.  
  
  
  
  
 aspirin delayed-release 81 mg tablet Take 81 mg by mouth daily. fluticasone 50 mcg/actuation nasal spray Commonly known as:  Luana Reges 2 Sprays by Both Nostrils route daily. memantine 10 mg tablet Commonly known as:  Ángela Samantha Take 1 Tab by mouth two (2) times a day. multivitamin tablet Commonly known as:  ONE A DAY Take 1 Tab by mouth daily. Follow-up Instructions Return in about 4 months (around 11/23/2018) for medicare annual.  
  
  
Introducing [facility]! [facility] introduces [employer] patient portal. Now you can access parts of your medical record, email your doctor's office, and request medication refills online. 1. In your internet browser, go to https://[employer]. [employer] 2. Click on the First Time User? Click Here link in the Sign In box. You will see the New Member Sign Up page. 3. Enter your [employer] Access Code exactly as it appears below. You will not need to use this code after youve completed the sign-up process. If you do not sign up before the expiration date, you must request a new code. · [employer] Access Code: [email or web address] Expires: 10/21/2018 10:54 AM 
 
4. Enter the last four digits of your Social Security Number (xxxx) and Date of Birth (mm/dd/yyyy) as indicated and click Submit. You will be taken to the next sign-up page. 5. Create a [employer] ID. This will be your [employer] login ID and cannot be changed, so think of one that is secure and easy to remember. 6. Create a [employer] password. You can change your password at any time. 7. Enter your Password Reset Question and Answer. This can be used at a later time if you forget your password. 8. Enter your e-mail address. You will receive e-mail notification when new information is available in [address] 9. Click Sign Up. You can now view and download portions of your medical record. 10. Click the Download Summary menu link to download a portable copy of your medical information. If you have questions, please visit the Frequently Asked Questions section of the [employer] website. Remember, [employer] is NOT to be used for urgent needs. For medical emergencies, dial 911. Now available from your iPhone and Android! Please provide this summary of care documentation to your next provider. Your primary care clinician is listed as Ronna Guevara. If you have any questions after today's visit, please call [phone].

## 2018-07-23 NOTE — PROGRESS NOTES
Subjective:     Chief Complaint   Patient presents with    Dementia     follow up        He  is a 80y.o. year old male who presents for evaluation of memory, here with wife says he's better on namenda and prevagen    No incontinence    Current Outpatient Prescriptions   Medication Sig Dispense Refill    multivitamin (ONE A DAY) tablet Take 1 Tab by mouth daily.  memantine (NAMENDA) 10 mg tablet Take 1 Tab by mouth two (2) times a day. 180 Tab 3    aspirin delayed-release 81 mg tablet Take 81 mg by mouth daily.  fluticasone (FLONASE) 50 mcg/actuation nasal spray 2 Sprays by Both Nostrils route daily.  3 Bottle 3     Allergies   Allergen Reactions    Pcn [Penicillins] Hives     Pt says not allergic    Beef Containing Products Rash      Social History     Social History    Marital status:      Spouse name: N/A    Number of children: 3    Years of education: N/A     Occupational History     Retired     Social History Main Topics    Smoking status: Never Smoker    Smokeless tobacco: Never Used    Alcohol use No    Drug use: No    Sexual activity: Not Currently     Other Topics Concern    None     Social History Narrative    Lives with wife      Family History   Problem Relation Age of Onset    Stroke Mother     Heart Disease Mother     Heart Disease Father       Past Surgical History:   Procedure Laterality Date    HX CAROTID ENDARTERECTOMY      Rt.   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    Dementia     ECG abnormal 04/2011    IVCD, RBBB    Hypercholesterolemia     S/P carotid endarterectomy     Right            Objective:     Physical Examination:  Visit Vitals    /60    Pulse 68    Temp 97.2 °F (36.2 °C) (Oral)    Resp 16    Ht 5' 9\" (1.753 m)    Wt 135 lb (61.2 kg)    SpO2 98%    BMI 19.94 kg/m2   -    - General: pleasant, no distress, good eye contact  - Mental status:  Normal mood, behavior, speech, dress, motor activity and thought processes  - Cardiovascular: regular, normal rate, normal S1 and S2, no murmurs/rubs/gallops   - Respiratory: clear to auscultation bilaterally  - Psychiatric: normal mood and affect  -       Labs/Procedures:    No results found for any visits on 07/23/18. Assessment/ Plan:       I have discussed the diagnosis with the patient and the intended plan as seen in the above orders. The patient has received an after-visit summary and questions were answered concerning future plans. I have discussed medication side effects and warnings with the patient as well. ICD-10-CM ICD-9-CM    1. Alzheimer's disease of other onset without behavioral disturbance G30.8 331.0     F02.80 294.10      Orders Placed This Encounter    multivitamin (ONE A DAY) tablet     Sig: Take 1 Tab by mouth daily.      Follow-up Disposition:  Return in about 4 months (around 11/23/2018) for medicare annual.

## 2018-07-23 NOTE — PROGRESS NOTES
Chief Complaint   Patient presents with    Dementia     follow up     I have reviewed the patient's medical history in detail and updated the computerized patient record. Health Maintenance reviewed. 1. Have you been to the ER, urgent care clinic since your last visit? Hospitalized since your last visit?no    2. Have you seen or consulted any other health care providers outside of the [address] [place] [address] [facility] since your last visit? Include any pap smears or colon screening. No    Pt stated he/she does have an Advance Directive    Fall Risk Assessment, last 12 mths 7/23/2018   Able to walk? Yes   Fall in past 12 months? No   Fall with injury? -   Number of falls in past 12 months -   Fall Risk Score -       PHQ over the last two weeks 7/23/2018   Little interest or pleasure in doing things Several days   Feeling down, depressed, irritable, or hopeless Several days   Total Score PHQ 2 2       Abuse Screening Questionnaire 3/27/2018   Do you ever feel afraid of your partner? N   Are you in a relationship with someone who physically or mentally threatens you? N   Is it safe for you to go home?  Y       ADL Assessment 3/27/2018   Feeding yourself No Help Needed   Getting from bed to chair No Help Needed   Getting dressed No Help Needed   Bathing or showering No Help Needed   Walk across the room (includes cane/walker) No Help Needed   Using the telphone No Help Needed   Taking your medications No Help Needed   Preparing meals No Help Needed   Managing money (expenses/bills) No Help Needed   Moderately strenuous housework (laundry) No Help Needed   Shopping for personal items (toiletries/medicines) No Help Needed   Shopping for groceries No Help Needed   Driving No Help Needed   Climbing a flight of stairs No Help Needed   Getting to places beyond walking distances No Help Needed

## 2018-10-22 ENCOUNTER — CLINICAL SUPPORT (OUTPATIENT)
Dept: INTERNAL MEDICINE CLINIC | Age: 83
End: 2018-10-22

## 2018-10-22 VITALS — TEMPERATURE: 97.1 F

## 2018-10-22 DIAGNOSIS — Z23 ENCOUNTER FOR IMMUNIZATION: Primary | ICD-10-CM

## 2018-10-22 NOTE — PROGRESS NOTES
Luke Marie is a 80 y.o. male who presents for routine immunizations. He denies any symptoms , reactions or allergies that would exclude them from being immunized today. Risks and adverse reactions were discussed and the VIS was given to them. All questions were addressed - 0.5ml Influenza Vaccine given IM to right deltoid per verbal order of Tam Rodríguez MD - He was observed for 10 min post injection.  There were no reactions observed - Tanner Arcos, MADELINEN

## 2018-11-21 ENCOUNTER — OFFICE VISIT (OUTPATIENT)
Dept: INTERNAL MEDICINE CLINIC | Age: 83
End: 2018-11-21

## 2018-11-21 VITALS
DIASTOLIC BLOOD PRESSURE: 69 MMHG | HEART RATE: 66 BPM | WEIGHT: 140.4 LBS | BODY MASS INDEX: 20.79 KG/M2 | RESPIRATION RATE: 18 BRPM | TEMPERATURE: 97.6 F | HEIGHT: 69 IN | OXYGEN SATURATION: 98 % | SYSTOLIC BLOOD PRESSURE: 136 MMHG

## 2018-11-21 DIAGNOSIS — Z23 ENCOUNTER FOR IMMUNIZATION: Primary | ICD-10-CM

## 2018-11-21 DIAGNOSIS — Z00.00 MEDICARE ANNUAL WELLNESS VISIT, SUBSEQUENT: ICD-10-CM

## 2018-11-21 DIAGNOSIS — F02.80 ALZHEIMER'S DEMENTIA WITHOUT BEHAVIORAL DISTURBANCE, UNSPECIFIED TIMING OF DEMENTIA ONSET: ICD-10-CM

## 2018-11-21 DIAGNOSIS — Z13.31 SCREENING FOR DEPRESSION: ICD-10-CM

## 2018-11-21 DIAGNOSIS — L72.9 CYST OF SKIN: ICD-10-CM

## 2018-11-21 DIAGNOSIS — Z13.39 SCREENING FOR ALCOHOLISM: ICD-10-CM

## 2018-11-21 DIAGNOSIS — G30.9 ALZHEIMER'S DEMENTIA WITHOUT BEHAVIORAL DISTURBANCE, UNSPECIFIED TIMING OF DEMENTIA ONSET: ICD-10-CM

## 2018-11-21 RX ORDER — MUPIROCIN 20 MG/G
OINTMENT TOPICAL 2 TIMES DAILY
Qty: 22 G | Refills: 0 | Status: SHIPPED | OUTPATIENT
Start: 2018-11-21 | End: 2018-11-28

## 2018-11-21 NOTE — PROGRESS NOTES
This is the Subsequent Medicare Annual Wellness Exam, performed 12 months or more after the Initial AWV or the last Subsequent AWV I have reviewed the patient's medical history in detail and updated the computerized patient record. History Syst on neck has had some pus from it x few weeks. Just popped out Here with his wife who gives much of the history. He does have some dementia, wife says it is stable. Takes his Namenda. Past Medical History:  
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  
Procedure Laterality Date  HX CAROTID ENDARTERECTOMY Rt.  
 HX CATARACT REMOVAL Bilateral 09/2017  HX HERNIA REPAIR  6/1/1985  [place] COLONOSCOPY FLX DX W/COLLJ SPEC WHEN PFRMD  6/30/2010 Current Outpatient Medications Medication Sig Dispense Refill  varicella-zoster recombinant, PF, (SHINGRIX) 50 mcg/0.5 mL susr injection 0.5 mL by IntraMUSCular route once for 1 dose. 0.5 mL 0  
 multivitamin (ONE A DAY) tablet Take 1 Tab by mouth daily.  memantine (NAMENDA) 10 mg tablet Take 1 Tab by mouth two (2) times a day. 180 Tab 3  
 aspirin delayed-release 81 mg tablet Take 81 mg by mouth daily.  fluticasone (FLONASE) 50 mcg/actuation nasal spray 2 Sprays by Both Nostrils route daily. 3 Bottle 3 Allergies Allergen Reactions  Pcn [Penicillins] Hives Pt says not allergic  Beef Containing Products Rash Family History Problem Relation Age of Onset  Stroke Mother  Heart Disease Mother  Heart Disease Father Social History Tobacco Use  Smoking status: Never Smoker  Smokeless tobacco: Never Used Substance Use Topics  Alcohol use: No  
  Alcohol/week: 0.0 oz Patient Active Problem List  
Diagnosis Code  BPH (benign prostatic hyperplasia) N40.0  Stenosis of carotid artery I65.29  
 Allergic rhinitis J30.9  Abnormal EKG R94.31  
 Dementia of the Alzheimer's type, with late onset, uncomplicated Z06.9, A47.95 Depression Risk Factor Screening: PHQ over the last two weeks 11/21/2018 Little interest or pleasure in doing things Not at all Feeling down, depressed, irritable, or hopeless Not at all Total Score PHQ 2 0 Alcohol Risk Factor Screening: You do not drink alcohol or very rarely. Functional Ability and Level of Safety:  
Hearing Loss Hearing is good. Activities of Daily Living The home contains: no safety equipment. Patient does total self care Fall Risk Fall Risk Assessment, last 12 mths 11/21/2018 Able to walk? Yes Fall in past 12 months? No  
Fall with injury? -  
Number of falls in past 12 months - Fall Risk Score -  
 
 
Abuse Screen Patient is not abused Cognitive Screening Evaluation of Cognitive Function: 
Has your family/caregiver stated any concerns about your memory: no no worse than usu Abnormal, Clock Drawing test 
 
Patient Care Team  
Patient Care Team: 
Supa Woods MD as PCP - [facility]) Eloy Pollock MD as Physician (Cardiology) Dr Bayron Cuenca Ms Jasso Aaron in Feb 
 
Visit Vitals /69 (BP 1 Location: Right arm, BP Patient Position: Sitting) Pulse 66 Temp 97.6 °F (36.4 °C) (Oral) Resp 18 Ht 5' 9\" (1.753 m) Wt 140 lb 6.4 oz (63.7 kg) SpO2 98% BMI 20.73 kg/m² WD WN male NAD Heart RRR without murmers clicks or rubs Lungs CTA Abdo soft nontender Ext no edema 1 cm raised nodule on the neck shoulder area on the right. Assessment/Plan Education and counseling provided: 
End-of-Life planning (with patient's consent) ICD-10-CM ICD-9-CM 1. Encounter for immunization Z23 V03.89 varicella-zoster recombinant, PF, (SHINGRIX) 50 mcg/0.5 mL susr injection 2. Medicare annual wellness visit, subsequent Z00.00 V70.0 3. Screening for alcoholism Z13.39 V79.1 MA ANNUAL ALCOHOL SCREEN 15 MIN 4. Screening for depression Z13.31 V79.0 [address] 5. Cyst of skin L72.9 706.2 mupirocin (BACTROBAN) 2 % ointment 6. Alzheimer's dementia without behavioral disturbance, unspecified timing of dementia onset G30.9 331.0 F02.80 294.10 Health Maintenance Due Topic Date Due  Shingrix Vaccine Age 50> (1 of 2) 04/26/1984  GLAUCOMA SCREENING Q2Y  04/26/1999  MEDICARE YEARLY EXAM  11/16/2018 With their permission we spent some time discussing advanced directives and DNR status. Described what elements are involved and the need to have a designated provider. Paperwork involving the above was given to the patient. Follow-up Disposition: 
Return in about 4 weeks (around 12/19/2018) for routine follow up. We will remove the lesion if it still there when he comes back does appear to possibly be a skin cancer. Or I can refer him back to his dermatologist. 
Follow-up Disposition: 
Return in about 4 weeks (around 12/19/2018) for routine follow up.

## 2018-11-21 NOTE — PATIENT INSTRUCTIONS
Medicare Wellness Visit, Male The best way to live healthy is to have a lifestyle where you eat a well-balanced diet, exercise regularly, limit alcohol use, and quit all forms of tobacco/nicotine, if applicable. Regular preventive services are another way to keep healthy. Preventive services (vaccines, screening tests, monitoring & exams) can help personalize your care plan, which helps you manage your own care. Screening tests can find health problems at the earliest stages, when they are easiest to treat. [address] [facility] follows the current, evidence-based guidelines published by the [facility] (RUSTSTF) when recommending preventive services for our patients. Because we follow these guidelines, sometimes recommendations change over time as research supports it. (For example, a prostate screening blood test is no longer routinely recommended for men with no symptoms.) Of course, you and your doctor may decide to screen more often for some diseases, based on your risk and co-morbidities (chronic disease you are already diagnosed with). Preventive services for you include: - Medicare offers their members a free annual wellness visit, which is time for you and your primary care provider to discuss and plan for your preventive service needs. Take advantage of this benefit every year! 
-All adults over age 72 should receive the recommended pneumonia vaccines. Current USPSTF guidelines recommend a series of two vaccines for the best pneumonia protection.  
-All adults should have a flu vaccine yearly and an ECG.  All adults age 61 and older should receive a shingles vaccine once in their lifetime.   
-All adults age 38-68 who are overweight should have a diabetes screening test once every three years.  
-Other screening tests & preventive services for persons with diabetes include: an eye exam to screen for diabetic retinopathy, a kidney function test, a foot exam, and stricter control over your cholesterol.  
-Cardiovascular screening for adults with routine risk involves an electrocardiogram (ECG) at intervals determined by the provider.  
-Colorectal cancer screening should be done for adults age 54-65 with no increased risk factors for colorectal cancer. There are a number of acceptable methods of screening for this type of cancer. Each test has its own benefits and drawbacks. Discuss with your provider what is most appropriate for you during your annual wellness visit. The different tests include: colonoscopy (considered the best screening method), a fecal occult blood test, a fecal DNA test, and sigmoidoscopy. 
-All adults born between [facility] should be screened once for Hepatitis C. 
-An Abdominal Aortic Aneurysm (AAA) Screening is recommended for men age 73-68 who has ever smoked in their lifetime. Here is a list of your current Health Maintenance items (your personalized list of preventive services) with a due date: 
Health Maintenance Due Topic Date Due  Shingles Vaccine (1 of 2) 04/26/1984  Glaucoma Screening   04/26/1999 [address] [place] [address] Annual Well Visit  11/16/2018

## 2018-11-21 NOTE — PROGRESS NOTES
Chief Complaint Patient presents with [facility] Annual Wellness Visit  Cyst  
  RIGHT SIDE OF WEEK X 3 WEEKS. PUS CAME OUT OF CYST.  
 
1. Have you been to the ER, urgent care clinic since your last visit? Hospitalized since your last visit? No 
 
2. Have you seen or consulted any other health care providers outside of the [address] [place] [address] since your last visit? Include any pap smears or colon screening. No  
 
Health Maintenance Due Topic Date Due  Shingrix Vaccine Age 50> (1 of 2) 04/26/1984  GLAUCOMA SCREENING Q2Y  04/26/1999  MEDICARE YEARLY EXAM  11/16/2018 Do you have an [address] in place in the event that you have a healthcare crisis that could impact your decision making as it pertains to your health? YES Would you like information about Advance Care Planning? NO Information given. NO Patient will bring ACP at next visit.

## 2018-11-29 ENCOUNTER — TELEPHONE (OUTPATIENT)
Dept: INTERNAL MEDICINE CLINIC | Age: 83
End: 2018-11-29

## 2018-11-29 NOTE — TELEPHONE ENCOUNTER
Pts wife called wanting to let Dr. Louisa Mayers know that she took her  to the dermatologist for the spot he had. She said a follow up appt is not needed because the dermatologist is taking care of it.

## 2019-04-25 ENCOUNTER — OFFICE VISIT (OUTPATIENT)
Dept: INTERNAL MEDICINE CLINIC | Age: 84
End: 2019-04-25

## 2019-04-25 VITALS
RESPIRATION RATE: 16 BRPM | BODY MASS INDEX: 21.48 KG/M2 | DIASTOLIC BLOOD PRESSURE: 68 MMHG | HEIGHT: 69 IN | WEIGHT: 145 LBS | OXYGEN SATURATION: 96 % | SYSTOLIC BLOOD PRESSURE: 140 MMHG | HEART RATE: 67 BPM | TEMPERATURE: 96.5 F

## 2019-04-25 DIAGNOSIS — E53.8 VITAMIN B 12 DEFICIENCY: ICD-10-CM

## 2019-04-25 DIAGNOSIS — G30.9 ALZHEIMER'S DEMENTIA WITHOUT BEHAVIORAL DISTURBANCE, UNSPECIFIED TIMING OF DEMENTIA ONSET: Primary | ICD-10-CM

## 2019-04-25 DIAGNOSIS — F02.80 ALZHEIMER'S DEMENTIA WITHOUT BEHAVIORAL DISTURBANCE, UNSPECIFIED TIMING OF DEMENTIA ONSET: Primary | ICD-10-CM

## 2019-04-25 RX ORDER — MEMANTINE HYDROCHLORIDE 10 MG/1
10 TABLET ORAL 2 TIMES DAILY
Qty: 180 TAB | Refills: 3 | Status: SHIPPED | OUTPATIENT
Start: 2019-04-25 | End: 2020-01-01 | Stop reason: SDUPTHER

## 2019-04-25 NOTE — PROGRESS NOTES
HISTORY OF PRESENT ILLNESS Brandon Jaquez is a 80 y.o. male. HPI 
dup ROS 
dup Physical Exam 
dup ASSESSMENT and PLAN Encounter Diagnoses Name Primary?  Alzheimer's dementia without behavioral disturbance, unspecified timing of dementia onset Yes  Vitamin B 12 deficiency Orders Placed This Encounter  METABOLIC PANEL, BASIC  TSH 3RD GENERATION  
 VITAMIN B12  
 memantine (NAMENDA) 10 mg tablet

## 2019-04-25 NOTE — PROGRESS NOTES
HISTORY OF PRESENT ILLNESS Justina Bella is a 80 y.o. male. Memory Loss The history is provided by the patient and spouse. This is a chronic problem. The problem has not changed since onset. Pertinent negatives include no confusion, no agitation, no delusions, no hallucinations, no self-injury and no violence. Mental status baseline is mild dementia. Review of Systems Psychiatric/Behavioral: Positive for memory loss. Negative for agitation, confusion, hallucinations and self-injury. Past Surgical History:  
Procedure Laterality Date  HX CAROTID ENDARTERECTOMY Rt.  
 HX CATARACT REMOVAL Bilateral 09/2017  HX HERNIA REPAIR  6/1/1985  [place] COLONOSCOPY FLX DX W/COLLJ SPEC WHEN PFRMD  6/30/2010 Social History Socioeconomic History  Marital status:  Spouse name: Not on file  Number of children: 4  
 Years of education: Not on file  Highest education level: Not on file Occupational History Employer: RETIRED Social Needs  Financial resource strain: Not on file  Food insecurity:  
  Worry: Not on file Inability: Not on file  Transportation needs:  
  Medical: Not on file Non-medical: Not on file Tobacco Use  Smoking status: Never Smoker  Smokeless tobacco: Never Used Substance and Sexual Activity  Alcohol use: No  
  Alcohol/week: 0.0 oz  Drug use: No  
 Sexual activity: Not Currently Lifestyle  Physical activity:  
  Days per week: Not on file Minutes per session: Not on file  Stress: Not on file Relationships  Social connections:  
  Talks on phone: Not on file Gets together: Not on file Attends Synagogue service: Not on file Active member of club or organization: Not on file Attends meetings of clubs or organizations: Not on file Relationship status: Not on file  Intimate partner violence:  
  Fear of current or ex partner: Not on file Emotionally abused: Not on file Physically abused: Not on file Forced sexual activity: Not on file Other Topics Concern  Not on file Social History Narrative Lives with wife Physical Exam 
Visit Vitals /68 Pulse 67 Temp 96.5 °F (35.8 °C) (Temporal) Resp 16 Ht 5' 9\" (1.753 m) Wt 145 lb (65.8 kg) SpO2 96% BMI 21.41 kg/m² WD WN male NAD Heart RRR without murmers clicks or rubs Lungs CTA Abdo soft nontender Ext no edema ASSESSMENT and PLAN Encounter Diagnoses Name Primary?  Alzheimer's dementia without behavioral disturbance, unspecified timing of dementia onset Yes  Vitamin B 12 deficiency Orders Placed This Encounter  METABOLIC PANEL, BASIC  TSH 3RD GENERATION  
 VITAMIN B12  
 memantine (NAMENDA) 10 mg tablet Follow-up and Dispositions · Return in about 5 months (around 9/25/2019) for routine follow up.

## 2019-04-25 NOTE — PROGRESS NOTES
Chief Complaint Patient presents with  Hypertension  
  med refills I have reviewed the patient's medical history in detail and updated the computerized patient record. Health Maintenance reviewed. 1. Have you been to the ER, urgent care clinic since your last visit? Hospitalized since your last visit?no 2. Have you seen or consulted any other health care providers outside of the [address] [place] [address] [facility] since your last visit? Include any pap smears or colon screening. No 
 
Pt stated he/she does have an Advance Directive Fall Risk Assessment, last 12 mths 4/25/2019 Able to walk? Yes Fall in past 12 months? No  
Fall with injury? -  
Number of falls in past 12 months - Fall Risk Score -  
 
 
3 most recent PHQ Screens 4/25/2019 Little interest or pleasure in doing things Several days Feeling down, depressed, irritable, or hopeless Several days Total Score PHQ 2 2 Abuse Screening Questionnaire 4/25/2019 Do you ever feel afraid of your partner? Tracey Ores Are you in a relationship with someone who physically or mentally threatens you? Tracey Ores Is it safe for you to go home? Y  
 
 

## 2019-04-26 LAB
BUN SERPL-MCNC: 15 MG/DL (ref 8–27)
BUN/CREAT SERPL: 17 (ref 10–24)
CALCIUM SERPL-MCNC: 9.3 MG/DL (ref 8.6–10.2)
CHLORIDE SERPL-SCNC: 97 MMOL/L (ref 96–106)
CO2 SERPL-SCNC: 25 MMOL/L (ref 20–29)
CREAT SERPL-MCNC: 0.86 MG/DL (ref 0.76–1.27)
GLUCOSE SERPL-MCNC: 71 MG/DL (ref 65–99)
POTASSIUM SERPL-SCNC: 4.8 MMOL/L (ref 3.5–5.2)
SODIUM SERPL-SCNC: 139 MMOL/L (ref 134–144)
TSH SERPL DL<=0.005 MIU/L-ACNC: 1.76 UIU/ML (ref 0.45–4.5)
VIT B12 SERPL-MCNC: 475 PG/ML (ref 232–1245)

## 2019-05-14 ENCOUNTER — OFFICE VISIT (OUTPATIENT)
Dept: INTERNAL MEDICINE CLINIC | Age: 84
End: 2019-05-14

## 2019-05-14 ENCOUNTER — TELEPHONE (OUTPATIENT)
Dept: INTERNAL MEDICINE CLINIC | Age: 84
End: 2019-05-14

## 2019-05-14 VITALS
HEART RATE: 66 BPM | HEIGHT: 69 IN | DIASTOLIC BLOOD PRESSURE: 80 MMHG | RESPIRATION RATE: 16 BRPM | BODY MASS INDEX: 21.48 KG/M2 | OXYGEN SATURATION: 96 % | TEMPERATURE: 97.8 F | SYSTOLIC BLOOD PRESSURE: 147 MMHG | WEIGHT: 145 LBS

## 2019-05-14 DIAGNOSIS — R00.1 BRADYCARDIA: Primary | ICD-10-CM

## 2019-05-14 DIAGNOSIS — F02.80 ALZHEIMER'S DEMENTIA WITHOUT BEHAVIORAL DISTURBANCE, UNSPECIFIED TIMING OF DEMENTIA ONSET: ICD-10-CM

## 2019-05-14 DIAGNOSIS — G30.9 ALZHEIMER'S DEMENTIA WITHOUT BEHAVIORAL DISTURBANCE, UNSPECIFIED TIMING OF DEMENTIA ONSET: ICD-10-CM

## 2019-05-14 NOTE — PROGRESS NOTES
Chief Complaint Patient presents with  
 Heart Problem  
  low heart rate I have reviewed the patient's medical history in detail and updated the computerized patient record. Health Maintenance reviewed. 1. Have you been to the ER, urgent care clinic since your last visit? Hospitalized since your last visit?no 2. Have you seen or consulted any other health care providers outside of the [address] [place] [address] since your last visit? Include any pap smears or colon screening. No 
 
 
Encouraged pt to discuss pt's wishes with spouse/partner/family and bring them in the next appt to follow thru with the Advanced Directive Fall Risk Assessment, last 12 mths 5/14/2019 Able to walk? Yes Fall in past 12 months? No  
Fall with injury? -  
Number of falls in past 12 months - Fall Risk Score -  
 
 
3 most recent PHQ Screens 5/14/2019 Little interest or pleasure in doing things Several days Feeling down, depressed, irritable, or hopeless Several days Total Score PHQ 2 2 Abuse Screening Questionnaire 4/25/2019 Do you ever feel afraid of your partner? Mary La Barge Are you in a relationship with someone who physically or mentally threatens you? Mary La Barge Is it safe for you to go home? Y  
 
 
ADL Assessment 4/25/2019 Feeding yourself No Help Needed Getting from bed to chair No Help Needed Getting dressed No Help Needed Bathing or showering No Help Needed Walk across the room (includes cane/walker) No Help Needed Using the telphone No Help Needed Taking your medications No Help Needed Preparing meals No Help Needed Managing money (expenses/bills) No Help Needed Moderately strenuous housework (laundry) No Help Needed Shopping for personal items (toiletries/medicines) No Help Needed Shopping for groceries No Help Needed Driving No Help Needed Climbing a flight of stairs No Help Needed Getting to places beyond walking distances No Help Needed Last night BP taken 7-8PM  141 56 - 35 
                       140 55 - 36 
            151 57 - 36  
                                             136 59 - 35 BP taken 9AM today             146 71 - 63

## 2019-05-14 NOTE — TELEPHONE ENCOUNTER
Pts wife, Myla Reid, called in reference to wanting to know what she should do about her 's pulse.  Please call her at [phone]

## 2019-05-14 NOTE — TELEPHONE ENCOUNTER
Called and spoke with patients wife Oj Payen - she is concerned that her husbands heart rate drops low at night - last night was 35 bpm - ranges in the low 60s in the mornings, but blood pressure has been running a little bit high - states that patient has gotten rather weak at times and one time just \"crumpled\" to the ground when trying to get groceries from the car - as the day progresses heart rate gets slower and has been in the 30s and 40s at night before bed - appointment was given to patient for this afternoon to be evaluated by Dr Karey Arrieta, MADELINEN  0/79/5227  7:20 PM

## 2019-05-16 NOTE — PROGRESS NOTES
HISTORY OF PRESENT ILLNESS Kj Edwards is a 80 y.o. male. Slow Heart Rate The history is provided by the patient and spouse. This is a new problem. The current episode started 2 days ago. The problem occurs daily. The problem has not changed since onset. Pertinent negatives include no chest pain, no abdominal pain and no shortness of breath. Nothing relieves the symptoms. He has no c/o no syncope MS same. She checks her BP and checks her , seems to get lower in the PM 30's and 40's Current Outpatient Medications Medication Sig Dispense Refill  memantine (NAMENDA) 10 mg tablet Take 1 Tab by mouth two (2) times a day. 180 Tab 3  
 multivitamin (ONE A DAY) tablet Take 1 Tab by mouth daily.  aspirin delayed-release 81 mg tablet Take 81 mg by mouth daily.  fluticasone (FLONASE) 50 mcg/actuation nasal spray 2 Sprays by Both Nostrils route daily. 3 Bottle 3 Allergies Allergen Reactions  Pcn [Penicillins] Hives Pt says not allergic  Beef Containing Products Rash Review of Systems Constitutional: Negative for diaphoresis and malaise/fatigue. Respiratory: Negative for shortness of breath. Cardiovascular: Negative for chest pain. Gastrointestinal: Negative for abdominal pain. Neurological: Negative for focal weakness, loss of consciousness and weakness. Social History Socioeconomic History  Marital status:  Spouse name: Not on file  Number of children: 4  
 Years of education: Not on file  Highest education level: Not on file Occupational History Employer: RETIRED Social Needs  Financial resource strain: Not on file  Food insecurity:  
  Worry: Not on file Inability: Not on file  Transportation needs:  
  Medical: Not on file Non-medical: Not on file Tobacco Use  Smoking status: Never Smoker  Smokeless tobacco: Never Used Substance and Sexual Activity  Alcohol use:  No  
 Alcohol/week: 0.0 oz  Drug use: No  
 Sexual activity: Not Currently Lifestyle  Physical activity:  
  Days per week: Not on file Minutes per session: Not on file  Stress: Not on file Relationships  Social connections:  
  Talks on phone: Not on file Gets together: Not on file Attends Hinduism service: Not on file Active member of club or organization: Not on file Attends meetings of clubs or organizations: Not on file Relationship status: Not on file  Intimate partner violence:  
  Fear of current or ex partner: Not on file Emotionally abused: Not on file Physically abused: Not on file Forced sexual activity: Not on file Other Topics Concern  Not on file Social History Narrative Lives with wife Physical Exam 
Visit Vitals /80 (BP 1 Location: Left arm, BP Patient Position: Sitting) Pulse 66 Temp 97.8 °F (36.6 °C) (Oral) Resp 16 Ht 5' 9\" (1.753 m) Wt 145 lb (65.8 kg) SpO2 96% BMI 21.41 kg/m² WD WN male NAD Heart HR 56 without murmers clicks or rubs Lungs CTA Abdo soft nontender Ext no edema EKG NO difference RBBB NAD 
ASSESSMENT and PLAN Encounter Diagnoses Name Primary?  Bradycardia Yes  Alzheimer's dementia without behavioral disturbance, unspecified timing of dementia onset Orders Placed This Encounter  REFERRAL TO CARDIOLOGY  AMB POC EKG ROUTINE W/ 12 LEADS, INTER & REP Given lack of Sx and min change from previous do not feel further w/u is needed, but wife wants to see cards. OK Dementia stable Follow-up and Dispositions · Return if symptoms worsen or fail to improve.

## 2019-05-22 ENCOUNTER — HOSPITAL ENCOUNTER (INPATIENT)
Age: 84
LOS: 1 days | Discharge: HOME | DRG: 309 | End: 2019-05-23
Attending: EMERGENCY MEDICINE | Admitting: HOSPITALIST
Payer: MEDICARE

## 2019-05-22 ENCOUNTER — APPOINTMENT (OUTPATIENT)
Dept: GENERAL RADIOLOGY | Age: 84
DRG: 309 | End: 2019-05-22
Attending: EMERGENCY MEDICINE
Payer: MEDICARE

## 2019-05-22 ENCOUNTER — APPOINTMENT (OUTPATIENT)
Dept: CT IMAGING | Age: 84
DRG: 309 | End: 2019-05-22
Attending: EMERGENCY MEDICINE
Payer: MEDICARE

## 2019-05-22 DIAGNOSIS — I48.92 ATRIAL FLUTTER, PAROXYSMAL (HCC): Primary | ICD-10-CM

## 2019-05-22 DIAGNOSIS — R29.898 WEAKNESS OF BOTH LOWER EXTREMITIES: ICD-10-CM

## 2019-05-22 PROBLEM — I48.91 NEW ONSET A-FIB (HCC): Status: ACTIVE | Noted: 2019-05-22

## 2019-05-22 PROBLEM — I48.91 A-FIB (HCC): Status: ACTIVE | Noted: 2019-05-22

## 2019-05-22 LAB
ALBUMIN SERPL-MCNC: 3.5 G/DL (ref 3.5–5)
ALBUMIN/GLOB SERPL: 0.9 {RATIO} (ref 1.1–2.2)
ALP SERPL-CCNC: 99 U/L (ref 45–117)
ALT SERPL-CCNC: 29 U/L (ref 12–78)
ANION GAP SERPL CALC-SCNC: 3 MMOL/L (ref 5–15)
AST SERPL-CCNC: 37 U/L (ref 15–37)
ATRIAL RATE: 357 BPM
BASOPHILS # BLD: 0 K/UL (ref 0–0.1)
BASOPHILS NFR BLD: 0 % (ref 0–1)
BILIRUB SERPL-MCNC: 0.4 MG/DL (ref 0.2–1)
BUN SERPL-MCNC: 13 MG/DL (ref 6–20)
BUN/CREAT SERPL: 15 (ref 12–20)
CALCIUM SERPL-MCNC: 8.7 MG/DL (ref 8.5–10.1)
CALCULATED R AXIS, ECG10: 90 DEGREES
CALCULATED T AXIS, ECG11: 73 DEGREES
CHLORIDE SERPL-SCNC: 98 MMOL/L (ref 97–108)
CK SERPL-CCNC: 77 U/L (ref 39–308)
CO2 SERPL-SCNC: 32 MMOL/L (ref 21–32)
CREAT SERPL-MCNC: 0.87 MG/DL (ref 0.7–1.3)
DIAGNOSIS, 93000: NORMAL
DIFFERENTIAL METHOD BLD: NORMAL
EOSINOPHIL # BLD: 0.2 K/UL (ref 0–0.4)
EOSINOPHIL NFR BLD: 2 % (ref 0–7)
ERYTHROCYTE [DISTWIDTH] IN BLOOD BY AUTOMATED COUNT: 12.8 % (ref 11.5–14.5)
GLOBULIN SER CALC-MCNC: 4.1 G/DL (ref 2–4)
GLUCOSE SERPL-MCNC: 80 MG/DL (ref 65–100)
HCT VFR BLD AUTO: 45.2 % (ref 36.6–50.3)
HGB BLD-MCNC: 15.3 G/DL (ref 12.1–17)
IMM GRANULOCYTES # BLD AUTO: 0 K/UL (ref 0–0.04)
IMM GRANULOCYTES NFR BLD AUTO: 0 % (ref 0–0.5)
LYMPHOCYTES # BLD: 1.9 K/UL (ref 0.8–3.5)
LYMPHOCYTES NFR BLD: 22 % (ref 12–49)
MCH RBC QN AUTO: 31.2 PG (ref 26–34)
MCHC RBC AUTO-ENTMCNC: 33.8 G/DL (ref 30–36.5)
MCV RBC AUTO: 92.2 FL (ref 80–99)
MONOCYTES # BLD: 1 K/UL (ref 0–1)
MONOCYTES NFR BLD: 12 % (ref 5–13)
NEUTS SEG # BLD: 5.6 K/UL (ref 1.8–8)
NEUTS SEG NFR BLD: 64 % (ref 32–75)
NRBC # BLD: 0 K/UL (ref 0–0.01)
NRBC BLD-RTO: 0 PER 100 WBC
PLATELET # BLD AUTO: 265 K/UL (ref 150–400)
PMV BLD AUTO: 9 FL (ref 8.9–12.9)
POTASSIUM SERPL-SCNC: 4.1 MMOL/L (ref 3.5–5.1)
PROT SERPL-MCNC: 7.6 G/DL (ref 6.4–8.2)
Q-T INTERVAL, ECG07: 410 MS
QRS DURATION, ECG06: 138 MS
QTC CALCULATION (BEZET), ECG08: 457 MS
RBC # BLD AUTO: 4.9 M/UL (ref 4.1–5.7)
SODIUM SERPL-SCNC: 133 MMOL/L (ref 136–145)
TROPONIN I SERPL-MCNC: 0.1 NG/ML
TROPONIN I SERPL-MCNC: 0.1 NG/ML
VENTRICULAR RATE, ECG03: 75 BPM
WBC # BLD AUTO: 8.7 K/UL (ref 4.1–11.1)

## 2019-05-22 PROCEDURE — 36415 COLL VENOUS BLD VENIPUNCTURE: CPT

## 2019-05-22 PROCEDURE — 99218 HC RM OBSERVATION: CPT

## 2019-05-22 PROCEDURE — 70450 CT HEAD/BRAIN W/O DYE: CPT

## 2019-05-22 PROCEDURE — 99285 EMERGENCY DEPT VISIT HI MDM: CPT

## 2019-05-22 PROCEDURE — 84484 ASSAY OF TROPONIN QUANT: CPT

## 2019-05-22 PROCEDURE — 71046 X-RAY EXAM CHEST 2 VIEWS: CPT

## 2019-05-22 PROCEDURE — 80053 COMPREHEN METABOLIC PANEL: CPT

## 2019-05-22 PROCEDURE — 85025 COMPLETE CBC W/AUTO DIFF WBC: CPT

## 2019-05-22 PROCEDURE — 93005 ELECTROCARDIOGRAM TRACING: CPT

## 2019-05-22 PROCEDURE — 74011250637 HC RX REV CODE- 250/637: Performed by: INTERNAL MEDICINE

## 2019-05-22 PROCEDURE — 82550 ASSAY OF CK (CPK): CPT

## 2019-05-22 PROCEDURE — 74011250636 HC RX REV CODE- 250/636: Performed by: INTERNAL MEDICINE

## 2019-05-22 RX ORDER — SODIUM CHLORIDE 0.9 % (FLUSH) 0.9 %
5-40 SYRINGE (ML) INJECTION AS NEEDED
Status: DISCONTINUED | OUTPATIENT
Start: 2019-05-22 | End: 2019-05-23 | Stop reason: HOSPADM

## 2019-05-22 RX ORDER — FLUTICASONE PROPIONATE 50 MCG
2 SPRAY, SUSPENSION (ML) NASAL
COMMUNITY
End: 2019-10-21 | Stop reason: ALTCHOICE

## 2019-05-22 RX ORDER — MEMANTINE HYDROCHLORIDE 10 MG/1
10 TABLET ORAL 2 TIMES DAILY
Status: DISCONTINUED | OUTPATIENT
Start: 2019-05-22 | End: 2019-05-23 | Stop reason: HOSPADM

## 2019-05-22 RX ORDER — HEPARIN SODIUM 5000 [USP'U]/ML
5000 INJECTION, SOLUTION INTRAVENOUS; SUBCUTANEOUS EVERY 8 HOURS
Status: DISCONTINUED | OUTPATIENT
Start: 2019-05-22 | End: 2019-05-23 | Stop reason: HOSPADM

## 2019-05-22 RX ORDER — ONDANSETRON 2 MG/ML
4 INJECTION INTRAMUSCULAR; INTRAVENOUS
Status: DISCONTINUED | OUTPATIENT
Start: 2019-05-22 | End: 2019-05-23 | Stop reason: HOSPADM

## 2019-05-22 RX ORDER — ASPIRIN 81 MG/1
81 TABLET ORAL
Status: DISCONTINUED | OUTPATIENT
Start: 2019-05-22 | End: 2019-05-23 | Stop reason: HOSPADM

## 2019-05-22 RX ORDER — DOCUSATE SODIUM 100 MG/1
100 CAPSULE, LIQUID FILLED ORAL 2 TIMES DAILY
Status: DISCONTINUED | OUTPATIENT
Start: 2019-05-22 | End: 2019-05-23 | Stop reason: HOSPADM

## 2019-05-22 RX ORDER — ACETAMINOPHEN 325 MG/1
650 TABLET ORAL
Status: DISCONTINUED | OUTPATIENT
Start: 2019-05-22 | End: 2019-05-23 | Stop reason: HOSPADM

## 2019-05-22 RX ORDER — HALOPERIDOL 5 MG/ML
5 INJECTION INTRAMUSCULAR
Status: DISCONTINUED | OUTPATIENT
Start: 2019-05-22 | End: 2019-05-23 | Stop reason: HOSPADM

## 2019-05-22 RX ORDER — SODIUM CHLORIDE 0.9 % (FLUSH) 0.9 %
5-40 SYRINGE (ML) INJECTION EVERY 8 HOURS
Status: DISCONTINUED | OUTPATIENT
Start: 2019-05-22 | End: 2019-05-23 | Stop reason: HOSPADM

## 2019-05-22 RX ADMIN — MEMANTINE HYDROCHLORIDE 10 MG: 10 TABLET ORAL at 21:38

## 2019-05-22 RX ADMIN — DOCUSATE SODIUM 100 MG: 100 CAPSULE, LIQUID FILLED ORAL at 21:38

## 2019-05-22 RX ADMIN — ASPIRIN 81 MG: 81 TABLET ORAL at 21:38

## 2019-05-22 RX ADMIN — Medication 10 ML: at 21:39

## 2019-05-22 RX ADMIN — HEPARIN SODIUM 5000 UNITS: 5000 INJECTION INTRAVENOUS; SUBCUTANEOUS at 21:38

## 2019-05-22 NOTE — PROGRESS NOTES
Pharmacy Clarification of Prior to Admission Medication Regimen The patient was interviewed regarding clarification of the prior to admission medication regimen. Patient's wife was present in room and obtained permission from patient to discuss drug regimen with visitor(s) present. Patient's wife was questioned regarding use of any other inhalers, topical products, over the counter medications, herbal medications, vitamin products or ophthalmic/nasal/otic medication use. Information Obtained From: Patient's wife, Medication bottles, Rx query Pertinent Pharmacy Findings: 
Updated patient?s preferred outpatient pharmacy to: [facility] [facility] medication list was corrected to the following:  
 
Prior to Admission Medications Prescriptions Last Dose Informant Patient Reported? Taking? OTHER 2019 at Unknown time Significant Other Yes Yes Sig: Take 1 Tab by mouth daily. Prevagen  
acetaminophen/chlorpheniramine (CORICIDIN PO) 5/15/2019 at Unknown time Significant Other Yes Yes Sig: Take 1 Tab by mouth daily. ascorbic acid/multivit-min (EMERGEN-C PO) 2019 Significant Other Yes Yes Sig: Take 1 Packet by mouth daily as needed (immune boost). aspirin delayed-release 81 mg tablet 2019 at Unknown time Significant Other Yes Yes Sig: Take 81 mg by mouth nightly. carboxymethylcellulose sodium (REFRESH TEARS OP) 2019 at Unknown time Significant Other Yes Yes Sig: Administer 1-2 Drops to both eyes two (2) times a day. fluticasone propionate (FLONASE) 50 mcg/actuation nasal spray 3/22/2019 Significant Other Yes Yes Si Sprays by Both Nostrils route daily as needed for Rhinitis. memantine (NAMENDA) 10 mg tablet 2019 at Unknown time Significant Other No Yes Sig: Take 1 Tab by mouth two (2) times a day. multivitamin (ONE A DAY) tablet 2019 at Unknown time Significant Other Yes Yes Sig: Take 1 Tab by mouth daily. pramoxine HCl/menthol (ANTI-ITCH EX) 5/15/2019 at Unknown time Significant Other Yes Yes Sig: by Apply Externally route daily as needed (itch). Facility-Administered Medications: None Thank you, Sharon Calzada Medication History Pharmacy Technician

## 2019-05-22 NOTE — ED NOTES
Pt arrived to ed for evaluation:  Per wife patient has had a couple of spells recently with noted hypotension and bradycardia. Pt seen by pcp 5/14 dx with bradycardia and given cardiology consult information. Pt found to be in afib on arrival to ed. No peripheral edema noted and breath sounds clear.

## 2019-05-22 NOTE — ED PROVIDER NOTES
EMERGENCY DEPARTMENT HISTORY AND PHYSICAL EXAM 
 
 
Date: 5/22/2019 Patient Name: Kriss Wallis History of Presenting Illness Chief Complaint Patient presents with  Extremity Weakness Bilateral \"Leg freezing\" Per wife, patient has intermittent spells where he can not move his feet and appears to be unsteady on his feet.  Irregular Heart Beat Irregular heart rate for a few days. Patient's wife has been monitoring HR and states it will range anywhere between 100-30s. Has an appt set for Dr. Boni Lowe on Mayte 10. Patient denies chest pain and SOB History Provided By: Patient and Patient's Wife HPI: Kriss Wallis, 80 y.o. male  presents to the ED with cc of leg weakness. Patient has been having intermittent spells where he will have acute weakness in his legs and will have to grab onto the wall. He states that the time that he cannot walk. They have been getting more frequent nature. He does have a history of dementia cannot provide much further history. His wife states he is not complained of any chest pain or shortness of breath. He has not had any nausea vomiting or diarrhea. EKG shows atrial fibrillation today and she states he has not had any history of this. She has noticed that his heart rate is very variable. She has recorded using a blood pressure cuff heart rate as low as in the 30s but is also seen heart rate between 100-130. She has not recorded any episodes of hypotension. There are no other complaints, changes, or physical findings at this time. PCP: Joseph Garcia MD 
 
No current facility-administered medications on file prior to encounter. Current Outpatient Medications on File Prior to Encounter Medication Sig Dispense Refill  fluticasone propionate (FLONASE) 50 mcg/actuation nasal spray 2 Sprays by Both Nostrils route daily as needed for Rhinitis.  OTHER Take 1 Tab by mouth daily. Prevagen  pramoxine HCl/menthol (ANTI-ITCH EX) by Apply Externally route daily as needed (itch).  carboxymethylcellulose sodium (REFRESH TEARS OP) Administer 1-2 Drops to both eyes two (2) times a day.  acetaminophen/chlorpheniramine (CORICIDIN PO) Take 1 Tab by mouth daily.  ascorbic acid/multivit-min (EMERGEN-C PO) Take 1 Packet by mouth daily as needed (immune boost).  memantine (NAMENDA) 10 mg tablet Take 1 Tab by mouth two (2) times a day. 180 Tab 3  
 multivitamin (ONE A DAY) tablet Take 1 Tab by mouth daily.  aspirin delayed-release 81 mg tablet Take 81 mg by mouth nightly. Past History Past Medical History: 
Past Medical History:  
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 
Past Surgical History:  
Procedure Laterality Date  HX CAROTID ENDARTERECTOMY Rt.  
 HX CATARACT REMOVAL Bilateral 09/2017  HX HERNIA REPAIR  6/1/1985  AR COLONOSCOPY FLX DX W/COLLJ SPEC WHEN PFRMD  6/30/2010 Family History: 
Family History Problem Relation Age of Onset  Stroke Mother  Heart Disease Mother  Heart Disease Father Social History: 
Social History Tobacco Use  Smoking status: Never Smoker  Smokeless tobacco: Never Used Substance Use Topics  Alcohol use: No  
  Alcohol/week: 0.0 oz  Drug use: No  
 
 
Allergies: Allergies Allergen Reactions  Pcn [Penicillins] Hives Pt says not allergic  Beef Containing Products Rash Review of Systems Review of Systems Unable to perform ROS: Dementia Physical Exam  
Physical Exam  
Constitutional: He appears well-developed and well-nourished. He does not appear ill. No distress.   
HENT:  
Mouth/Throat: Oropharynx is clear and moist.  
Eyes: Conjunctivae are normal.  
 Neck: Neck supple. Cardiovascular: Normal rate. An irregularly irregular rhythm present. Pulmonary/Chest: Effort normal and breath sounds normal. No accessory muscle usage. No respiratory distress. Abdominal: Soft. He exhibits no distension. There is no tenderness. Lymphadenopathy:  
  He has no cervical adenopathy. Neurological: He is alert. He has normal strength. No cranial nerve deficit or sensory deficit. Skin: Skin is warm and dry. Nursing note and vitals reviewed. Diagnostic Study Results Labs - Recent Results (from the past 24 hour(s)) EKG, 12 LEAD, INITIAL Collection Time: 05/22/19  1:02 PM  
Result Value Ref Range Ventricular Rate 75 BPM  
 Atrial Rate 357 BPM  
 QRS Duration 138 ms Q-T Interval 410 ms QTC Calculation (Bezet) 457 ms Calculated R Axis 90 degrees Calculated T Axis 73 degrees Diagnosis Atrial fibrillation with premature ventricular or aberrantly conducted  
complexes Right bundle branch block When compared with ECG of 17-SEP-2017 10:24, Atrial fibrillation has replaced Sinus rhythm CBC WITH AUTOMATED DIFF Collection Time: 05/22/19  1:11 PM  
Result Value Ref Range WBC 8.7 4.1 - 11.1 K/uL  
 RBC 4.90 4. 10 - 5.70 M/uL  
 HGB 15.3 12.1 - 17.0 g/dL HCT 45.2 36.6 - 50.3 % MCV 92.2 80.0 - 99.0 FL  
 MCH 31.2 26.0 - 34.0 PG  
 MCHC 33.8 30.0 - 36.5 g/dL  
 RDW 12.8 11.5 - 14.5 % PLATELET 216 762 - 061 K/uL MPV 9.0 8.9 - 12.9 FL  
 NRBC 0.0 0  WBC ABSOLUTE NRBC 0.00 0.00 - 0.01 K/uL NEUTROPHILS 64 32 - 75 % LYMPHOCYTES 22 12 - 49 % MONOCYTES 12 5 - 13 % EOSINOPHILS 2 0 - 7 % BASOPHILS 0 0 - 1 % IMMATURE GRANULOCYTES 0 0.0 - 0.5 % ABS. NEUTROPHILS 5.6 1.8 - 8.0 K/UL  
 ABS. LYMPHOCYTES 1.9 0.8 - 3.5 K/UL  
 ABS. MONOCYTES 1.0 0.0 - 1.0 K/UL  
 ABS. EOSINOPHILS 0.2 0.0 - 0.4 K/UL  
 ABS. BASOPHILS 0.0 0.0 - 0.1 K/UL  
 ABS. IMM. GRANS. 0.0 0.00 - 0.04 K/UL  
 DF AUTOMATED METABOLIC PANEL, COMPREHENSIVE Collection Time: 05/22/19  1:11 PM  
Result Value Ref Range Sodium 133 (L) 136 - 145 mmol/L Potassium 4.1 3.5 - 5.1 mmol/L Chloride 98 97 - 108 mmol/L  
 CO2 32 21 - 32 mmol/L Anion gap 3 (L) 5 - 15 mmol/L Glucose 80 65 - 100 mg/dL BUN 13 6 - 20 MG/DL Creatinine 0.87 0.70 - 1.30 MG/DL  
 BUN/Creatinine ratio 15 12 - 20 GFR est AA >60 >60 ml/min/1.73m2 GFR est non-AA >60 >60 ml/min/1.73m2 Calcium 8.7 8.5 - 10.1 MG/DL Bilirubin, total 0.4 0.2 - 1.0 MG/DL  
 ALT (SGPT) 29 12 - 78 U/L  
 AST (SGOT) 37 15 - 37 U/L Alk. phosphatase 99 45 - 117 U/L Protein, total 7.6 6.4 - 8.2 g/dL Albumin 3.5 3.5 - 5.0 g/dL Globulin 4.1 (H) 2.0 - 4.0 g/dL A-G Ratio 0.9 (L) 1.1 - 2.2 CK W/ REFLX CKMB Collection Time: 05/22/19  1:11 PM  
Result Value Ref Range CK 77 39 - 308 U/L  
TROPONIN I Collection Time: 05/22/19  1:11 PM  
Result Value Ref Range Troponin-I, Qt. 0.10 (H) <0.05 ng/mL Radiologic Studies -  
XR CHEST PA LAT Final Result Right middle lobe mild atelectasis. CT HEAD WO CONT Final Result No acute intracranial hemorrhage, mass or infarct. CT Results  (Last 48 hours) 05/22/19 1449  CT HEAD WO CONT Final result Impression:  No acute intracranial hemorrhage, mass or infarct. Narrative:  INDICATION: Stroke. Irregular heartbeat. Bilateral lower extremity weakness. Exam: Noncontrast CT of the brain is performed with 5 mm collimation. CT dose reduction was achieved with the use of the standardized protocol  
tailored for this examination and automatic exposure control for dose  
modulation. FINDINGS: There is age-appropriate diffuse cortical atrophy. There is no acute  
intracranial hemorrhage, mass, mass effect or herniation. Ventricular system is  
normal. The gray-white matter differentiation is well-preserved. The mastoid air cells are well pneumatized. There is no air-fluid level within the visualized  
left maxillary sinus. There is partial opacification of the ethmoid air cells. CXR Results  (Last 48 hours) 05/22/19 1450  XR CHEST PA LAT Final result Impression:  Right middle lobe mild atelectasis. Narrative: Indication:  Palpitations, Irregular heart rate. Exam: PA and lateral views of the chest.  
   
Direct comparison is made to prior CXR dated 9/2017. Findings: Cardiomediastinal silhouette is within normal limits. There is mild  
atelectasis within the right middle lobe. The left lung is clear. No pleural  
fluid is seen. There is no pneumothorax. The osseous structures are diffusely  
demineralized, but intact. Medical Decision Making I am the first provider for this patient. I reviewed the vital signs, available nursing notes, past medical history, past surgical history, family history and social history. Vital Signs-Reviewed the patient's vital signs. Patient Vitals for the past 24 hrs: 
 Temp Pulse Resp BP SpO2  
05/22/19 1646  69 19  97 % 05/22/19 1645  74 19 139/72 90 % 05/22/19 1600  69 19 135/65 97 % 05/22/19 1545  70 20 137/64 96 % 05/22/19 1530  96 24 155/74 90 % 05/22/19 1515  73 24 158/79 97 % 05/22/19 1512  71 16 158/79 98 % 05/22/19 1256 98.1 °F (36.7 °C) 77 12 132/84 99 % Pulse Oximetry Analysis - 99% on RA Cardiac Monitor:  
Rate: 77 bpm 
Rhythm: Atrial Fibrillation EKG interpretation: (Preliminary) Rhythm: atrial fib; and irregular. Rate (approx.): 75; Axis: normal; VA interval: normal; QRS interval: prolonged; ST/T wave: non-specific changes; Other findings: PVCs, right bundle branch block. Records Reviewed: Nursing Notes, Old Medical Records, Previous electrocardiograms, Previous Radiology Studies and Previous Laboratory Studies Provider Notes (Medical Decision Making):  
 This patient presents with episodes of bilateral leg weakness. He was noted to be in atrial flutter on his EKG. He has no history of this. His wife has been monitoring his heart rate at home. She does report episodes of bradycardia and then some episodes of tachycardia. He may be getting bradycardic when he has these episodes. Labs other than his cardiac enzymes are unremarkable. Head CT is normal.  His cardiac troponin is minimally elevated and we will monitor this. Cardiology was consulted and they are recommending observation. They will obtain echocardiogram in the morning. He is currently on an aspirin 81 mg daily. His ChadVasc score is low to intermediate risk. ED Course:  
Initial assessment performed. The patients presenting problems have been discussed, and they are in agreement with the care plan formulated and outlined with them. I have encouraged them to ask questions as they arise throughout their visit. ED Course as of May 22 1720 Wed May 22, 2019  
1654 Spoke with David Lorenzana with cardiology. She has discussed the case with Dr. Scooter Vo. They are going to recommend observation with the hospitalist service. Repeat troponin. We will get an echocardiogram tomorrow.  
 [WM] ED Course User Index [WM] Lc Peña MD  
 
 
Orders Placed This Encounter  XR CHEST PA LAT  CT HEAD WO CONT  CBC WITH AUTOMATED DIFF  
 METABOLIC PANEL, COMPREHENSIVE  
 CK W/ REFLX CKMB  TROPONIN I (Utilize POC if available)  TROPONIN I  
 DIET CARDIAC Regular  CHEST PAIN PANEL TRACKING (DO NOT DESELECT)  CARDIAC MONITOR - ED ONLY [address] Per Protocol  MEASURE HEIGHT  WEIGH PATIENT  
 NOTIFY PROVIDER: SPECIFY Notify provider on pt's arrival to floor ONE TIME STAT  
 OUT OF BED WITH ASSISTANCE  
[id number] [address]  Cardiac Monitor  FULL CODE  EKG, 12 LEAD, INITIAL  
 SALINE LOCK IV ONE TIME STAT  
 INSERT PERIPHERAL IV ONE TIME STAT  fluticasone propionate (FLONASE) 50 mcg/actuation nasal spray  OTHER  pramoxine HCl/menthol (ANTI-ITCH EX)  carboxymethylcellulose sodium (REFRESH TEARS OP)  acetaminophen/chlorpheniramine (CORICIDIN PO)  ascorbic acid/multivit-min (EMERGEN-C PO)  IP CONSULT TO HOSPITALIST  IP CONSULT TO CARDIOLOGY  INITIAL PHYSICIAN ORDER: INPATIENT Telemetry; 3. Patient receiving treatment that can only be provided in an inpatient setting (further clarification in H&P documentation) Critical Care Time:  
0 Disposition: 
Admit PLAN: 
1. Current Discharge Medication List  
  
 
2. Follow-up Information None Return to ED if worse Diagnosis Clinical Impression: 1. Atrial flutter, paroxysmal ([address] 2. Weakness of both lower extremities This note will not be viewable in [address]

## 2019-05-22 NOTE — H&P
Hospitalist Admission NoteNAME: Man Trevino :  1934 MRN:  [id number] Date/Time:  2019 5:30 PM 
 
Patient PCP: Julien Lenz MD 
_____________________________________________________________________ Given the patient's current clinical presentation, I have a high level of concern for decompensation if discharged from the emergency department. Complex decision making was performed, which includes reviewing the patient's available past medical records, laboratory results, and x-ray films. My assessment of this patient's clinical condition and my plan of care is as follows. Assessment / Plan: 
 
Atrial fib/flutter, unclear duration Mild troponin elevation 
-wife reports intermittent episodes of bradycardia in the 30-40s. SSS? 
-check TSH 
-telemetry monitoring 
-Echocardiogram 
-cycle troponins 
-lipid profile in AM 
-continue ASA -consult cardiology. He has an appointment with Dr Maria E Mckeon for .  
 
LE weakness / difficulty ambulating  
-CT head negative. Exam non focal with no rigidity or tremors 
-probably some deconditioning. Consult PT OT in AM 
 
Dementia 
-mood stable. Continue namenda S/P CEA 
-continue ASA Recent URI 
-CXR with RML atelectasis. -cough has resolved. Monitor Code Status:  Full Surrogate Decision Maker:  wife DVT Prophylaxis:   Heparin SQ 
GI Prophylaxis: not indicated Baseline:   . Lives at home and wife is primary caregiver. Ambulates independently Subjective: CHIEF COMPLAINT:   Difficulty walking and low HR 
 
HISTORY OF PRESENT ILLNESS:    
Gerardo Ward is a 80 y.o.  male with a history of dementia who presents to the ER with his wife because of concerns about leg weakness. Patient has been having intermittent spells where he will have acute weakness in his legs, stop walking and will have to grab onto some furniture or the wall.   Wife feels that this has been getting more frequent lately. She has been checking the patient's BP and and noted that his HR would be variable and sometimes as low as 30 and as high as 130. No CP, SOB or dizziness. No LOC or syncope. Patient saw evaluated for this by his PCP on 5/14 and wife requested for a cardiology referral which was set for June 1st with Dr Alonzo Burt. Due to the persistent issues with his walking and her concern that the patient needed to be seen sooner, she brought the patient to the ER. Work up demonstrated Afib and troponin elevation. Cardiology recommended admission for work up. We were asked to admit for work up and evaluation of the above problems. Past Medical History:  
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  
Procedure Laterality Date  HX CAROTID ENDARTERECTOMY Rt.  
 HX CATARACT REMOVAL Bilateral 09/2017  HX HERNIA REPAIR  6/1/1985  [place] COLONOSCOPY FLX DX W/COLLJ SPEC WHEN PFRMD  6/30/2010 Social History Tobacco Use  Smoking status: Never Smoker  Smokeless tobacco: Never Used Substance Use Topics  Alcohol use: No  
  Alcohol/week: 0.0 oz Family History Problem Relation Age of Onset  Stroke Mother  Heart Disease Mother  Heart Disease Father Allergies Allergen Reactions  Pcn [Penicillins] Hives Pt says not allergic  Beef Containing Products Rash Prior to Admission medications Medication Sig Start Date End Date Taking? Authorizing Provider  
fluticasone propionate (FLONASE) 50 mcg/actuation nasal spray 2 Sprays by Both Nostrils route daily as needed for Rhinitis. Yes Provider, Historical  
OTHER Take 1 Tab by mouth daily.  Prevagen   Yes Provider, Historical  
pramoxine HCl/menthol (ANTI-ITCH EX) by Apply Externally route daily as needed (itch). Yes Provider, Historical  
carboxymethylcellulose sodium (REFRESH TEARS OP) Administer 1-2 Drops to both eyes two (2) times a day. Yes Provider, Historical  
acetaminophen/chlorpheniramine (CORICIDIN PO) Take 1 Tab by mouth daily. Yes Provider, Historical  
ascorbic acid/multivit-min (EMERGEN-C PO) Take 1 Packet by mouth daily as needed (immune boost). Yes Provider, Historical  
memantine (NAMENDA) 10 mg tablet Take 1 Tab by mouth two (2) times a day. 4/25/19  Yes Lebron Davila MD  
multivitamin (ONE A DAY) tablet Take 1 Tab by mouth daily. Yes Provider, Historical  
aspirin delayed-release 81 mg tablet Take 81 mg by mouth nightly. Yes Other, MD Gideon  
 
 
REVIEW OF SYSTEMS:    
I am not able to complete the review of systems because: The patient is intubated and sedated The patient has altered mental status due to his acute medical problems The patient has baseline aphasia from prior stroke(s)  
x The patient has baseline dementia and is not reliable historian The patient is in acute medical distress and unable to provide information Objective: VITALS:   
Visit Vitals /72 Pulse 69 Temp 98.1 °F (36.7 °C) Resp 19 Ht 5' 8\" (1.727 m) Wt 65.6 kg (144 lb 10 oz) SpO2 97% BMI 21.99 kg/m² PHYSICAL EXAM: 
 
General:    Alert, cooperative, no distress, appears stated age. HEENT: Atraumatic, anicteric sclerae, pink conjunctivae No oral ulcers, mucosa moist, throat clear, dentition fair Neck:  Supple, symmetrical,  thyroid: non tender Lungs:   Clear to auscultation bilaterally. No Wheezing or Rhonchi. No rales. Chest wall:  No tenderness  No Accessory muscle use. Heart:   Irregular  rhythm,  No  murmur   No edema Abdomen:   Soft, non-tender. Not distended. Bowel sounds normal 
Extremities: No cyanosis. No clubbing,  Skin turgor normal, Capillary refill normal, Radial dial pulse 2+ Skin:     Not pale. Not Jaundiced  No rashes Psych:  Poor insight. Not depressed. Not anxious or agitated. Neurologic: EOMs intact. No facial asymmetry. No aphasia or slurred speech. Symmetrical strength, Sensation grossly intact. Alert and oriented X person. Not oriented to place year or situation. _______________________________________________________________________ Care Plan discussed with: 
  Comments Patient x Family  x  wife RN Care Manager Consultant:     
_______________________________________________________________________ Expected  Disposition:  
Home with Family x HH/PT/OT/RN x  
SNF/LTC   
DONN   
________________________________________________________________________ TOTAL TIME:  45   Minutes Critical Care Provided     Minutes non procedure based Comments  
 x Reviewed previous records  
>50% of visit spent in counseling and coordination of care  Discussion with patient and/or family and questions answered Given the patient's current clinical presentation, I have a high level of concern for decompensation if discharged from the ED. Complex decision making was performed which includes reviewing the patient's available past medical records, laboratory results, and Xray films. I have also directly communicated my plan and discussed this case with the involved ED physician.  
 
____________________________________________________________________ Nishi Reilly MD 
 
Procedures: see electronic medical records for all procedures/Xrays and details which were not copied into this note but were reviewed prior to creation of Plan. LAB DATA REVIEWED:   
Recent Results (from the past 24 hour(s)) EKG, 12 LEAD, INITIAL Collection Time: 05/22/19  1:02 PM  
Result Value Ref Range Ventricular Rate 75 BPM  
 Atrial Rate 357 BPM  
 QRS Duration 138 ms Q-T Interval 410 ms QTC Calculation (Bezet) 457 ms Calculated R Axis 90 degrees Calculated T Axis 73 degrees Diagnosis Atrial fibrillation with premature ventricular or aberrantly conducted  
complexes Right bundle branch block When compared with ECG of 17-SEP-2017 10:24, Atrial fibrillation has replaced Sinus rhythm CBC WITH AUTOMATED DIFF Collection Time: 05/22/19  1:11 PM  
Result Value Ref Range WBC 8.7 4.1 - 11.1 K/uL  
 RBC 4.90 4. 10 - 5.70 M/uL  
 HGB 15.3 12.1 - 17.0 g/dL HCT 45.2 36.6 - 50.3 % MCV 92.2 80.0 - 99.0 FL  
 MCH 31.2 26.0 - 34.0 PG  
 MCHC 33.8 30.0 - 36.5 g/dL  
 RDW 12.8 11.5 - 14.5 % PLATELET 758 249 - 944 K/uL MPV 9.0 8.9 - 12.9 FL  
 NRBC 0.0 0  WBC ABSOLUTE NRBC 0.00 0.00 - 0.01 K/uL NEUTROPHILS 64 32 - 75 % LYMPHOCYTES 22 12 - 49 % MONOCYTES 12 5 - 13 % EOSINOPHILS 2 0 - 7 % BASOPHILS 0 0 - 1 % IMMATURE GRANULOCYTES 0 0.0 - 0.5 % ABS. NEUTROPHILS 5.6 1.8 - 8.0 K/UL  
 ABS. LYMPHOCYTES 1.9 0.8 - 3.5 K/UL  
 ABS. MONOCYTES 1.0 0.0 - 1.0 K/UL  
 ABS. EOSINOPHILS 0.2 0.0 - 0.4 K/UL  
 ABS. BASOPHILS 0.0 0.0 - 0.1 K/UL  
 ABS. IMM. GRANS. 0.0 0.00 - 0.04 K/UL  
 DF AUTOMATED METABOLIC PANEL, COMPREHENSIVE Collection Time: 05/22/19  1:11 PM  
Result Value Ref Range Sodium 133 (L) 136 - 145 mmol/L Potassium 4.1 3.5 - 5.1 mmol/L Chloride 98 97 - 108 mmol/L  
 CO2 32 21 - 32 mmol/L Anion gap 3 (L) 5 - 15 mmol/L Glucose 80 65 - 100 mg/dL BUN 13 6 - 20 MG/DL Creatinine 0.87 0.70 - 1.30 MG/DL  
 BUN/Creatinine ratio 15 12 - 20 GFR est AA >60 >60 ml/min/1.73m2 GFR est non-AA >60 >60 ml/min/1.73m2 Calcium 8.7 8.5 - 10.1 MG/DL Bilirubin, total 0.4 0.2 - 1.0 MG/DL  
 ALT (SGPT) 29 12 - 78 U/L  
 AST (SGOT) 37 15 - 37 U/L Alk. phosphatase 99 45 - 117 U/L Protein, total 7.6 6.4 - 8.2 g/dL Albumin 3.5 3.5 - 5.0 g/dL Globulin 4.1 (H) 2.0 - 4.0 g/dL A-G Ratio 0.9 (L) 1.1 - 2.2 CK W/ REFLX CKMB Collection Time: 05/22/19  1:11 PM  
Result Value Ref Range  CK 77 39 - 308 U/L  
TROPONIN I  
 Collection Time: 05/22/19  1:11 PM  
Result Value Ref Range Troponin-I, Qt. 0.10 (H) <0.05 ng/mL TROPONIN I Collection Time: 05/22/19  4:43 PM  
Result Value Ref Range Troponin-I, Qt. 0.10 (H) <0.05 ng/mL

## 2019-05-23 ENCOUNTER — APPOINTMENT (OUTPATIENT)
Dept: NON INVASIVE DIAGNOSTICS | Age: 84
DRG: 309 | End: 2019-05-23
Attending: NURSE PRACTITIONER
Payer: MEDICARE

## 2019-05-23 VITALS
TEMPERATURE: 97.3 F | RESPIRATION RATE: 18 BRPM | DIASTOLIC BLOOD PRESSURE: 80 MMHG | OXYGEN SATURATION: 97 % | HEART RATE: 73 BPM | HEIGHT: 68 IN | BODY MASS INDEX: 21.48 KG/M2 | SYSTOLIC BLOOD PRESSURE: 179 MMHG | WEIGHT: 141.76 LBS

## 2019-05-23 PROBLEM — I48.91 A-FIB (HCC): Status: RESOLVED | Noted: 2019-05-22 | Resolved: 2019-05-23

## 2019-05-23 PROBLEM — I48.91 NEW ONSET A-FIB (HCC): Status: RESOLVED | Noted: 2019-05-22 | Resolved: 2019-05-23

## 2019-05-23 LAB
ANION GAP SERPL CALC-SCNC: 6 MMOL/L (ref 5–15)
BUN SERPL-MCNC: 13 MG/DL (ref 6–20)
BUN/CREAT SERPL: 15 (ref 12–20)
CALCIUM SERPL-MCNC: 9 MG/DL (ref 8.5–10.1)
CHLORIDE SERPL-SCNC: 101 MMOL/L (ref 97–108)
CHOLEST SERPL-MCNC: 181 MG/DL
CO2 SERPL-SCNC: 27 MMOL/L (ref 21–32)
CREAT SERPL-MCNC: 0.85 MG/DL (ref 0.7–1.3)
ECHO AO ROOT DIAM: 3.94 CM
ECHO AV AREA PEAK VELOCITY: 2 CM2
ECHO AV AREA/BSA PEAK VELOCITY: 1.1 CM2/M2
ECHO AV CUSP MM: 0 CM
ECHO AV PEAK GRADIENT: 12.6 MMHG
ECHO AV PEAK VELOCITY: 177.37 CM/S
ECHO EST RA PRESSURE: 10 MMHG
ECHO LA AREA 4C: 10.2 CM2
ECHO LA MAJOR AXIS: 3.43 CM
ECHO LA TO AORTIC ROOT RATIO: 0.87
ECHO LA VOL 4C: 19.55 ML (ref 18–58)
ECHO LA VOLUME INDEX A4C: 11.07 ML/M2 (ref 16–28)
ECHO LV INTERNAL DIMENSION DIASTOLIC: 2.99 CM (ref 4.2–5.9)
ECHO LV INTERNAL DIMENSION SYSTOLIC: 2.33 CM
ECHO LV IVSD: 0.98 CM (ref 0.6–1)
ECHO LV MASS 2D: 99.2 G (ref 88–224)
ECHO LV MASS INDEX 2D: 56.2 G/M2 (ref 49–115)
ECHO LV POSTERIOR WALL DIASTOLIC: 1.16 CM (ref 0.6–1)
ECHO LV POSTERIOR WALL SYSTOLIC: 1.04 CM
ECHO LVOT DIAM: 2.06 CM
ECHO LVOT PEAK GRADIENT: 4.6 MMHG
ECHO LVOT PEAK VELOCITY: 106.66 CM/S
ECHO LVOT SV: 67.1 ML
ECHO LVOT VTI: 20.11 CM
ECHO MV A VELOCITY: 61.1 CM/S
ECHO MV AREA PHT: 3.8 CM2
ECHO MV AREA VTI: 2.5 CM2
ECHO MV E DECELERATION TIME (DT): 199.9 MS
ECHO MV E VELOCITY: 83.99 CM/S
ECHO MV E/A RATIO: 1.37
ECHO MV MAX VELOCITY: 94.16 CM/S
ECHO MV MEAN GRADIENT: 1.8 MMHG
ECHO MV PEAK GRADIENT: 3.5 MMHG
ECHO MV PRESSURE HALF TIME (PHT): 58 MS
ECHO MV REGURGITANT PEAK GRADIENT: 7 MMHG
ECHO MV REGURGITANT PEAK VELOCITY: 132.19 CM/S
ECHO MV VTI: 27.25 CM
ECHO PULMONARY ARTERY SYSTOLIC PRESSURE (PASP): 34.7 MMHG
ECHO RA VOLUME: 24.7 ML
ECHO RIGHT VENTRICULAR SYSTOLIC PRESSURE (RVSP): 34.7 MMHG
ECHO TV MAX VELOCITY: 214.46 CM/S
ECHO TV PEAK GRADIENT: 18.4 MMHG
ECHO TV REGURGITANT MAX VELOCITY: 248.26 CM/S
ECHO TV REGURGITANT PEAK GRADIENT: 24.7 MMHG
GLUCOSE SERPL-MCNC: 89 MG/DL (ref 65–100)
HDLC SERPL-MCNC: 46 MG/DL
HDLC SERPL: 3.9 {RATIO} (ref 0–5)
LDLC SERPL CALC-MCNC: 118 MG/DL (ref 0–100)
LIPID PROFILE,FLP: ABNORMAL
POTASSIUM SERPL-SCNC: 3.9 MMOL/L (ref 3.5–5.1)
SODIUM SERPL-SCNC: 134 MMOL/L (ref 136–145)
T4 FREE SERPL-MCNC: 1.2 NG/DL (ref 0.8–1.5)
TRIGL SERPL-MCNC: 85 MG/DL (ref ?–150)
TROPONIN I SERPL-MCNC: 0.08 NG/ML
TSH SERPL DL<=0.05 MIU/L-ACNC: 2.71 UIU/ML (ref 0.36–3.74)
VIT B12 SERPL-MCNC: 579 PG/ML (ref 193–986)
VLDLC SERPL CALC-MCNC: 17 MG/DL

## 2019-05-23 PROCEDURE — 80048 BASIC METABOLIC PNL TOTAL CA: CPT

## 2019-05-23 PROCEDURE — 36415 COLL VENOUS BLD VENIPUNCTURE: CPT

## 2019-05-23 PROCEDURE — 97161 PT EVAL LOW COMPLEX 20 MIN: CPT

## 2019-05-23 PROCEDURE — 74011250636 HC RX REV CODE- 250/636: Performed by: INTERNAL MEDICINE

## 2019-05-23 PROCEDURE — 99218 HC RM OBSERVATION: CPT

## 2019-05-23 PROCEDURE — 80061 LIPID PANEL: CPT

## 2019-05-23 PROCEDURE — 97165 OT EVAL LOW COMPLEX 30 MIN: CPT | Performed by: OCCUPATIONAL THERAPIST

## 2019-05-23 PROCEDURE — 97535 SELF CARE MNGMENT TRAINING: CPT | Performed by: OCCUPATIONAL THERAPIST

## 2019-05-23 PROCEDURE — 97116 GAIT TRAINING THERAPY: CPT

## 2019-05-23 PROCEDURE — 93306 TTE W/DOPPLER COMPLETE: CPT

## 2019-05-23 PROCEDURE — 65660000000 HC RM CCU STEPDOWN

## 2019-05-23 PROCEDURE — 82607 VITAMIN B-12: CPT

## 2019-05-23 PROCEDURE — 84443 ASSAY THYROID STIM HORMONE: CPT

## 2019-05-23 PROCEDURE — 74011250637 HC RX REV CODE- 250/637: Performed by: INTERNAL MEDICINE

## 2019-05-23 PROCEDURE — 84439 ASSAY OF FREE THYROXINE: CPT

## 2019-05-23 PROCEDURE — 84484 ASSAY OF TROPONIN QUANT: CPT

## 2019-05-23 RX ORDER — ASPIRIN 81 MG/1
81 TABLET ORAL
Qty: 30 TAB | Refills: 0 | Status: SHIPPED | OUTPATIENT
Start: 2019-05-23 | End: 2019-05-29 | Stop reason: ALTCHOICE

## 2019-05-23 RX ADMIN — HEPARIN SODIUM 5000 UNITS: 5000 INJECTION INTRAVENOUS; SUBCUTANEOUS at 06:19

## 2019-05-23 RX ADMIN — MEMANTINE HYDROCHLORIDE 10 MG: 10 TABLET ORAL at 09:11

## 2019-05-23 RX ADMIN — HEPARIN SODIUM 5000 UNITS: 5000 INJECTION INTRAVENOUS; SUBCUTANEOUS at 15:39

## 2019-05-23 RX ADMIN — DOCUSATE SODIUM 100 MG: 100 CAPSULE, LIQUID FILLED ORAL at 17:06

## 2019-05-23 RX ADMIN — MEMANTINE HYDROCHLORIDE 10 MG: 10 TABLET ORAL at 17:06

## 2019-05-23 RX ADMIN — Medication 10 ML: at 06:20

## 2019-05-23 RX ADMIN — DOCUSATE SODIUM 100 MG: 100 CAPSULE, LIQUID FILLED ORAL at 09:11

## 2019-05-23 NOTE — PROGRESS NOTES
Oral and Written notification given to patient and/or caregiver informing them that they are currently an Outpatient receiving care in our facility. Outpatient services include Observation Services. Fernie Galarza [phone]

## 2019-05-23 NOTE — PROGRESS NOTES
Problem: Mobility Impaired (Adult and Pediatric) Goal: *Acute Goals and Plan of Care (Insert Text) Description Physical Therapy Goals Initiated 5/23/2019 1. Patient will ambulate with independence for 300 feet with the least restrictive device within 7 day(s). 2.  Patient will ascend/descend 10 stairs with 1 handrail(s) with modified independence within 7 day(s). Outcome: Progressing Towards Goal 
 
PHYSICAL THERAPY EVALUATION Patient: Day Bowens (72 y.o. male) Date: 5/23/2019 Primary Diagnosis: New onset a-fib ([address] [I48.91] Leg weakness [R29.898] Leg weakness [R29.898] A-fib ([address] [I48.91] A-fib ([address] [I48.91] Leg weakness [R29.898] Precautions:   Fall ASSESSMENT : 
Based on the objective data described below, the patient presents with baseline dementia, and altered gait. Pt was received in supine and cleared by nursing to mobilize, wife at bedside. He performed all mobility at an overall CGA or independent level. They reported that pt had episodes of LE \"freezing or weakness\". MMT 5/5 bilaterally. He was able to get up and ambulated the entire sanchez with no LOB noted and CGA. He was returned to the room and left sitting up in the chair. Discussed with wife and recommend OPPT. The gait deficits could be from his dementia. Patient will benefit from skilled intervention to address the above impairments. Patient?s rehabilitation potential is considered to be Fair Factors which may influence rehabilitation potential include:  
? None noted ? Mental ability/status ? Medical condition ? Home/family situation and support systems ? Safety awareness 
? Pain tolerance/management 
? Other: PLAN : 
Recommendations and Planned Interventions: 
?           Bed Mobility Training             ? Neuromuscular Re-Education ? Transfer Training                   ? Orthotic/Prosthetic Training ?           Gait Training                         ? Modalities ? Therapeutic Exercises           ? Edema Management/Control ? Therapeutic Activities            ? Patient and Family Training/Education ? Other (comment): Frequency/Duration: Patient will be followed by physical therapy  2 times a week to address goals. Discharge Recommendations: Outpatient Further Equipment Recommendations for Discharge: wife asked about RW, he could benefit for longer distance community ambulation SUBJECTIVE:  
Patient stated ?by leg just go numb. ? pt not really numb OBJECTIVE DATA SUMMARY:  
HISTORY:   
Past Medical History:  
Diagnosis Date  
 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  
Procedure Laterality Date HX CAROTID ENDARTERECTOMY Rt. HX CATARACT REMOVAL Bilateral 09/2017 HX HERNIA REPAIR  6/1/1985 [place] COLONOSCOPY FLX DX W/COLLJ SPEC WHEN PFRMD  6/30/2010 Prior Level of Function/Home Situation: pt lives with wife and has baseline dementia. Pt has had falls but is able to get up on his own. Does not use any AD for mobility. Personal factors and/or comorbidities impacting plan of care: dementia Home Situation Home Environment: Private residence One/Two Story Residence: One story Living Alone: No 
Support Systems: Family member(s) Patient Expects to be Discharged to[de-identified] Private residence Current DME Used/Available at Home: None EXAMINATION/PRESENTATION/DECISION MAKING:  
Critical Behavior: 
Neurologic State: Alert Orientation Level: Oriented to person, Oriented to situation Cognition: Follows commands, Poor safety awareness Hearing: Auditory Auditory Impairment: Hard of hearing, bilateral 
Skin:  intact Edema: none Range Of Motion: AROM: Within functional limits PROM: Within functional limits Strength:   
Strength: Within functional limits Tone & Sensation:  
Tone: Normal 
  
  
  
  
Sensation: Intact Coordination: 
Coordination: Within functional limits Vision:  
  
Functional Mobility: 
Bed Mobility: 
Rolling: Independent Supine to Sit: Independent Scooting: Independent Transfers: 
Sit to Stand: Independent Stand to Sit: Independent Balance:  
Sitting: Intact Standing: Intact Ambulation/Gait Training: 
Distance (ft): 350 Feet (ft) Assistive Device: Gait belt Ambulation - Level of Assistance: Contact guard assistance Gait Abnormalities: Decreased step clearance Functional Measure: 
Tinetti test: 
 
Sitting Balance: 1 Arises: 2 Attempts to Rise: 2 Immediate Standing Balance: 2 Standing Balance: 2 Nudged: 1 Eyes Closed: 1 Turn 360 Degrees - Continuous/Discontinuous: 1 Turn 360 Degrees - Steady/Unsteady: 1 Sitting Down: 2 Balance Score: 15 Indication of Gait: 1 
R Step Length/Height: 1 L Step Length/Height: 1 
R Foot Clearance: 1 L Foot Clearance: 1 Step Symmetry: 1 Step Continuity: 1 Path: 1 Trunk: 2 Walking Time: 1 Gait Score: 11 Total Score: 26 Tinetti Tool Score Risk of Falls 
<19 = High Fall Risk 19-24 = Moderate Fall Risk 25-28 = Low Fall Risk Tinetti ME. Performance-Oriented Assessment of Mobility Problems in Elderly Patients. [place] 66; [id number]. (Scoring Description: PT Bulletin Feb. 10, 1993) Older adults: Lia Almeida et al, 2009; n = 1601 [address] Almeida AgSquared elderly evaluated with ABC, MILDRED, ADL, and IADL) · Mean MILDRED score for males aged 69-68 years = 26.21(3.40) · Mean MILDRED score for females age 69-68 years = 25.16(4.30) · Mean MILDRED score for males over 80 years = 23.29(6.02) · Mean MILDRED score for females over 80 years = 17.20(8.32) Physical Therapy Evaluation Charge Determination History Examination Presentation Decision-Making MEDIUM  Complexity : 1-2 comorbidities / personal factors will impact the outcome/ POC  LOW Complexity : 1-2 Standardized tests and measures addressing body structure, function, activity limitation and / or participation in recreation  LOW Complexity : Stable, uncomplicated  Other outcome measures tinetti  LOW Based on the above components, the patient evaluation is determined to be of the following complexity level: LOW Pain: 
Pain Scale 1: Numeric (0 - 10) Pain Intensity 1: 0 Activity Tolerance: VSS Please refer to the flowsheet for vital signs taken during this treatment. After treatment:  
?         Patient left in no apparent distress sitting up in chair ? Patient left in no apparent distress in bed 
? Call bell left within reach ? Nursing notified ? Caregiver present ? Bed alarm activated COMMUNICATION/EDUCATION:  
The patient?s plan of care was discussed with: Registered Nurse. ?         Fall prevention education was provided and the patient/caregiver indicated understanding. ? Patient/family have participated as able in goal setting and plan of care. ?         Patient/family agree to work toward stated goals and plan of care. ?         Patient understands intent and goals of therapy, but is neutral about his/her participation. ? Patient is unable to participate in goal setting and plan of care. Thank you for this referral. 
Kaya Mccracken, PT, DPT Time Calculation: 19 mins

## 2019-05-23 NOTE — PROGRESS NOTES
Hospitalist Progress Note NAME: Day Bowens :  1934 MRN:  [id number] Assessment / Plan: 
Atrial fib/flutter, unclear duration Mild troponin elevation-stable 
-wife reports intermittent episodes of bradycardia in the 30-40s. SSS? 
- TSH normal 
-telemetry monitoring 
-Echocardiogram 
-lipid profile in AM 
-continue ASA 
-cardiology to follow 
  
LE weakness / difficulty ambulating  
-CT head negative. Exam non focal with no rigidity or tremors 
-probably some deconditioning. PT OT to follow 
  Dementia with some agitation 
-  Continue namenda, prn haldol. 
  
S/P CEA 
-continue ASA 
  
Recent URI 
-CXR with RML atelectasis. -cough has resolved. Monitor 
  
  
Surrogate Decision Maker:  wife 
  
GI Prophylaxis: not indicated 
  
Baseline:   . Lives at home and wife is primary caregiver. Ambulates independently 
  
 
 
18.5 - 24.9 Normal weight / Body mass index is 21.55 kg/m². Code status: Full Prophylaxis: Hep SQ Recommended Disposition: TBD Subjective: Chief Complaint / Reason for Physician Visit Follow up of generalized weakness, afib/aflutter Discussed with RN events overnight. Review of Systems: 
Symptom Y/N Comments  Symptom Y/N Comments Fever/Chills    Chest Pain Poor Appetite    Edema Cough    Abdominal Pain Sputum    Joint Pain SOB/SIMPSON    Pruritis/Rash Nausea/vomit    Tolerating PT/OT Diarrhea    Tolerating Diet Constipation    Other Could NOT obtain due to:   
 
Objective: VITALS:  
Last 24hrs VS reviewed since prior progress note. Most recent are: 
Patient Vitals for the past 24 hrs: 
 Temp Pulse Resp BP SpO2  
19 0730 98.2 °F (36.8 °C) 61 18 135/40 94 % 19 0419 97.9 °F (36.6 °C) (!) 58 18 140/84 98 % 19 2214 97.8 °F (36.6 °C) 71 20 123/75 96 % 19 2107 97.5 °F (36.4 °C) 80 20 162/90 97 % 19 2030  70 24 114/64 95 % 05/22/19 2000 98.2 °F (36.8 °C) 71 16 114/89 96 % 05/22/19 1745  87 17 138/74 91 % 05/22/19 1730  79 22 151/88 95 % 05/22/19 1715  76 17 168/85 92 % 05/22/19 1700  70 22 109/78 95 % 05/22/19 1646  69 19  97 % 05/22/19 1645  74 19 139/72 90 % 05/22/19 1630  76 21 125/74 (!) 85 % 05/22/19 1615  75 23 126/53 94 % 05/22/19 1600  69 19 135/65 97 % 05/22/19 1545  70 20 137/64 96 % 05/22/19 1530  96 24 155/74 90 % 05/22/19 1515  73 24 158/79 97 % 05/22/19 1512  71 16 158/79 98 % 05/22/19 1256 98.1 °F (36.7 °C) 77 12 132/84 99 % Intake/Output Summary (Last 24 hours) at 5/23/2019 8344 Last data filed at 5/23/2019 7359 Gross per 24 hour Intake 200 ml Output 550 ml Net -350 ml PHYSICAL EXAM: 
General: , cooperative, no acute distress   
EENT:  EOMI. Anicteric sclerae. Resp:  CTA bilaterally, no wheezing or rales. No accessory muscle use CV:  Regular  rhythm,  No edema GI:  Soft, Non distended, Non tender.   
Neurologic:  , normal speech, no focal weakness Psych:   . Not anxious nor agitated Skin:  No rashes. No jaundice Reviewed most current lab test results and cultures  YES Reviewed most current radiology test results   YES Review and summation of old records today    NO Reviewed patient's current orders and MAR    YES 
PMH/SH reviewed - no change compared to H&P 
________________________________________________________________________ Care Plan discussed with: 
  Comments Patient y Family  y wife RN Care Manager Consultant Multidiciplinary team rounds were held today with , nursing, pharmacist and clinical coordinator. Patient's plan of care was discussed; medications were reviewed and discharge planning was addressed. ________________________________________________________________________ 
______________________________________________________ Kitty Jerez MD  
 
 Procedures: see electronic medical records for all procedures/Xrays and details which were not copied into this note but were reviewed prior to creation of Plan. LABS: 
I reviewed today's most current labs and imaging studies. Pertinent labs include: 
Recent Labs  
  05/22/19 
1311 WBC 8.7 HGB 15.3 HCT 45.2  Recent Labs  
  05/23/19 
0443 05/22/19 
1311 * 133* K 3.9 4.1  98 CO2 27 32 GLU 89 80 BUN 13 13 CREA 0.85 0.87 CA 9.0 8.7 ALB  --  3.5 TBILI  --  0.4 SGOT  --  37 ALT  --  29 Signed: Adrienne Tate MD

## 2019-05-23 NOTE — PHYSICIAN ADVISORY
Letter of Status Determination:  
Recommend hospitalization status upgraded from OBSERVATION  to INPATIENT  Status Pt Name:  Day Bowens MR#  
FLOR # [id number] / 
[id number] Payor: Lavern Yates / Plan: [address] [employer] [employer] / Product Type: Medicare /   
SIVAKUMAR#  [id number] Room and Hospital  2205/01  @ [facility] Hospitalization date  5/22/2019  1:52 PM  
Current Attending Physician  Julianne Glynn MD  
Principal diagnosis  A [facility]uz [employer] Citlalli Reardon is a 80 y.o.  male with a history of dementia who presents to the ER with his wife because of concerns about leg weakness. Patient has been having intermittent spells where he will have acute weakness in his legs, stop walking and will have to grab onto some furniture or the wall.  Wife feels that this has been getting more frequent lately. She has been checking the patient's BP and and noted that his HR would be variable and sometimes as low as 30 and as high as 130. No CP, SOB or dizziness. No LOC or syncope. Patient saw evaluated for this by his PCP on 5/14 and wife requested for a cardiology referral which was set for June 1st with Dr Darylene Richard. Due to the persistent issues with his walking and her concern that the patient needed to be seen sooner, she brought the patient to the ER. Pt bradycardic, HR falls down to 40's and 50's, ? NSTEMI with elevated troponin and A flutter, needs further work up with ECHO pending,  
 
  
[place] (MCG) criteria Does  NOT apply STATUS DETERMINATION  INPATIENT The final decision of the patient's hospitalization status depends on the attending physician's judgment Additional comments Payor: Lavern Yates / Plan: [address] / Product Type: Medicare /   
  
 
Jenny Gatica MD 
Cell: [phone] Physician Advisor

## 2019-05-23 NOTE — PROGRESS NOTES
Reason for Admission:   New onset A-fib, Leg weakness RRAT Score:     13 Moderate risk Do you (patient/family) have any concerns for transition/discharge? None Plan for utilizing home health:   No 
 
Current Advanced Directive/Advance Care Plan:  Pt is FULL code status. Pt does not have an ACP on file. Likelihood of readmission? High risk Transition of Care Plan:    Home with outpatient physical therapy and DME- Rolling Walker. CM met with pt and pt's wife Kj Bass to discuss d/c plan. Pt was alert. Pt's wife provided information due to pt being confused. CM verified pt's demographics, insurance and PCP. Pt is a 81 y/o  male admitted to [facility] on 5/22/19 for New onset A-fib and Leg weakness. Pt's PCP is Dr. Dee Dee Wooten. Pt sees PCP every 5 months. Pt uses [facility] pharmacy in [address] [place] [address] for Rx. Pt resides with his wife in a one level home with 2 EULA. Pt does not drive. Pt has supportive family to include his wife and transports when necessary. Pt is independent with ADL's and IADL's. Pt has no DME. Pt has not had home health, acute inpatient rehab nor has he been in a skilled nursing facility in the past. Pt is FULL code status. Pt does not have an ACP on file. Pt's wife will transport at d/c. 
 
3:45pm-CM discussed with pt's wife about a rolling walker. Pt's wife stated that pt is in need of a rolling walker. Referral sent to [facility] via [employer]. 4:00pm- [facility] accepted referral for rolling walker. CM met with pt and pt's wife to signed paperwork for rolling walker. Pt was provided copies of paperwork. CM called to schedule f/u appointment with Dr. Lindsey Ross- Appt schedule for 5/28/19 at 2:00pm. 
 
Care Management Interventions PCP Verified by CM: Yes(Pt's PCP is Dr. Dee Dee Wooten. Pt sees PCP every 5 months) Mode of Transport at Discharge: Other (see comment)(Pt's wife will transport at d/c.) Transition of Care Consult (CM Consult): Discharge Planning Discharge Durable Medical Equipment: No(No DME) Physical Therapy Consult: Yes Occupational Therapy Consult: Yes Speech Therapy Consult: No 
Current Support Network: Lives with Spouse(Pt resides with his wife in a one level home with 2 EULA.) Confirm Follow Up Transport: Family(Pt has supportive family to include wife and transports when necessary. ) Plan discussed with Pt/Family/Caregiver: Yes Discharge Location Discharge Placement: Home with outpatient services(Home with outpatient physical therapy. ) Aubry Cockayne, [place] [address] 
[phone]

## 2019-05-23 NOTE — PROGRESS NOTES
Bedside shift change report given to Dick York and Lindsay Gates (oncoming nurse) by Osker Hamman (offgoing nurse). Report included the following information SBAR.  
 
[id number] - SBAR report given to [employer]

## 2019-05-23 NOTE — PROGRESS NOTES
TRANSFER - IN REPORT: 
 
Verbal report received from [facility], RN (name) on Flor Degree  being received from ED(unit) for routine progression of care Report consisted of patients Situation, Background, Assessment and  
Recommendations(SBAR). Information from the following report(s) SBAR, Kardex and Cardiac Rhythm Afib was reviewed with the receiving nurse. Opportunity for questions and clarification was provided. Assessment completed upon patients arrival to unit and care assumed. Primary Nurse Karl Chávez and Rajiv Sesay RN performed a dual skin assessment on this patient No impairment noted Beni score is 21 Bedside shift change report given to Anjelica Duckworth RN (oncoming nurse). Report included the following information SBAR, Kardex and Cardiac Rhythm Afib. flutter. SHIFT SUMMARY: 
Notified tele of pts arrival and verified name Pt had 1.58sec pause noted on tele monitor, currently in afib/flutter, pt is non- symptomatic resting quietly in bed. CONCERNS TO ADDRESS WITH MD: 
 
 
 
[facility] NURSING NOTE Admission Date 5/22/2019 Admission Diagnosis New onset a-fib ([address] [I48.91] Leg weakness [R29.898] Leg weakness [R29.898] A-fib ([address] [I48.91] Consults IP CONSULT TO CARDIOLOGY 
IP CONSULT TO HOSPITALIST Cardiac Monitoring [x] Yes [] No  
  
Purposeful Hourly Rounding [x] Yes   
Cezar Score Total Score: 2 Cezar score 3 or > [x] Bed Alarm [] Avasys [] 1:1 sitter [] Patient refused (Signed refusal form in chart) Beni Score Beni Score: 21 Beni score 14 or < [] PMT consult [] Wound Care consult  
 []  Specialty bed  [] Nutrition consult Influenza Vaccine Received Flu Vaccine for Current Season (usually Sept-March): Yes Oxygen needs? [x] Room air Oxygen @  []1L    []2L    []3L   []4L    []5L   []6L via NC Chronic home O2 use? [] Yes [] No 
Perform O2 challenge test and document in progress note using smartphrase (.Homeoxygen) Last bowel movement Urinary Catheter LDAs Readmission Risk Assessment Tool Score Medium Risk 15 Total Score 3 Has Seen PCP in Last 6 Months (Yes=3, No=0)  
 5 Pt. Coverage (Medicare=5 , Medicaid, or Self-Pay=4) 5 Charlson Comorbidity Score (Age + Comorbid Conditions) Criteria that do not apply:  
 . Living with Significant Other. Assisted Living. LTAC. SNF. or  
Rehab Patient Length of Stay (>5 days = 3) IP Visits Last 12 Months (1-3=4, 4=9, >4=11) Expected Length of Stay - - - Actual Length of Stay 1

## 2019-05-23 NOTE — PHYSICIAN ADVISORY
Short Stay Review Pt Name:  Jessica Pineda MR#  [id number] CSN#   [id number] Room and Hospital  2205/01  @ [facility] Hospitalization date  5/22/2019  1:52 PM 
No discharge date for patient encounter. Current Attending Physician  Dale Sawyer MD  
 
A discharge order has been placed for this episode of hospital care for Mr. Jessica Pineda; since this hospital stay is less than two midnights, I reviewed Mr. Lea Kasper's chart. Mr. Lea Kasper's healthcare insurance/benefit include: 
Payor: [place] MED[place]ARE / Plan: [place] MEDICARE PART A / Product Type: Medicare /  
 
Utilization Review related case summary:  
Age  80 y.o.  
BMI  Body mass index is 21.55 kg/m². PMHx includes  Dementia, Hospital course  Pt was hospitalized for weakness of extremities, reported significant bradycardia who was found to have Afib of unknown duration. Risk of deterioration at the time this patient  was hospitalized  High On the basis of chart review, this patient's hospitalization status     
is appropriate for INPATIENT Elizabeth García MD MPH FACP Cell : [phone] Physician Advisor [phone]  
Utilization Review, Care Management CSN:  [id number] FLOR:   [id number] Admitted on :  5/22/2019 Discharge order

## 2019-05-23 NOTE — ED NOTES
TRANSFER - OUT REPORT: 
 
Verbal report given to Marilu Min RN (name) on Blake Kirby  being transferred to [facility] (unit) for routine progression of care Report consisted of patients Situation, Background, Assessment and  
Recommendations(SBAR). Information from the following report(s) SBAR, Kardex, ED Summary, Intake/Output, MAR, Recent Results and Cardiac Rhythm a fib was reviewed with the receiving nurse. Lines:    
 
Opportunity for questions and clarification was provided. Patient transported with: 
 [employer]

## 2019-05-23 NOTE — PROGRESS NOTES
Patient discharged. Pt wife given follow-up instructions, medications list and prescriptions. Pt wife able to demonstrate understanding of discharge instructions. Pt IV's and Telemonitor removed. Pt left with wife and all personal belongings. Pt escorted off unit via wheelchair by [employer].

## 2019-05-23 NOTE — DISCHARGE SUMMARY
Hospitalist Discharge Summary Patient ID: 
Ahmad Cullen [id number] 
80 y.o. 
1934 5/22/2019 PCP on record: Joya Campbell MD 
 
Admit date: 5/22/2019 Discharge date and time: 5/23/2019 DISCHARGE DIAGNOSIS: 
 
Transient afib Deconditioning Dementia CONSULTATIONS: 
IP CONSULT TO CARDIOLOGY 
IP CONSULT TO HOSPITALIST Excerpted HPI from H&P of Eloy Oates MD: 
 
Corinna Smith is a 80 y.o.  male with a history of dementia who presents to the ER with his wife because of concerns about leg weakness. Patient has been having intermittent spells where he will have acute weakness in his legs, stop walking and will have to grab onto some furniture or the wall.  Wife feels that this has been getting more frequent lately. She has been checking the patient's BP and and noted that his HR would be variable and sometimes as low as 30 and as high as 130. No CP, SOB or dizziness. No LOC or syncope. Patient saw evaluated for this by his PCP on 5/14 and wife requested for a cardiology referral which was set for June 1st with Dr Camryn Rios. Due to the persistent issues with his walking and her concern that the patient needed to be seen sooner, she brought the patient to the ER. Work up demonstrated Afib and troponin elevation. Cardiology recommended admission for work up. We were asked to admit for work up and evaluation of the above  
 
 
______________________________________________________________________ DISCHARGE SUMMARY/HOSPITAL COURSE:  for full details see H&P, daily progress notes, labs, consult notes. Atrial fib/flutter, unclear duration-now in NSR Mild troponin elevation-stable 
 
- TSH normal 
-Echocardiogram unremarkable. 
-continue ASA 
-cardiology cleared him for discharge.  
  
LE weakness / difficulty ambulating  
-CT head negative.   Exam non focal with no rigidity or tremors 
-probably some deconditioning.  Outpatient PT 
  
 Dementia with some agitation -  Continue namenda, prn haldol. 
  
S/P CEA 
-continue ASA 
  
Recent URI 
-CXR with RML atelectasis.   
-cough has resolved.    
  
  
Surrogate Decision Maker:  wife 
 
 
 
_______________________________________________________________________ Patient seen and examined by me on discharge day. Pertinent Findings: 
Gen:    Not in distress Chest: Clear lungs 
 
 
_______________________________________________________________________ DISCHARGE MEDICATIONS:  
Current Discharge Medication List  
  
CONTINUE these medications which have CHANGED Details  
!! aspirin delayed-release 81 mg tablet Take 1 Tab by mouth nightly. Qty: 30 Tab, Refills: 0  
  
 !! - Potential duplicate medications found. Please discuss with provider. CONTINUE these medications which have NOT CHANGED Details  
fluticasone propionate (FLONASE) 50 mcg/actuation nasal spray 2 Sprays by Both Nostrils route daily as needed for Rhinitis. OTHER Take 1 Tab by mouth daily. Prevagen  
  
pramoxine HCl/menthol (ANTI-ITCH EX) by Apply Externally route daily as needed (itch). carboxymethylcellulose sodium (REFRESH TEARS OP) Administer 1-2 Drops to both eyes two (2) times a day. acetaminophen/chlorpheniramine (CORICIDIN PO) Take 1 Tab by mouth daily. ascorbic acid/multivit-min (EMERGEN-C PO) Take 1 Packet by mouth daily as needed (immune boost). memantine (NAMENDA) 10 mg tablet Take 1 Tab by mouth two (2) times a day. Qty: 180 Tab, Refills: 3  
  
multivitamin (ONE A DAY) tablet Take 1 Tab by mouth daily. !! aspirin delayed-release 81 mg tablet Take 81 mg by mouth nightly. !! - Potential duplicate medications found. Please discuss with provider. Patient Follow Up Instructions: Activity: Activity as tolerated Diet: Regular Diet Wound Care: None needed Follow-up with pcp in 1 week. DME: rolling walker Follow-up tests/labs Follow-up Information Follow up With Specialties Details Why Contact Info Joya Campbell MD Family Practice On 5/28/2019 PCP follow-up appointment at 2:00pm [address] [place] [id number] 
[phone] 
  
  
 
________________________________________________________________ Risk of deterioration: Low 
 
Condition at Discharge:  Stable 
__________________________________________________________________ Disposition Home with family, no needs 
 
____________________________________________________________________ Code Status: Full Code 
___________________________________________________________________ Total time in minutes spent coordinating this discharge (includes going over instructions, follow-up, prescriptions, and preparing report for sign off to her PCP) : More than 30 minutes Signed: 
Eloy Oates MD

## 2019-05-23 NOTE — PROGRESS NOTES
Occupational Therapy EVALUATION/discharge Patient: Tricia Mobley (14 y.o. male) Date: 5/23/2019 Primary Diagnosis: New onset a-fib ([address] [I48.91] Leg weakness [R29.898] Leg weakness [R29.898] A-fib ([address] [I48.91] A-fib ([address] [I48.91] Leg weakness [R29.898] Precautions:   Fall ASSESSMENT:  
Based on the objective data described below, the patient presents with dementia (~5 years), and is supported by his wife at home. Pt is independent in self care at baseline and wife supports IADLs. Pt does not use an AD for ambulation at home, nor any DME  He has episodes of sudden freezing and \"crumbling\" due to feeling weak in his BLEs; Wife reports that he recognizes the symptoms coming on and he is able to hold onto something during the weakness episode; she reports that pt does not \"fall\". Pt demonstrated independence for self care and simulated IADLs; pt had no LOB during dynamic standing and the task of picking up object from the floor. Educated pt and wife in basic home safety and fall prevention. Wife welcomed a brochure provided from the alzheimer's association for her reference/support. Pt may benefit from a gentle exercise program at discharge if cleared by his MD.   No acute OT is needed at this time. If  Wife feels appropriate, may benefit from a [address] Evaluation. Further skilled acute occupational therapy is not indicated at this time. Discharge Recommendations: None Further Equipment Recommendations for Discharge: none needed at this time. SUBJECTIVE:  
Patient stated [facility].    (pt loves the water) OBJECTIVE DATA SUMMARY:  
HISTORY:  
Past Medical History:  
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  
Procedure Laterality Date  HX CAROTID ENDARTERECTOMY Rt.  
 HX CATARACT REMOVAL Bilateral 09/2017  HX HERNIA REPAIR  6/1/1985  MI COLONOSCOPY FLX DX W/COLLJ SPEC WHEN PFRMD  6/30/2010 Prior Level of Function/Environment/Context: Pt lives with his supportive wife. Per wife he has had dementia dx for 5 years.  (provided brochure from alzheimers ass)  Pt is independent in self care. Wife reports that she gives pt choices when appropriate and works with him, not against him. Pt has no DME nor adaptive aids--may benefit from grab bars at toilet and shower. Pt is able to perform household chores and mow the lawn using a riding mower. He repeats himself often in conversation. Pt assists his wife at the grocery store, etc.  He is able to make himself breakfast or a snack and is able to be outdoors without assistance. Wife reports that pt functions well in his usual home routine. Occupations in which the patient is/was successful, what are the barriers preventing that success: medical condition--weakness/freezing in BLEs-decreased safety. Performance Patterns (routines, roles, habits, and rituals):  
Personal Interests and/or values:  Likes the water/outdoors Expanded or extensive additional review of patient history: cardiac history, Home Situation Home Environment: Private residence One/Two Story Residence: One story Living Alone: No 
Support Systems: Family member(s) Patient Expects to be Discharged to[de-identified] Private residence Current DME Used/Available at Home: None Tub or Shower Type: Shower Hand dominance: Right EXAMINATION OF PERFORMANCE DEFICITS: 
Cognitive/Behavioral Status: 
Neurologic State: Alert Orientation Level: Oriented to person;Oriented to situation Cognition: Follows commands;Poor safety awareness Skin: generally intact, appears fragile Edema: none observed Hearing: Auditory Auditory Impairment: Hard of hearing, bilateral 
Vision/Perceptual: Acuity: (not formally tested) Corrective Lenses: (none) Range of Motion: BUEs:   
AROM: Within functional limits PROM: Within functional limits Strength: BUEs:   
Strength: Within functional limits Coordination: 
Coordination: Within functional limits Fine Motor Skills-Upper: Left Intact; Right Intact Gross Motor Skills-Upper: Left Intact; Right Intact Tone & Sensation: 
 
Tone: Normal 
Sensation: Intact Balance: 
Sitting: Intact Standing: Intact Functional Mobility and Transfers for ADLs:Bed Mobility: 
Rolling: (pt was seated upon arrival) Supine to Sit: Independent Scooting: Independent Transfers: 
Sit to Stand: Independent Stand to Sit: Independent Bathroom Mobility: Independent(supervision for safety--at home is independent in ambulation) Toilet Transfer : Independent ADL Assessment: 
Feeding: Independent Oral Facial Hygiene/Grooming: Setup Bathing: Stand-by assistance;Setup Upper Body Dressing: Independent Lower Body Dressing: Independent Toileting: Independent ADL Intervention and task modifications: Pt performed ambulation to the bathroom for toilet transfer and standing grooming with independence (needs S due to unfamiliar environment). Simulated IADLs  And dynamic balance challenges by reaching into closet, carrying items, picking up item from floor, reaching into drawers, etc.  Pt demonstrated no LOB nor did he exhibit the \"crumbling\" that wife described earlier. Pt is not able to report his PLOF and pt's wife provided all information. Pt's wife was provided a brochure from the alzheimer's association for her reference and support. Therapeutic Exercise: 
encourageed a gentle exercises program if MD approved. Encouraged that pt perform seated BLE and BUE gentle exercises Functional Measure: 
Barthel Index: 
 
Bathin Bladder: 10 Bowels: 10 
 Groomin Dressing: 10 Feeding: 10 Mobility: 10 Stairs: 5 Toilet Use: 10 Transfer (Bed to Chair and Back): 10 Total: 80/100 Percentage of impairment  
0% 1-19% 20-39% 40-59% 60-79% 80-99% 100% Barthel Score 0-100 100 99-80 79-60 59-40 20-39 1-19 
 0 The Barthel ADL Index: Guidelines 1. The index should be used as a record of what a patient does, not as a record of what a patient could do. 2. The main aim is to establish degree of independence from any help, physical or verbal, however minor and for whatever reason. 3. The need for supervision renders the patient not independent. 4. A patient's performance should be established using the best available evidence. Asking the patient, friends/relatives and nurses are the usual sources, but direct observation and common sense are also important. However direct testing is not needed. 5. Usually the patient's performance over the preceding 24-48 hours is important, but occasionally longer periods will be relevant. 6. Middle categories imply that the patient supplies over 50 per cent of the effort. 7. Use of aids to be independent is allowed. Rachael Lew., Barthel, DGERALD. (0090). Functional evaluation: the Barthel Index. [facility] (14)2. BERTHA Solis, Brittani Gregory., Milvia Redmond., [place], [address] [place] [address] (). Measuring the change indisability after inpatient rehabilitation; comparison of the responsiveness of the Barthel Index and Functional [place] Measure. Journal of Neurology, Neurosurgery, and Psychiatry, 66(4), 377-216. Camacho Arboleda, N.J.A, DUTCH Centeno, & Valentine Rodrigez, M.A. (2004.) Assessment of post-stroke quality of life in cost-effectiveness studies: The usefulness of the Barthel Index and the EuroQoL-5D. [facility], 13, [phone] Occupational Therapy Evaluation Charge Determination History Examination Decision-Making LOW Complexity : Brief history review  MEDIUM Complexity : 3-5 performance deficits relating to physical, cognitive , or psychosocial skils that result in activity limitations and / or participation restrictions MEDIUM Complexity : Patient may present with comorbidities that affect occupational performnce. Miniml to moderate modification of tasks or assistance (eg, physical or verbal ) with assesment(s) is necessary to enable patient to complete evaluation Based on the above components, the patient evaluation is determined to be of the following complexity level: LOW Pain: 
Pain Scale 1: Numeric (0 - 10) Pain Intensity 1: 0 Activity Tolerance:  
Good. No complaints After treatment:  
[x]  Patient left in no apparent distress sitting up in chair 
[]  Patient left in no apparent distress in bed 
[x]  Call bell left within reach [x]  Nursing notified 
[x]  Caregiver present [x]  Bed alarm activated COMMUNICATION/EDUCATION:  
Communication/Collaboration: 
[x]      Home safety education was provided and the caregiver indicated understanding. [x]      Patient/family have participated as able and agree with findings and recommendations. []      Patient is unable to participate in plan of care at this time. Findings and recommendations were discussed with: Registered Nurse Janice Nelson OTR/L Time Calculation: 27 mins

## 2019-05-23 NOTE — PROGRESS NOTES
Orders received, chart reviewed and patient evaluated by physical therapy. Recommend patient to discharge to home with OPPT pending progress with skilled acute care physical therapy. Recommend with nursing patient to complete as able in order to maintain strength, endurance and independence: OOB to chair 3x/day and ambulating with 1 person. Thank you for your assistance. Full evaluation to follow.

## 2019-05-23 NOTE — CONSULTS
Subjective:  
  
Date of  Admission: 5/22/2019  1:52 PM  
 
Admission type:Emergency Russell Green is a 80 y.o. male admitted came to ER with c/o leg weakness and noted to be in a. Fib with controlled. Pt himself has dementia and unable to provide any meaningful history. Per wife no history of chest pain, chest pressure/discomfort, dyspnea, palpitations, irregular heart beats, near-syncope, syncope, fatigue, orthopnea, paroxysmal nocturnal dyspnea, exertional chest pressure/discomfort, claudication, lower extremity edema, tachypnea. Periodic LE weakness. Patient Active Problem List  
 Diagnosis Date Noted  New onset a-fib ([facility] 05/22/2019  Leg weakness 05/22/2019  A-fib ([facility] 05/22/2019  Dementia of the Alzheimer's type, with late onset, uncomplicated 00/47/9500  Abnormal EKG 09/18/2017  Alzheimer's disease 10/26/2015  Allergic rhinitis 11/13/2014  Stenosis of carotid artery 10/18/2013  BPH (benign prostatic hyperplasia) 08/16/2011 Carlos Manuel Joel MD 
Past Medical History:  
Diagnosis Date  BPH (benign prostatic hypertrophy)  Cancer ([facility] BASAL ON FACE  Dementia  ECG abnormal 04/2011 IVCD, RBBB  Hypercholesterolemia  S/P carotid endarterectomy Right Past Surgical History:  
Procedure Laterality Date  HX CAROTID ENDARTERECTOMY Rt.  
 HX CATARACT REMOVAL Bilateral 09/2017  HX HERNIA REPAIR  6/1/1985  MA COLONOSCOPY FLX DX W/COLLJ SPEC WHEN PFRMD  6/30/2010 Allergies Allergen Reactions  Pcn [Penicillins] Hives Pt says not allergic  Beef Containing Products Rash Family History Problem Relation Age of Onset  Stroke Mother  Heart Disease Mother  Heart Disease Father Current Facility-Administered Medications Medication Dose Route Frequency  aspirin delayed-release tablet 81 mg  81 mg Oral QHS  memantine (NAMENDA) tablet 10 mg  10 mg Oral BID  
  sodium chloride (NS) flush 5-40 mL  5-40 mL IntraVENous Q8H  
 sodium chloride (NS) flush 5-40 mL  5-40 mL IntraVENous PRN  
 acetaminophen (TYLENOL) tablet 650 mg  650 mg Oral Q4H PRN  
 ondansetron (ZOFRAN) injection 4 mg  4 mg IntraVENous Q4H PRN  
 docusate sodium (COLACE) capsule 100 mg  100 mg Oral BID  heparin (porcine) injection 5,000 Units  5,000 Units SubCUTAneous Q8H  
 haloperidol lactate (HALDOL) injection 5 mg  5 mg IntraMUSCular Q6H PRN Review of Symptoms: 
Constitutional: negative Eyes: negative Ears, nose, mouth, throat, and face: negative Respiratory: No exertional dyspnea, orthopnea, PND, cough, hemoptysis, URI. Cardiovascular: No CP, palpitations, sweating, lightheadedness, dizziness, syncope, presyncope, lower extremity swelling. Gastrointestinal: No nausea, vomiting, diarrhea, constipation, abdominal pain, hematemesis, melena, hematochezia Genitourinary:No urinary complaints. Musculoskeletal: as above otherwise negative Neurological: negative Behvioral/Psych: negative Endocrine: negative Subjective:  
  
Visit Vitals /40 (BP 1 Location: Right arm, BP Patient Position: At rest) Pulse 61 Temp 98.2 °F (36.8 °C) Resp 18 Ht 5' 8\" (1.727 m) Wt 141 lb 12.1 oz (64.3 kg) SpO2 94% BMI 21.55 kg/m² Physical Exam 
Abdomen: soft, non-tender. Bowel sounds normal. 
Extremities: no cyanosis or edema Heart: regular rate and rhythm, S1, S2 normal, no murmur, click, rub or gallop Lungs: clear to auscultation bilaterally Neck: supple, no carotid bruit and no JVD Neurologic: Grossly normal. Normal LE strength. Pulses: 2+ and symmetric Cardiographics Telemetry: normal sinus rhythm ECG: atrial fibrillation, RBBB Echocardiogram: Not done Labs:  
Recent Results (from the past 24 hour(s)) EKG, 12 LEAD, INITIAL Collection Time: 05/22/19  1:02 PM  
Result Value Ref Range  Ventricular Rate 75 BPM  
 Atrial Rate 357 BPM  
 QRS Duration 138 ms Q-T Interval 410 ms QTC Calculation (Bezet) 457 ms Calculated R Axis 90 degrees Calculated T Axis 73 degrees Diagnosis Atrial fibrillation with PVCs Right bundle branch block When compared with ECG of 17-SEP-2017 10:24, Atrial fibrillation has replaced Sinus rhythm Confirmed by Mica Denton ([id number]) on 5/22/2019 5:42:09 PM 
  
CBC WITH AUTOMATED DIFF Collection Time: 05/22/19  1:11 PM  
Result Value Ref Range WBC 8.7 4.1 - 11.1 K/uL  
 RBC 4.90 4. 10 - 5.70 M/uL  
 HGB 15.3 12.1 - 17.0 g/dL HCT 45.2 36.6 - 50.3 % MCV 92.2 80.0 - 99.0 FL  
 MCH 31.2 26.0 - 34.0 PG  
 MCHC 33.8 30.0 - 36.5 g/dL  
 RDW 12.8 11.5 - 14.5 % PLATELET 511 212 - 599 K/uL MPV 9.0 8.9 - 12.9 FL  
 NRBC 0.0 0  WBC ABSOLUTE NRBC 0.00 0.00 - 0.01 K/uL NEUTROPHILS 64 32 - 75 % LYMPHOCYTES 22 12 - 49 % MONOCYTES 12 5 - 13 % EOSINOPHILS 2 0 - 7 % BASOPHILS 0 0 - 1 % IMMATURE GRANULOCYTES 0 0.0 - 0.5 % ABS. NEUTROPHILS 5.6 1.8 - 8.0 K/UL  
 ABS. LYMPHOCYTES 1.9 0.8 - 3.5 K/UL  
 ABS. MONOCYTES 1.0 0.0 - 1.0 K/UL  
 ABS. EOSINOPHILS 0.2 0.0 - 0.4 K/UL  
 ABS. BASOPHILS 0.0 0.0 - 0.1 K/UL  
 ABS. IMM. GRANS. 0.0 0.00 - 0.04 K/UL  
 DF AUTOMATED METABOLIC PANEL, COMPREHENSIVE Collection Time: 05/22/19  1:11 PM  
Result Value Ref Range Sodium 133 (L) 136 - 145 mmol/L Potassium 4.1 3.5 - 5.1 mmol/L Chloride 98 97 - 108 mmol/L  
 CO2 32 21 - 32 mmol/L Anion gap 3 (L) 5 - 15 mmol/L Glucose 80 65 - 100 mg/dL BUN 13 6 - 20 MG/DL Creatinine 0.87 0.70 - 1.30 MG/DL  
 BUN/Creatinine ratio 15 12 - 20 GFR est AA >60 >60 ml/min/1.73m2 GFR est non-AA >60 >60 ml/min/1.73m2 Calcium 8.7 8.5 - 10.1 MG/DL Bilirubin, total 0.4 0.2 - 1.0 MG/DL  
 ALT (SGPT) 29 12 - 78 U/L  
 AST (SGOT) 37 15 - 37 U/L Alk. phosphatase 99 45 - 117 U/L Protein, total 7.6 6.4 - 8.2 g/dL Albumin 3.5 3.5 - 5.0 g/dL Globulin 4.1 (H) 2.0 - 4.0 g/dL A-G Ratio 0.9 (L) 1.1 - 2.2 CK W/ REFLX CKMB Collection Time: 05/22/19  1:11 PM  
Result Value Ref Range CK 77 39 - 308 U/L  
TROPONIN I Collection Time: 05/22/19  1:11 PM  
Result Value Ref Range Troponin-I, Qt. 0.10 (H) <0.05 ng/mL TROPONIN I Collection Time: 05/22/19  4:43 PM  
Result Value Ref Range Troponin-I, Qt. 0.10 (H) <0.05 ng/mL METABOLIC PANEL, BASIC Collection Time: 05/23/19  4:43 AM  
Result Value Ref Range Sodium 134 (L) 136 - 145 mmol/L Potassium 3.9 3.5 - 5.1 mmol/L Chloride 101 97 - 108 mmol/L  
 CO2 27 21 - 32 mmol/L Anion gap 6 5 - 15 mmol/L Glucose 89 65 - 100 mg/dL BUN 13 6 - 20 MG/DL Creatinine 0.85 0.70 - 1.30 MG/DL  
 BUN/Creatinine ratio 15 12 - 20 GFR est AA >60 >60 ml/min/1.73m2 GFR est non-AA >60 >60 ml/min/1.73m2 Calcium 9.0 8.5 - 10.1 MG/DL  
LIPID PANEL Collection Time: 05/23/19  4:43 AM  
Result Value Ref Range LIPID PROFILE Cholesterol, total 181 <200 MG/DL Triglyceride 85 <150 MG/DL  
 HDL Cholesterol 46 MG/DL  
 LDL, calculated 118 (H) 0 - 100 MG/DL VLDL, calculated 17 MG/DL  
 CHOL/HDL Ratio 3.9 0.0 - 5.0    
TROPONIN I Collection Time: 05/23/19  4:43 AM  
Result Value Ref Range Troponin-I, Qt. 0.08 (H) <0.05 ng/mL TSH 3RD GENERATION Collection Time: 05/23/19  4:43 AM  
Result Value Ref Range TSH 2.71 0.36 - 3.74 uIU/mL Assessment: 
 
 Assessment:  
  
 Active Problems: 
  New onset a-fib ([facility] [address] (5/22/2019) Leg weakness (5/22/2019) A-fib ([facility] [address] (5/22/2019) Plan: 1. PAF: now back in sinus rhythm. Rate controlled during a. Fib. Echo pending. Oral AC d/w wife in details. She does not want any oral AC for him. Aspirin daily. If Echo normal then no further work up. 2. Non specific troponin. No cardiac symptoms. No EKG changes to suggest ischemia.  With his reported dementia, don't think he is a good candidate for aggressive cardiac work up. D/w wife. Can consider further evaluation as out pt, if she decides to f/u with Dr. Sukhwinder Quinonez next month. 3. Plan for PT for LE weakness noted.

## 2019-05-23 NOTE — DISCHARGE INSTRUCTIONS
HOSPITALIST DISCHARGE INSTRUCTIONS    NAME: Kj Edwards   :  1934   MRN:  [id number]     Date/Time:  2019 4:11 PM    ADMIT DATE: 2019   DISCHARGE DATE: 2019     Attending Physician: Supa Moyer MD    DISCHARGE DIAGNOSIS:  Transient afib      Medications: Per above medication reconciliation. Pain Management: per above medications    Recommended diet: Regular Diet    Recommended activity: Activity as tolerated    Wound care: None    Indwelling devices:  None    Supplemental Oxygen: None    Required Lab work: None    Glucose management:  None    Code status: Full        Outside physician follow up: Follow-up Information     Follow up With Specialties Details Why Contact Info    Candice Ya MD Family Practice On 2019 PCP follow-up appointment at 2:00pm [address] [place] [address] 200 [phone]          Follow with cardiology in 2-4 weeks              Information obtained by :  I understand that if any problems occur once I am at home I am to contact my physician. I understand and acknowledge receipt of the instructions indicated above.                                                                                                                                            Physician's or R.N.'s Signature                                                                  Date/Time                                                                                                                                              Patient or Repres

## 2019-05-24 ENCOUNTER — PATIENT OUTREACH (OUTPATIENT)
Dept: INTERNAL MEDICINE CLINIC | Age: 84
End: 2019-05-24

## 2019-05-24 NOTE — PROGRESS NOTES
Hospital Discharge Follow-Up Date/Time:  2019 2:55 PM 
 
Patient was admitted to [facility] on  and discharged on  for DX. TRANSIENT AFIB., LEG WEAKNESS, DEMENTIA. The physician discharge summary was available at the time of outreach. Patient was contacted within 2 business days of discharge. Top Challenges reviewed with the provider AFib. weakness Method of communication with provider :chart routing Inpatient RRAT score: 13 Was this a readmission? no  
 
Nurse Navigator (NN) contacted the patient by telephone to perform post hospital discharge assessment. Verified name and  with patient as identifiers. Provided introduction to self, and explanation of the Nurse Navigator role. Reviewed discharge instructions and red flags with patient who verbalized understanding. Patient given an opportunity to ask questions and does not have any further questions or concerns at this time. The patient agrees to contact the PCP office for questions related to their healthcare. NN provided contact information for future reference. Disease Specific:   N/A Summary of patient's top problems: 1. A Fib. - pt. wife reports HR sometimes as low as 30s to high 130s. EKG shows atrial fibrillation 2. Weakness --   Pt.c/o bilat. leg weakness intermittently, pt.wife feels that this has been getting more frequent  lately Durable Medical Equipment ordered/company: [employer]  
Durable Medical Equipment received: RW Barriers to care? none Advance Care Planning:  
Does patient have an Advance Directive:  not on file Medication(s):  
Changed Medications at Discharge:  
aspirin delayed-release 81 mg tablet Take 1 Tab by mouth nightly Medication reconciliation was performed with patient, who verbalizes understanding of administration of home medications. There were no barriers to obtaining medications identified at this time. Referral to Pharm D needed: no  
 
Current Outpatient Medications Medication Sig  
 aspirin delayed-release 81 mg tablet Take 1 Tab by mouth nightly.  OTHER Outpatient PT For deconditioning  fluticasone propionate (FLONASE) 50 mcg/actuation nasal spray 2 Sprays by Both Nostrils route daily as needed for Rhinitis.  OTHER Take 1 Tab by mouth daily. Prevagen  pramoxine HCl/menthol (ANTI-ITCH EX) by Apply Externally route daily as needed (itch).  carboxymethylcellulose sodium (REFRESH TEARS OP) Administer 1-2 Drops to both eyes two (2) times a day.  acetaminophen/chlorpheniramine (CORICIDIN PO) Take 1 Tab by mouth daily.  ascorbic acid/multivit-min (EMERGEN-C PO) Take 1 Packet by mouth daily as needed (immune boost).  memantine (NAMENDA) 10 mg tablet Take 1 Tab by mouth two (2) times a day.  multivitamin (ONE A DAY) tablet Take 1 Tab by mouth daily.  aspirin delayed-release 81 mg tablet Take 81 mg by mouth nightly. No current facility-administered medications for this visit. [facility] follow up appointment(s):  
Future Appointments Date Time Provider Mk Bucki 5/29/2019  2:00 PM Barron Boudreaux MD [facility] LISA SCHED  
6/10/2019  2:15 PM Micheline Krueger MD [facility]  
9/23/2019 10:45 AM Barron Boudreaux MD [facility]:  out of service area Goals  Supportive resources in place to maintain patient in the community (ie., home health, equipment, DME, refer to, etc.)   
  5/24 Pt.has Hx. Alzheimer's, wife reports more confused on some days, each day if different. Pt.wife reports she independent with ADL's, continue to assist with light work around the home. Pt.wife very supportive of his care, provided transportation to appointments. Pt.wife reports she will call Tuesday, 5/28/19 to schedule outpatient PT, d/t patient bilat. Leg weakness. NN will f/u with pt.in 1-2 weeks. -[facility]  Understands red flags post discharge. 5/24 AFib.:  NN encouraged pt. PHI to monitor for symptoms which  include heart palpitations, shortness of breath, and weakness, lightheadedness, chest pain, feeling of the heart racing or beating irregularly. NN encouraged PHI to notify PCP or take pt.to ED for eval. PHI reports understanding. NN will f/u with pt.in 1-2 weeks. -[address]

## 2019-05-29 ENCOUNTER — OFFICE VISIT (OUTPATIENT)
Dept: INTERNAL MEDICINE CLINIC | Age: 84
End: 2019-05-29

## 2019-05-29 VITALS
DIASTOLIC BLOOD PRESSURE: 77 MMHG | HEART RATE: 68 BPM | RESPIRATION RATE: 16 BRPM | OXYGEN SATURATION: 96 % | SYSTOLIC BLOOD PRESSURE: 150 MMHG | HEIGHT: 68 IN | BODY MASS INDEX: 21.67 KG/M2 | TEMPERATURE: 98.2 F | WEIGHT: 143 LBS

## 2019-05-29 DIAGNOSIS — G30.9 ALZHEIMER'S DEMENTIA WITHOUT BEHAVIORAL DISTURBANCE, UNSPECIFIED TIMING OF DEMENTIA ONSET: ICD-10-CM

## 2019-05-29 DIAGNOSIS — F02.80 ALZHEIMER'S DEMENTIA WITHOUT BEHAVIORAL DISTURBANCE, UNSPECIFIED TIMING OF DEMENTIA ONSET: ICD-10-CM

## 2019-05-29 DIAGNOSIS — I48.0 PAROXYSMAL ATRIAL FIBRILLATION (HCC): Primary | ICD-10-CM

## 2019-05-29 NOTE — PROGRESS NOTES
Chief Complaint   Patient presents with   [address] 2 days     I have reviewed the patient's medical history in detail and updated the computerized patient record. .hm    1. Have you been to the ER, urgent care clinic since your last visit? Hospitalized since your last visit? yes    2. Have you seen or consulted any other health care providers outside of the [address] [place] [address] [place] since your last visit? Include any pap smears or colon screening. ED [facility] ADM    Pt stated he/she does have an Advance Directive    Fall Risk Assessment, last 12 mths 5/29/2019   Able to walk? Yes   Fall in past 12 months? -   Fall with injury? -   Number of falls in past 12 months -   Fall Risk Score -       3 most recent PHQ Screens 5/14/2019   Little interest or pleasure in doing things Several days   Feeling down, depressed, irritable, or hopeless Several days   Total Score PHQ 2 2       Abuse Screening Questionnaire 4/25/2019   Do you ever feel afraid of your partner? N   Are you in a relationship with someone who physically or mentally threatens you? N   Is it safe for you to go home?  Y       ADL Assessment 4/25/2019   Feeding yourself No Help Needed   Getting from bed to chair No Help Needed   Getting dressed No Help Needed   Bathing or showering No Help Needed   Walk across the room (includes cane/walker) No Help Needed   Using the telphone No Help Needed   Taking your medications No Help Needed   Preparing meals No Help Needed   Managing money (expenses/bills) No Help Needed   Moderately strenuous housework (laundry) No Help Needed   Shopping for personal items (toiletries/medicines) No Help Needed   Shopping for groceries No Help Needed   Driving No Help Needed   Climbing a flight of stairs No Help Needed   Getting to places beyond walking distances No Help Needed

## 2019-05-29 NOTE — PROGRESS NOTES
HISTORY OF PRESENT ILLNESS  Keily Sy is a 80 y.o. male. Extremity Weakness   The history is provided by the patient and spouse. This is a new problem. The current episode started more than 1 week ago. The problem has been gradually improving. There was no focality noted. Primary symptoms include loss of balance and movement disorder. Pertinent negatives include no focal weakness, no speech difficulty and no mental status change. Primary symptoms comment: trouble walking. There has been no fever. Pertinent negatives include no shortness of breath, no chest pain and no altered mental status. Associated medical issues include dementia. Seeing cards June 10th  [facility] 5/22/19 [facility] 5/23/19  Changed BT ot delayed asa, not on newer BT    Current Outpatient Medications   Medication Sig Dispense Refill    OTHER Outpatient PT  For deconditioning 1 UNSPECIFIED 0    fluticasone propionate (FLONASE) 50 mcg/actuation nasal spray 2 Sprays by Both Nostrils route daily as needed for Rhinitis.  OTHER Take 1 Tab by mouth daily. Prevagen      pramoxine HCl/menthol (ANTI-ITCH EX) by Apply Externally route daily as needed (itch).  carboxymethylcellulose sodium (REFRESH TEARS OP) Administer 1-2 Drops to both eyes two (2) times a day.  acetaminophen/chlorpheniramine (CORICIDIN PO) Take 1 Tab by mouth daily.  ascorbic acid/multivit-min (EMERGEN-C PO) Take 1 Packet by mouth daily as needed (immune boost).  memantine (NAMENDA) 10 mg tablet Take 1 Tab by mouth two (2) times a day. 180 Tab 3    multivitamin (ONE A DAY) tablet Take 1 Tab by mouth daily.  aspirin delayed-release 81 mg tablet Take 81 mg by mouth nightly. Review of Systems   Eyes: Negative for blurred vision. Respiratory: Negative for shortness of breath. Cardiovascular: Negative for chest pain. Neurological: Positive for weakness and loss of balance. Negative for focal weakness and speech difficulty.      Social History Socioeconomic History    Marital status:      Spouse name: Not on file    Number of children: 3    Years of education: Not on file    Highest education level: Not on file   Occupational History     Employer: RETIRED   Social Needs    Financial resource strain: Not on file    Food insecurity:     Worry: Not on file     Inability: Not on file    Transportation needs:     Medical: Not on file     Non-medical: Not on file   Tobacco Use    Smoking status: Never Smoker    Smokeless tobacco: Never Used   Substance and Sexual Activity    Alcohol use: No     Alcohol/week: 0.0 oz    Drug use: No    Sexual activity: Not Currently   Lifestyle    Physical activity:     Days per week: Not on file     Minutes per session: Not on file    Stress: Not on file   Relationships    Social connections:     Talks on phone: Not on file     Gets together: Not on file     Attends [employer] service: Not on file     Active member of club or organization: Not on file     Attends meetings of clubs or organizations: Not on file     Relationship status: Not on file    Intimate partner violence:     Fear of current or ex partner: Not on file     Emotionally abused: Not on file     Physically abused: Not on file     Forced sexual activity: Not on file   Other Topics Concern    Not on file   Social History Narrative    Lives with wife       Physical Exam  Visit Vitals  /77 (BP 1 Location: Left arm, BP Patient Position: Sitting)   Pulse 68   Temp 98.2 °F (36.8 °C) (Temporal)   Resp 16   Ht 5' 8\" (1.727 m)   Wt 143 lb (64.9 kg)   SpO2 96%   BMI 21.74 kg/m²     Well developed well nourished no acute distress. mld dementia  Pupils equal round react to light, extraocular muscles intact. Tympanic membranes within normal limits throat unremarkable  Cranial nerves II through XII are intact.   Neck unremarkable  Heart regular rate and rhythm without clicks murmurs rubs  Lungs are clear to auscultation  Abdomen soft.  Extremities, motor 5 out of 5 bilaterally, reflexes 2+ equal bilaterally. ASSESSMENT and PLAN  Encounter Diagnoses   Name Primary?     Paroxysmal atrial fibrillation (HCC) Yes    Alzheimer's dementia without behavioral disturbance, unspecified timing of dementia onset

## 2019-06-10 ENCOUNTER — OFFICE VISIT (OUTPATIENT)
Dept: CARDIOLOGY CLINIC | Age: 84
End: 2019-06-10

## 2019-06-10 VITALS
OXYGEN SATURATION: 96 % | HEART RATE: 71 BPM | RESPIRATION RATE: 18 BRPM | WEIGHT: 142.5 LBS | DIASTOLIC BLOOD PRESSURE: 54 MMHG | SYSTOLIC BLOOD PRESSURE: 118 MMHG | BODY MASS INDEX: 21.6 KG/M2 | HEIGHT: 68 IN

## 2019-06-10 DIAGNOSIS — I48.0 PAF (PAROXYSMAL ATRIAL FIBRILLATION) (HCC): ICD-10-CM

## 2019-06-10 DIAGNOSIS — I65.29 STENOSIS OF CAROTID ARTERY, UNSPECIFIED LATERALITY: Primary | ICD-10-CM

## 2019-06-10 RX ORDER — METOPROLOL SUCCINATE 25 MG/1
12.5 TABLET, EXTENDED RELEASE ORAL
Qty: 45 TAB | Refills: 1 | Status: SHIPPED | OUTPATIENT
Start: 2019-06-10 | End: 2019-08-16 | Stop reason: SDUPTHER

## 2019-06-10 NOTE — PATIENT INSTRUCTIONS
Mr. Kasper's heart rate will be falsely low when checked with a home blood pressure monitor, pulse oximeter or by direct palpation of his pulse due to having premature ventricular contractions. His true heart rate can be only calculated while having premature ventricular contractions electrically i.e.: Twelve-lead EKG or heart rhythm monitor. When the patient has a premature ventricular contraction a heartbeat has occurred however the ventricle was not completely filled with blood degenerate pulse pressure i.e. the feeling of a pulse. A blood pressure cuff, pulse oximeter and direct palpation of the pulse cannot be detected most times during the PVC.

## 2019-06-10 NOTE — PROGRESS NOTES
Reviewed record in preparation for visit and obtained necessary documentation. Verified patient with 2 identifiers Chief Complaint Patient presents with  Hypotension Follow up - denies cardiac sx - wife states patient has alzheimers and states he needs to stay active and wants to know how much he should be doing  Irregular Heart Beat 1. Have you been to the ER, urgent care clinic since your last visit? Hospitalized since your last visit? Yes on 5/22/19 for leg weakness 2. Have you seen or consulted any other health care providers outside of the [address] [place] [address] since your last visit? Include any pap smears or colon screening. Yes Dr Phylicia Mahmood

## 2019-06-10 NOTE — PROGRESS NOTES
Lorrie Puckett DNP, ANP-BC Subjective/HPI:  
 
Obi Herrera is a 80 y.o. male is here for hospital follow-up where patient was admitted for leg weakness. Discovered to have a slight troponin elevation and new onset atrial fibrillation. Hospitalist note: 
Tad Kasper is a 80 y.o.   male with a history of dementia who presents to the ER with his wife because of concerns about leg weakness.    Patient has been having intermittent spells where he will have acute weakness in his legs, stop walking and will have to grab onto some furniture or the wall.  Wife feels that this has been getting more frequent lately. Shukri Hodgson has been checking the patient's BP and and noted that his HR would be variable and sometimes as low as 30 and as high as 130.  No CP, SOB or dizziness.   No LOC or syncope.  Patient saw evaluated for this by his PCP on 5/14 and wife requested for a cardiology referral which was set for June 1st with Dr Mammie Felty. Julia Montanofts to the persistent issues with his walking and her concern that the patient needed to be seen sooner, she brought the patient to the ER.  Work up demonstrated Afib and troponin elevation.   Cardiology recommended admission for work up.    We were asked to admit for work up and evaluation of the above  
  
  
______________________________________________________________________ DISCHARGE SUMMARY/HOSPITAL COURSE:  for full details see H&P, daily progress notes, labs, consult notes.  
  
Atrial fib/flutter, unclear duration-now in NSR Mild troponin elevation-stable 
  
- TSH normal 
-Echocardiogram unremarkable. 
-continue ASA 
-cardiology cleared him for discharge.  
  
LE weakness / difficulty ambulating  
-CT head negative.   Exam non focal with no rigidity or tremors 
-probably some deconditioning. Outpatient PT 
  
Dementia with some agitation -  Continue namenda, prn haldol. 
  
S/P CEA 
-continue ASA 
  
Recent URI 
-CXR with RML atelectasis.    
 -cough has resolved.    
 
 
 
PCP Provider Rachel Garcia MD 
Past Medical History:  
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  
Procedure Laterality Date  HX CAROTID ENDARTERECTOMY Rt.  
 HX CATARACT REMOVAL Bilateral 09/2017  HX HERNIA REPAIR  6/1/1985  HI COLONOSCOPY FLX DX W/COLLJ SPEC WHEN PFRMD  6/30/2010 Allergies Allergen Reactions  Pcn [Penicillins] Hives Pt says not allergic  Beef Containing Products Rash Family History Problem Relation Age of Onset  Stroke Mother  Heart Disease Mother  Heart Disease Father Current Outpatient Medications Medication Sig  
 metoprolol succinate (TOPROL-XL) 25 mg XL tablet Take 0.5 Tabs by mouth nightly.  OTHER Outpatient PT For deconditioning  fluticasone propionate (FLONASE) 50 mcg/actuation nasal spray 2 Sprays by Both Nostrils route daily as needed for Rhinitis.  OTHER Take 1 Tab by mouth daily. Prevagen  pramoxine HCl/menthol (ANTI-ITCH EX) by Apply Externally route daily as needed (itch).  carboxymethylcellulose sodium (REFRESH TEARS OP) Administer 1-2 Drops to both eyes two (2) times a day.  ascorbic acid/multivit-min (EMERGEN-C PO) Take 1 Packet by mouth daily as needed (immune boost).  memantine (NAMENDA) 10 mg tablet Take 1 Tab by mouth two (2) times a day.  multivitamin (ONE A DAY) tablet Take 1 Tab by mouth daily.  aspirin delayed-release 81 mg tablet Take 81 mg by mouth nightly.  acetaminophen/chlorpheniramine (CORICIDIN PO) Take 1 Tab by mouth daily. No current facility-administered medications for this visit. Vitals:  
 06/10/19 1404 06/10/19 1408 BP: 120/60 118/54 Pulse: 71 Resp: 18 SpO2: 96% Weight: 142 lb 8 oz (64.6 kg) Height: 5' 8\" (1.727 m) Social History Socioeconomic History  Marital status:  Spouse name: Not on file  Number of children: 4  
 Years of education: Not on file  Highest education level: Not on file Occupational History Employer: RETIRED Social Needs  Financial resource strain: Not on file  Food insecurity:  
  Worry: Not on file Inability: Not on file  Transportation needs:  
  Medical: Not on file Non-medical: Not on file Tobacco Use  Smoking status: Never Smoker  Smokeless tobacco: Never Used Substance and Sexual Activity  Alcohol use: No  
  Alcohol/week: 0.0 oz  Drug use: No  
 Sexual activity: Not Currently Lifestyle  Physical activity:  
  Days per week: Not on file Minutes per session: Not on file  Stress: Not on file Relationships  Social connections:  
  Talks on phone: Not on file Gets together: Not on file Attends Sabianism service: Not on file Active member of club or organization: Not on file Attends meetings of clubs or organizations: Not on file Relationship status: Not on file  Intimate partner violence:  
  Fear of current or ex partner: Not on file Emotionally abused: Not on file Physically abused: Not on file Forced sexual activity: Not on file Other Topics Concern  Not on file Social History Narrative Lives with wife I have reviewed the nurses notes, vitals, problem list, allergy list, medical history, family, social history and medications. Review of Symptoms: Assisted by wife secondary to Alzheimer's General: Pt denies excessive weight gain or loss. Able to walk, ride exercise bike HEENT: No reportedblurred vision, headaches, epistaxis and difficulty swallowing. Respiratory: No reported shortness of breath, SIMPSON, wheezing or stridor. Cardiovascular: Denies precordial pain, palpitations, edema or PND Gastrointestinal: Denies poor appetite, indigestion, abdominal pain or blood in stool Musculoskeletal: Denies pain or swelling from muscles or joints Neurologic: Denies tremor, paresthesias, or sensory motor disturbance Skin: Denies rash, itching or texture change. Physical Exam:   
 
General: Well developed, in no acute distress, cooperative and alert HEENT: No carotid bruits, no JVD, trach is midline. Neck Supple, PEERL, Respiratory: Clear bilaterally x 4, no wheezing or rales Abdomen:   Soft, non-tender, no masses, bowel sounds are active.  
Extremities:  No edema, normal cap refill, no cyanosis, atraumatic. Neuro: A&Ox1, speech clear, gait stable. Skin: Skin color is normal. No rashes or lesions. Non diaphoretic Vascular: 2+ pulses symmetric in all extremities Cardiographics ECG: Sinus rhythm Results for orders placed or performed during the hospital encounter of 05/22/19 EKG, 12 LEAD, INITIAL Result Value Ref Range Ventricular Rate 75 BPM  
 Atrial Rate 357 BPM  
 QRS Duration 138 ms Q-T Interval 410 ms QTC Calculation (Bezet) 457 ms Calculated R Axis 90 degrees Calculated T Axis 73 degrees Diagnosis Atrial fibrillation with PVCs Right bundle branch block When compared with ECG of 17-SEP-2017 10:24, Atrial fibrillation has replaced Sinus rhythm Confirmed by Darien Tovar ([id number]) on 5/22/2019 5:42:09 PM 
  
 
 
 
Cardiology Labs: 
Lab Results Component Value Date/Time Cholesterol, total 181 05/23/2019 04:43 AM  
 HDL Cholesterol 46 05/23/2019 04:43 AM  
 LDL, calculated 118 (H) 05/23/2019 04:43 AM  
 Triglyceride 85 05/23/2019 04:43 AM  
 CHOL/HDL Ratio 3.9 05/23/2019 04:43 AM  
 
 
Lab Results Component Value Date/Time  Sodium 134 (L) 05/23/2019 04:43 AM  
 Potassium 3.9 05/23/2019 04:43 AM  
 Chloride 101 05/23/2019 04:43 AM  
 CO2 27 05/23/2019 04:43 AM  
 Anion gap 6 05/23/2019 04:43 AM  
 Glucose 89 05/23/2019 04:43 AM  
 BUN 13 05/23/2019 04:43 AM  
 Creatinine 0.85 05/23/2019 04:43 AM  
 BUN/Creatinine ratio 15 05/23/2019 04:43 AM  
 GFR est AA >60 05/23/2019 04:43 AM  
 GFR est non-AA >60 05/23/2019 04:43 AM  
 Calcium 9.0 05/23/2019 04:43 AM  
 Bilirubin, total 0.4 05/22/2019 01:11 PM  
 AST (SGOT) 37 05/22/2019 01:11 PM  
 Alk. phosphatase 99 05/22/2019 01:11 PM  
 Protein, total 7.6 05/22/2019 01:11 PM  
 Albumin 3.5 05/22/2019 01:11 PM  
 Globulin 4.1 (H) 05/22/2019 01:11 PM  
 A-G Ratio 0.9 (L) 05/22/2019 01:11 PM  
 ALT (SGPT) 29 05/22/2019 01:11 PM  
  
 
 
 Assessment: 
 
 Assessment:  
 
Diagnoses and all orders for this visit: 1. Stenosis of carotid artery, unspecified laterality -     AMB POC EKG ROUTINE W/ 12 LEADS, INTER & REP 2. PAF (paroxysmal atrial fibrillation) ([facility] [address] Other orders 
-     metoprolol succinate (TOPROL-XL) 25 mg XL tablet; Take 0.5 Tabs by mouth nightly. ICD-10-CM ICD-9-CM 1. Stenosis of carotid artery, unspecified laterality I65.29 433.10 AMB POC EKG ROUTINE W/ 12 LEADS, INTER & REP 2. PAF (paroxysmal atrial fibrillation) ([facility]) I48.0 427.31 Orders Placed This Encounter  AMB POC EKG ROUTINE W/ 12 LEADS, INTER & REP Order Specific Question:   Reason for Exam: Answer:   ROUTINE  metoprolol succinate (TOPROL-XL) 25 mg XL tablet Sig: Take 0.5 Tabs by mouth nightly. Dispense:  45 Tab Refill:  1 Plan:  
 
Patient is a 57-year-old male with moderate to severe dementia admitted for intermittent episodes of leg weakness. Discovered to be in rate controlled atrial fibrillation self converted, small troponin elevation. Has been able to walk and exercise on an exercise bike without reported dyspnea or chest pain according to his wife. Inpatient echocardiogram shows structurally normal heart. Presents today in sinus rhythm with known right bundle branch block.  
Risks versus benefit of anticoagulation: Given recent fall, dementia and advanced age the patient is a high risk for injury, will maintain on enteric-coated aspirin therapy. Low dose betablocker for PAF. Mild troponin elevation, asymptomatic normal EF% no further work up needed Follow up for EKG in 1 month Swapna Gordillo MD 
 
This note was created using voice recognition software. Despite editing, there may be syntax errors.

## 2019-06-11 ENCOUNTER — PATIENT OUTREACH (OUTPATIENT)
Dept: INTERNAL MEDICINE CLINIC | Age: 84
End: 2019-06-11

## 2019-06-11 NOTE — PROGRESS NOTES
Goals  Supportive resources in place to maintain patient in the community (ie., home health, equipment, DME, refer to, etc.)   
  5/24 Pt.has Hx. Alzheimer's, wife reports more confused on some days, each day if different. Pt.wife reports she independent with ADL's, continue to assist with light work around the home. Pt.wife very supportive of his care, provided transportation to appointments. Pt.wife reports she will call Tuesday, 5/28/19 to schedule outpatient PT, d/t patient bilat. Leg weakness. NN will f/u with pt.in 1-2 weeks. - 
 
6/11 Pt.attend hosp. f/u with PCP,  on 5/29 and was seen by [facility] [place] [facility])John DNP, ANP-BC on 6/10. NN attempt to contact pt./wife, unable to reach, NN will f/u with pt. PT in 1 week. -[address] 
 
6/11 Pt.wife return NN call reports pt.has attend f/u appts. , reports he is doing well. NN reminded pt.wife (PHI) upcoming appointments: Santo Stauffer reports he will attend upcoming appointments. NN will f/u with pt.in 1-2 weeks. -[address] 
7/8/2019 1:00 PM Rahel Zeng MD [facility] 7/10/2019 11:00 AM Bethany Wade MD [place] [id number]  Understands red flags post discharge. 5/24 AFib.:  NN encouraged pt. PHI to monitor for symptoms which  include heart palpitations, shortness of breath, and weakness, lightheadedness, chest pain, feeling of the heart racing or beating irregularly. NN encouraged PHI to notify PCP or take pt.to ED for eval. PHI reports understanding. NN will f/u with pt.in 1-2 weeks. -[address]  
 
6/11 Pt.was seen by [facility] [place] [facility])John DNP, ANP-BC on 6/10 f/u hosp. admission. Pt.medication dose changed (Metoprolol), and pt.to start out patient PT, for leg weakness. NN attempt to contact pt./wife, unable to reach. NN will f/u with pt.in 1 week. -[address] 
 
6/11 Pt.wife return NN call, reports pt.will start therapy here in [facility] on 6/19/19. NN will f/u with pt.progress in 1-2 weeks. -[address]

## 2019-07-03 ENCOUNTER — PATIENT OUTREACH (OUTPATIENT)
Dept: INTERNAL MEDICINE CLINIC | Age: 84
End: 2019-07-03

## 2019-07-03 NOTE — PROGRESS NOTES
Patient has graduated from the Transitions of Care Coordination  program on 7/3/19. Patient's symptoms are stable at this time. Patient/family has the ability to self-manage. Care management goals have been completed at this time. No further nurse navigator follow up scheduled. Goals Addressed This Visit's Progress  Supportive resources in place to maintain patient in the community (ie., home health, equipment, DME, refer to, etc.)   On track 5/24 Pt.has Hx. Alzheimer's, wife reports more confused on some days, each day if different. Pt.wife reports she independent with ADL's, continue to assist with light work around the home. Pt.wife very supportive of his care, provided transportation to appointments. Pt.wife reports she will call Tuesday, 5/28/19 to schedule outpatient PT, d/t patient bilat. Leg weakness. NN will f/u with pt.in 1-2 weeks. - 
 
6/11 Pt.attend hosp. f/u with PCP,  on 5/29 and was seen by [facility] [place] [facility]), Stella Gifford DNP, ANP-BC on 6/10. NN attempt to contact pt./wife, unable to reach, NN will f/u with pt. PT in 1 week. -[facility] 
 
6/11 Pt.wife return NN call reports pt.has attend f/u appts. , reports he is doing well. NN reminded pt.wife (PHI) upcoming appointments: Pascual Kennedy reports he will attend upcoming appointments. NN will f/u with pt.in 1-2 weeks. -[facility] 
7/8/2019 1:00 PM Juan Jose Reese MD [facility] 7/10/2019 11:00 AM Shira Martin MD [place] [id number] 7/3 Pt.PHI (wife) reports pt.will attend upcoming appts:  on 7/12/19 and (Cardiology) Dr. Ramon Rocha on 7/19/19.-  Understands red flags post discharge. On track 5/24 AFib.:  NN encouraged pt. PHI to monitor for symptoms which  include heart palpitations, shortness of breath, and weakness, lightheadedness, chest pain, feeling of the heart racing or beating irregularly.  NN encouraged PHI to notify PCP or take pt.to ED for eval. PHI reports understanding. NN will f/u with pt.in 1-2 weeks. -[address]  
 
6/11 Pt.was seen by [facility] [place] [facility]), Minesh Malik DNP, ANP-BC on 6/10 f/u hosp. admission. Pt.medication dose changed (Metoprolol), and pt.to start out patient PT, for leg weakness. NN attempt to contact pt./wife, unable to reach. NN will f/u with pt.in 1 week. -[address] 
 
6/11 Pt.wife return NN call, reports pt.will start therapy here in [place] [facility] on 6/19/19. NN will f/u with pt.progress in 1-2 weeks. -[address]  
 
7/3/19 NN spoke with pt. PHI (wife), reports pt. is doing much better since seeing cardiologist and medication changes. PHI reports pt.doing very well with PT, she has seen much improvement and she is very please with the way things are going. -[address] Pt has nurse navigator's contact information for any further questions, concerns, or needs. Patients upcoming visits:   
Future Appointments Date Time Provider Mk Dsouza 7/12/2019 10:45 AM Samanta Ang MD [facility] LISA DAVID  
7/19/2019  1:00 PM Patti Martinez MD [facility]  
9/23/2019 10:45 AM Samanta Ang MD [facility]

## 2019-07-19 ENCOUNTER — TELEPHONE (OUTPATIENT)
Dept: CARDIOLOGY CLINIC | Age: 84
End: 2019-07-19

## 2019-07-19 NOTE — TELEPHONE ENCOUNTER
Please call the patient. Patient asking if he can have a continued PT order at [facility] for limb weakness, it's been very beneficial and the hosp originally wrote it and it ran out. Had to r/d pt's appt for 07/19/2019, due to [facility] issues here.  Thanks

## 2019-07-22 NOTE — TELEPHONE ENCOUNTER
Spoke with patient's wife Andersonkimberlyjustin Duque  Verified patient with 2 patient identifiers  Informed need speak with PCP reference Home Health order. Patient's  wife verbalized understanding, states patient has f/u with PCP scheduled Friday 7/26/2019.

## 2019-07-26 ENCOUNTER — OFFICE VISIT (OUTPATIENT)
Dept: INTERNAL MEDICINE CLINIC | Age: 84
End: 2019-07-26

## 2019-07-26 VITALS
SYSTOLIC BLOOD PRESSURE: 103 MMHG | RESPIRATION RATE: 16 BRPM | WEIGHT: 142 LBS | OXYGEN SATURATION: 95 % | HEART RATE: 73 BPM | HEIGHT: 68 IN | TEMPERATURE: 97.4 F | DIASTOLIC BLOOD PRESSURE: 65 MMHG | BODY MASS INDEX: 21.52 KG/M2

## 2019-07-26 DIAGNOSIS — G30.9 ALZHEIMER'S DEMENTIA WITHOUT BEHAVIORAL DISTURBANCE, UNSPECIFIED TIMING OF DEMENTIA ONSET: ICD-10-CM

## 2019-07-26 DIAGNOSIS — R29.898 WEAKNESS OF BOTH LEGS: ICD-10-CM

## 2019-07-26 DIAGNOSIS — F02.80 ALZHEIMER'S DEMENTIA WITHOUT BEHAVIORAL DISTURBANCE, UNSPECIFIED TIMING OF DEMENTIA ONSET: ICD-10-CM

## 2019-07-26 DIAGNOSIS — I48.0 PAROXYSMAL ATRIAL FIBRILLATION (HCC): Primary | ICD-10-CM

## 2019-07-26 NOTE — PROGRESS NOTES
Chief Complaint   Patient presents with    Irregular Heart Beat     follow up    Extremity Weakness     pt needs a PT Referral     I have reviewed the patient's medical history in detail and updated the computerized patient record. Health Maintenance reviewed. 1. Have you been to the ER, urgent care clinic since your last visit? Hospitalized since your last visit?no    2. Have you seen or consulted any other health care providers outside of the [address] [place] [address] [place] since your last visit? Include any pap smears or colon screening. No    Pt stated he/she does have an Advance Directive    Fall Risk Assessment, last 12 mths 7/26/2019   Able to walk? Yes   Fall in past 12 months? No   Fall with injury? -   Number of falls in past 12 months -   Fall Risk Score -       3 most recent PHQ Screens 7/26/2019   Little interest or pleasure in doing things Several days   Feeling down, depressed, irritable, or hopeless Several days   Total Score PHQ 2 2       Abuse Screening Questionnaire 4/25/2019   Do you ever feel afraid of your partner? N   Are you in a relationship with someone who physically or mentally threatens you? N   Is it safe for you to go home?  Y       ADL Assessment 4/25/2019   Feeding yourself No Help Needed   Getting from bed to chair No Help Needed   Getting dressed No Help Needed   Bathing or showering No Help Needed   Walk across the room (includes cane/walker) No Help Needed   Using the telphone No Help Needed   Taking your medications No Help Needed   Preparing meals No Help Needed   Managing money (expenses/bills) No Help Needed   Moderately strenuous housework (laundry) No Help Needed   Shopping for personal items (toiletries/medicines) No Help Needed   Shopping for groceries No Help Needed   Driving No Help Needed   Climbing a flight of stairs No Help Needed   Getting to places beyond walking distances No Help Needed

## 2019-07-26 NOTE — PROGRESS NOTES
Subjective:     Mayra Carrillo is a 80 y.o. male who presents for follow up of Atrial Fibrillation. Here with wife, they are happy and laughing very pleasant. Seen cards on betablocker for a fib  New concerns: no c/o, recovered from [employer], doing PT uses exercise bike with prob. BP low today. Moving better no pain complaints or dizziness. Reports taking blood pressure medications without side affects. No complaints of exertional chest pain, excessive shortness of breath or focal weakness. Minimal swelling in lower legs or dizziness with standing. Doing PT at [facility] it's helping. Current Outpatient Medications   Medication Sig Dispense Refill    metoprolol succinate (TOPROL-XL) 25 mg XL tablet Take 0.5 Tabs by mouth nightly. 45 Tab 1    OTHER Outpatient PT  For deconditioning 1 UNSPECIFIED 0    fluticasone propionate (FLONASE) 50 mcg/actuation nasal spray 2 Sprays by Both Nostrils route daily as needed for Rhinitis.  OTHER Take 1 Tab by mouth daily. Prevagen      pramoxine HCl/menthol (ANTI-ITCH EX) by Apply Externally route daily as needed (itch).  carboxymethylcellulose sodium (REFRESH TEARS OP) Administer 1-2 Drops to both eyes two (2) times a day.  acetaminophen/chlorpheniramine (CORICIDIN PO) Take 1 Tab by mouth daily.  ascorbic acid/multivit-min (EMERGEN-C PO) Take 1 Packet by mouth daily as needed (immune boost).  memantine (NAMENDA) 10 mg tablet Take 1 Tab by mouth two (2) times a day. 180 Tab 3    multivitamin (ONE A DAY) tablet Take 1 Tab by mouth daily.  aspirin delayed-release 81 mg tablet Take 81 mg by mouth nightly. Allergies   Allergen Reactions    Pcn [Penicillins] Hives     Pt says not allergic    Beef Containing Products Rash       Diet and Lifestyle: nonsmoker    Cardiovascular ROS: taking medications as instructed, no medication side effects noted, no TIA's, no chest pain on exertion, no dyspnea on exertion, no swelling of ankles. Review of Systems, additional:  Pertinent items are noted in HPI. Patient Active Problem List    Diagnosis Date Noted    Leg weakness 05/22/2019    PAF (paroxysmal atrial fibrillation) ([facility] [address] 05/22/2019    Dementia of the Alzheimer's type, with late onset, uncomplicated 01/82/8968    Abnormal EKG 09/18/2017    Alzheimer's disease 10/26/2015    Allergic rhinitis 11/13/2014    Stenosis of carotid artery 10/18/2013    BPH (benign prostatic hyperplasia) 08/16/2011     Current Outpatient Medications   Medication Sig Dispense Refill    metoprolol succinate (TOPROL-XL) 25 mg XL tablet Take 0.5 Tabs by mouth nightly. 45 Tab 1    OTHER Outpatient PT  For deconditioning 1 UNSPECIFIED 0    fluticasone propionate (FLONASE) 50 mcg/actuation nasal spray 2 Sprays by Both Nostrils route daily as needed for Rhinitis.  OTHER Take 1 Tab by mouth daily. Prevagen      pramoxine HCl/menthol (ANTI-ITCH EX) by Apply Externally route daily as needed (itch).  carboxymethylcellulose sodium (REFRESH TEARS OP) Administer 1-2 Drops to both eyes two (2) times a day.  acetaminophen/chlorpheniramine (CORICIDIN PO) Take 1 Tab by mouth daily.  ascorbic acid/multivit-min (EMERGEN-C PO) Take 1 Packet by mouth daily as needed (immune boost).  memantine (NAMENDA) 10 mg tablet Take 1 Tab by mouth two (2) times a day. 180 Tab 3    multivitamin (ONE A DAY) tablet Take 1 Tab by mouth daily.  aspirin delayed-release 81 mg tablet Take 81 mg by mouth nightly.        Allergies   Allergen Reactions    Pcn [Penicillins] Hives     Pt says not allergic    Beef Containing Products Rash     Social History     Tobacco Use    Smoking status: Never Smoker    Smokeless tobacco: Never Used   Substance Use Topics    Alcohol use: No     Alcohol/week: 0.0 standard drinks                 Objective:     Physical exam significant for the following:     Elderly pleasant laughing    Visit Vitals  /65 (BP 1 Location: Left arm, BP Patient Position: Sitting)   Pulse 73   Temp 97.4 °F (36.3 °C) (Oral)   Resp 16   Ht 5' 8\" (1.727 m)   Wt 142 lb (64.4 kg)   SpO2 95%   BMI 21.59 kg/m²     Appearance: alert, well appearing, and in no distress. Low BP as noted above  General exam: CVS exam BP noted to be well controlled today in office, S1, S2 normal, no gallop, no murmur, chest clear, no JVD, no HSM, no edema. . 2 through 12 grossly normal  Assessment/Plan:     A fib well controlled, stable      ICD-10-CM ICD-9-CM    1. Paroxysmal atrial fibrillation (HCC) I48.0 427.31    2. Alzheimer's dementia without behavioral disturbance, unspecified timing of dementia onset G30.9 331.0     F02.80 294.10    3. Weakness of both legs R29.898 729.89 AMB SUPPLY ORDER     Orders Placed This Encounter    AMB SUPPLY ORDER     [facility] leg weakness     Cont  Follow-up and Dispositions    · Return for medicare annual.         .

## 2019-08-16 ENCOUNTER — OFFICE VISIT (OUTPATIENT)
Dept: CARDIOLOGY CLINIC | Age: 84
End: 2019-08-16

## 2019-08-16 VITALS
HEIGHT: 68 IN | BODY MASS INDEX: 21.54 KG/M2 | HEART RATE: 68 BPM | WEIGHT: 142.1 LBS | DIASTOLIC BLOOD PRESSURE: 70 MMHG | RESPIRATION RATE: 16 BRPM | SYSTOLIC BLOOD PRESSURE: 112 MMHG | OXYGEN SATURATION: 98 %

## 2019-08-16 DIAGNOSIS — G30.9 ALZHEIMER'S DEMENTIA WITHOUT BEHAVIORAL DISTURBANCE, UNSPECIFIED TIMING OF DEMENTIA ONSET: ICD-10-CM

## 2019-08-16 DIAGNOSIS — I48.0 PAF (PAROXYSMAL ATRIAL FIBRILLATION) (HCC): Primary | ICD-10-CM

## 2019-08-16 DIAGNOSIS — F02.80 ALZHEIMER'S DEMENTIA WITHOUT BEHAVIORAL DISTURBANCE, UNSPECIFIED TIMING OF DEMENTIA ONSET: ICD-10-CM

## 2019-08-16 RX ORDER — GLUCOSAMINE SULFATE 1500 MG
2000 POWDER IN PACKET (EA) ORAL DAILY
COMMUNITY

## 2019-08-16 RX ORDER — METOPROLOL SUCCINATE 25 MG/1
12.5 TABLET, EXTENDED RELEASE ORAL
Qty: 45 TAB | Refills: 3 | Status: SHIPPED | OUTPATIENT
Start: 2019-08-16 | End: 2020-01-01 | Stop reason: SDUPTHER

## 2019-08-16 RX ORDER — DOCUSATE SODIUM 100 MG/1
100 CAPSULE, LIQUID FILLED ORAL 2 TIMES DAILY
COMMUNITY

## 2019-08-16 NOTE — PROGRESS NOTES
Iqraaubree Segundo DNP, ANP-BC  Subjective/HPI:     Bayron Blank is a 80 y.o. male is here for one-month follow-up history of paroxysmal A. fib. EKG today demonstrates he is in sinus rhythm. High risk for anticoagulation has done well with low-dose aspirin and beta-blocker therapy. Wife who is his primary caretaker secondary to Alzheimer's dementia reports he is doing well in therapy, strength in his legs has improved. 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    HX CATARACT REMOVAL Bilateral 09/2017    HX HERNIA REPAIR  6/1/1985    [place] COLONOSCOPY FLX DX W/COLLJ SPEC WHEN PFRMD  6/30/2010          Allergies   Allergen Reactions    Pcn [Penicillins] Hives     Pt says not allergic    Beef Containing Products Rash      Family History   Problem Relation Age of Onset    Stroke Mother     Heart Disease Mother     Heart Disease Father       Current Outpatient Medications   Medication Sig    cholecalciferol (VITAMIN D3) 1,000 unit cap Take  by mouth daily.  docusate sodium (STOOL SOFTENER) 100 mg capsule Take 100 mg by mouth two (2) times a day.  metoprolol succinate (TOPROL-XL) 25 mg XL tablet Take 0.5 Tabs by mouth nightly.  fluticasone propionate (FLONASE) 50 mcg/actuation nasal spray 2 Sprays by Both Nostrils route daily as needed for Rhinitis.  OTHER Take 1 Tab by mouth daily. Prevagen    pramoxine HCl/menthol (ANTI-ITCH EX) by Apply Externally route daily as needed (itch).  carboxymethylcellulose sodium (REFRESH TEARS OP) Administer 1-2 Drops to both eyes two (2) times a day.  memantine (NAMENDA) 10 mg tablet Take 1 Tab by mouth two (2) times a day.     multivitamin (ONE A DAY) tablet Take 1 Tab by mouth daily.  aspirin delayed-release 81 mg tablet Take 81 mg by mouth nightly.  OTHER Outpatient PT  For deconditioning    acetaminophen/chlorpheniramine (CORICIDIN PO) Take 1 Tab by mouth daily.  ascorbic acid/multivit-min (EMERGEN-C PO) Take 1 Packet by mouth daily as needed (immune boost). No current facility-administered medications for this visit.        Vitals:    08/16/19 1413 08/16/19 1424   BP: 110/70 112/70   Pulse: 68    Resp: 16    SpO2: 98%    Weight: 142 lb 1.6 oz (64.5 kg)    Height: 5' 8\" (1.727 m)      Social History     Socioeconomic History    Marital status:      Spouse name: Not on file    Number of children: 4    Years of education: Not on file    Highest education level: Not on file   Occupational History     Employer: RETIRED   Social Needs    Financial resource strain: Not on file    Food insecurity:     Worry: Not on file     Inability: Not on file    Transportation needs:     Medical: Not on file     Non-medical: Not on file   Tobacco Use    Smoking status: Never Smoker    Smokeless tobacco: Never Used   Substance and Sexual Activity    Alcohol use: No     Alcohol/week: 0.0 standard drinks    Drug use: No    Sexual activity: Not Currently   Lifestyle    Physical activity:     Days per week: Not on file     Minutes per session: Not on file    Stress: Not on file   Relationships    Social connections:     Talks on phone: Not on file     Gets together: Not on file     Attends Yazidism service: Not on file     Active member of club or organization: Not on file     Attends meetings of clubs or organizations: Not on file     Relationship status: Not on file    Intimate partner violence:     Fear of current or ex partner: Not on file     Emotionally abused: Not on file     Physically abused: Not on file     Forced sexual activity: Not on file   Other Topics Concern    Not on file   Social History Narrative    Lives with wife I have reviewed the nurses notes, vitals, problem list, allergy list, medical history, family, social history and medications. Review of Symptoms: Assisted by patient's wife who is the primary caretaker    General: Pt denies excessive weight gain or loss. HEENT: Denies blurred vision, headaches,   Respiratory: Denies shortness of breath, SIMPSON, wheezing or stridor. Cardiovascular: Denies precordial pain, palpitations, edema or PND  Gastrointestinal: Denies poor appetite, indigestion, abdominal pain or blood in stool  Musculoskeletal: Denies pain or swelling from muscles or joints    Physical Exam:      General: Well developed, in no acute distress, cooperative and alert  HEENT: No carotid bruits, no JVD, trach is midline. Neck Supple,   Heart:  Normal S1/S2 negative S3 or S4. Regular, no murmur, gallop or rub.   Respiratory: Clear bilaterally x 4, no wheezing or rales  Abdomen:   Soft, non-tender, no masses, bowel sounds are active.   Extremities:  No edema, normal cap refill, no cyanosis, atraumatic. Neuro: A&Ox1, speech clear, gait stable. Skin: Skin color is normal. No rashes or lesions.  Non diaphoretic  Vascular: 2+ pulses symmetric in all extremities    Cardiographics    ECG: Normal sinus rhythm  Results for orders placed or performed during the hospital encounter of 05/22/19   EKG, 12 LEAD, INITIAL   Result Value Ref Range    Ventricular Rate 75 BPM    Atrial Rate 357 BPM    QRS Duration 138 ms    Q-T Interval 410 ms    QTC Calculation (Bezet) 457 ms    Calculated R Axis 90 degrees    Calculated T Axis 73 degrees    Diagnosis       Atrial fibrillation with PVCs  Right bundle branch block  When compared with ECG of 17-SEP-2017 10:24,  Atrial fibrillation has replaced Sinus rhythm  Confirmed by Neal Langley ([id number]) on 5/22/2019 5:42:09 PM           Cardiology Labs:  Lab Results   Component Value Date/Time    Cholesterol, total 181 05/23/2019 04:43 AM    HDL Cholesterol 46 05/23/2019 04:43 AM LDL, calculated 118 (H) 05/23/2019 04:43 AM    Triglyceride 85 05/23/2019 04:43 AM    CHOL/HDL Ratio 3.9 05/23/2019 04:43 AM       Lab Results   Component Value Date/Time    Sodium 134 (L) 05/23/2019 04:43 AM    Potassium 3.9 05/23/2019 04:43 AM    Chloride 101 05/23/2019 04:43 AM    CO2 27 05/23/2019 04:43 AM    Anion gap 6 05/23/2019 04:43 AM    Glucose 89 05/23/2019 04:43 AM    BUN 13 05/23/2019 04:43 AM    Creatinine 0.85 05/23/2019 04:43 AM    BUN/Creatinine ratio 15 05/23/2019 04:43 AM    GFR est AA >60 05/23/2019 04:43 AM    GFR est non-AA >60 05/23/2019 04:43 AM    Calcium 9.0 05/23/2019 04:43 AM    Bilirubin, total 0.4 05/22/2019 01:11 PM    AST (SGOT) 37 05/22/2019 01:11 PM    Alk. phosphatase 99 05/22/2019 01:11 PM    Protein, total 7.6 05/22/2019 01:11 PM    Albumin 3.5 05/22/2019 01:11 PM    Globulin 4.1 (H) 05/22/2019 01:11 PM    A-G Ratio 0.9 (L) 05/22/2019 01:11 PM    ALT (SGPT) 29 05/22/2019 01:11 PM           Assessment:     Assessment:     Diagnoses and all orders for this visit:    1. PAF (paroxysmal atrial fibrillation) ([facility])  -     AMB POC EKG ROUTINE W/ 12 LEADS, INTER & REP    2. Alzheimer's dementia without behavioral disturbance, unspecified timing of dementia onset    Other orders  -     metoprolol succinate (TOPROL-XL) 25 mg XL tablet; Take 0.5 Tabs by mouth nightly. ICD-10-CM ICD-9-CM    1. PAF (paroxysmal atrial fibrillation) ([facility]) I48.0 427.31 AMB POC EKG ROUTINE W/ 12 LEADS, INTER & REP   2. Alzheimer's dementia without behavioral disturbance, unspecified timing of dementia onset G30.9 331.0     F02.80 294.10      Orders Placed This Encounter    AMB POC EKG ROUTINE W/ 12 LEADS, INTER & REP     Order Specific Question:   Reason for Exam:     Answer:   routine    cholecalciferol (VITAMIN D3) 1,000 unit cap     Sig: Take  by mouth daily.  docusate sodium (STOOL SOFTENER) 100 mg capsule     Sig: Take 100 mg by mouth two (2) times a day.     metoprolol succinate (TOPROL-XL) 25 mg XL tablet     Sig: Take 0.5 Tabs by mouth nightly. Dispense:  45 Tab     Refill:  3        Plan:     Patient presents remaining in sinus rhythm continue low-dose metoprolol, aspirin only as high risk for injury with anticoagulation. Low up in 6 months    Sissy Smith MD    This note was created using voice recognition software. Despite editing, there may be syntax errors.

## 2019-08-16 NOTE — PROGRESS NOTES
1. Have you been to the ER, urgent care clinic since your last visit? Hospitalized since your last visit? NO    2. Have you seen or consulted any other health care providers outside of the [address] [place] [address] since your last visit? Include any pap smears or colon screening.  NO    1 MONTH FOLLOW UP.NO CARDIAC C/O

## 2019-10-21 ENCOUNTER — OFFICE VISIT (OUTPATIENT)
Dept: INTERNAL MEDICINE CLINIC | Age: 84
End: 2019-10-21

## 2019-10-21 VITALS
RESPIRATION RATE: 18 BRPM | HEIGHT: 68 IN | HEART RATE: 65 BPM | TEMPERATURE: 95.8 F | DIASTOLIC BLOOD PRESSURE: 69 MMHG | SYSTOLIC BLOOD PRESSURE: 127 MMHG | BODY MASS INDEX: 21.98 KG/M2 | OXYGEN SATURATION: 97 % | WEIGHT: 145 LBS

## 2019-10-21 DIAGNOSIS — G30.8 ALZHEIMER'S DISEASE OF OTHER ONSET WITHOUT BEHAVIORAL DISTURBANCE: ICD-10-CM

## 2019-10-21 DIAGNOSIS — F02.80 ALZHEIMER'S DISEASE OF OTHER ONSET WITHOUT BEHAVIORAL DISTURBANCE: ICD-10-CM

## 2019-10-21 DIAGNOSIS — I48.0 PAROXYSMAL ATRIAL FIBRILLATION (HCC): Primary | ICD-10-CM

## 2019-10-21 DIAGNOSIS — Z23 ENCOUNTER FOR IMMUNIZATION: ICD-10-CM

## 2019-10-21 NOTE — PROGRESS NOTES
Subjective:  
 
Merced White is a 80 y.o. male who presents for follow up of hypertension, Atrial Fibrillation and dementia. New concerns: finished PT, he is here with his wife his usual.  No complaints, has some mild dementia with minimal behavioral disturbance. Current Outpatient Medications Medication Sig Dispense Refill  cholecalciferol (VITAMIN D3) 1,000 unit cap Take  by mouth daily.  docusate sodium (STOOL SOFTENER) 100 mg capsule Take 100 mg by mouth two (2) times a day.  metoprolol succinate (TOPROL-XL) 25 mg XL tablet Take 0.5 Tabs by mouth nightly. 45 Tab 3  
 OTHER Outpatient PT For deconditioning 1 UNSPECIFIED 0  
 OTHER Take 1 Tab by mouth daily. Prevagen  pramoxine HCl/menthol (ANTI-ITCH EX) by Apply Externally route daily as needed (itch).  carboxymethylcellulose sodium (REFRESH TEARS OP) Administer 1-2 Drops to both eyes two (2) times a day.  acetaminophen/chlorpheniramine (CORICIDIN PO) Take 1 Tab by mouth daily.  ascorbic acid/multivit-min (EMERGEN-C PO) Take 1 Packet by mouth daily as needed (immune boost).  memantine (NAMENDA) 10 mg tablet Take 1 Tab by mouth two (2) times a day. 180 Tab 3  
 multivitamin (ONE A DAY) tablet Take 1 Tab by mouth daily.  aspirin delayed-release 81 mg tablet Take 81 mg by mouth nightly. Allergies Allergen Reactions  Pcn [Penicillins] Hives Pt says not allergic  Beef Containing Products Rash Diet and Lifestyle: nonsmoker Cardiovascular ROS: taking medications as instructed, no medication side effects noted, no TIA's, no chest pain on exertion, no dyspnea on exertion, no swelling of ankles. No bleeding. No focal weakness. Review of Systems, additional: 
Pertinent items are noted in HPI. Patient Active Problem List  
 Diagnosis Date Noted  Leg weakness 05/22/2019  PAF (paroxysmal atrial fibrillation) ([facility] [address] 05/22/2019  Dementia of the Alzheimer's type, with late onset, uncomplicated ([facility] 54/46/2559  Abnormal EKG 09/18/2017  Alzheimer's disease ([facility] 10/26/2015  Allergic rhinitis 11/13/2014  Stenosis of carotid artery 10/18/2013  BPH (benign prostatic hyperplasia) 08/16/2011 Social History Tobacco Use  Smoking status: Never Smoker  Smokeless tobacco: Never Used Substance Use Topics  Alcohol use: No  
  Alcohol/week: 0.0 standard drinks Lab Results Component Value Date/Time GFR est non-AA >60 05/23/2019 04:43 AM  
 GFR est AA >60 05/23/2019 04:43 AM  
 Creatinine 0.85 05/23/2019 04:43 AM  
 BUN 13 05/23/2019 04:43 AM  
 Sodium 134 (L) 05/23/2019 04:43 AM  
 Potassium 3.9 05/23/2019 04:43 AM  
 Chloride 101 05/23/2019 04:43 AM  
 CO2 27 05/23/2019 04:43 AM  
 Magnesium 2.5 (H) 09/17/2017 11:48 AM  
 
Lab Results Component Value Date/Time TSH 2.71 05/23/2019 04:43 AM  
 T4, Free 1.2 05/23/2019 04:43 AM  
  
Lab Results Component Value Date/Time Glucose 89 05/23/2019 04:43 AM  
   
 
 
 
Objective:  
 
Physical exam significant for the following:  
 
chadwick CALDERA NAD Visit Vitals /69 (BP 1 Location: Left arm, BP Patient Position: Sitting) Pulse 65 Temp 95.8 °F (35.4 °C) (Oral) Resp 18 Ht 5' 8\" (1.727 m) Wt 145 lb (65.8 kg) SpO2 97% BMI 22.05 kg/m² Appearance: alert, well appearing, and in no distress and elderly. General exam: CVS exam BP noted to be well controlled today in office, S1, S2 normal, no gallop, no murmur, chest clear, no JVD, no HSM, no edema. .  
Assessment/Plan: PAF well controlled, stable PAF cont ASA betablocker Dementia stable Chronic Conditions Addressed Today 1. Alzheimer's disease ([facility] Relevant Orders ADMIN INFLUENZA VIRUS VAC Acute Diagnoses Addressed Today Paroxysmal atrial fibrillation (HCC)    -  Primary Relevant Orders  ADMIN INFLUENZA VIRUS VAC  
 Encounter for immunization Relevant Orders INFLUENZA VIRUS VAC QUAD,SPLIT,PRESV FREE SYRINGE IM (Completed) ADMIN INFLUENZA VIRUS VAC Orders Placed This Encounter  INFLUENZA VIRUS VACCINE QUADRIVALENT, PRESERVATIVE FREE SYRINGE (80209)  ADMIN INFLUENZA VIRUS VAC Follow-up and Dispositions · Return in about 3 months (around 1/21/2020) for medicare annual.

## 2019-10-21 NOTE — PROGRESS NOTES
Chief Complaint Patient presents with  Dementia  
  follow up I have reviewed the patient's medical history in detail and updated the computerized patient record. Health Maintenance reviewed. 1. Have you been to the ER, urgent care clinic since your last visit? Hospitalized since your last visit?no 2. Have you seen or consulted any other health care providers outside of the [address] [place] [address] [place] since your last visit? Include any pap smears or colon screening. No 
 
Pt stated he/she does have an Advance Directive Fall Risk Assessment, last 12 mths 10/21/2019 Able to walk? Yes Fall in past 12 months? No  
Fall with injury? -  
Number of falls in past 12 months - Fall Risk Score -  
 
 
3 most recent PHQ Screens 10/21/2019 Little interest or pleasure in doing things Several days Feeling down, depressed, irritable, or hopeless Several days Total Score PHQ 2 2 Abuse Screening Questionnaire 4/25/2019 Do you ever feel afraid of your partner? Jojo Shetty Are you in a relationship with someone who physically or mentally threatens you? Jojo Shetty Is it safe for you to go home? Y  
 
 

## 2020-01-01 ENCOUNTER — TELEPHONE (OUTPATIENT)
Dept: INTERNAL MEDICINE CLINIC | Age: 85
End: 2020-01-01

## 2020-01-01 ENCOUNTER — OFFICE VISIT (OUTPATIENT)
Dept: INTERNAL MEDICINE CLINIC | Age: 85
End: 2020-01-01
Payer: MEDICARE

## 2020-01-01 ENCOUNTER — DOCUMENTATION ONLY (OUTPATIENT)
Dept: INTERNAL MEDICINE CLINIC | Age: 85
End: 2020-01-01

## 2020-01-01 ENCOUNTER — TELEPHONE (OUTPATIENT)
Dept: CARDIOLOGY CLINIC | Age: 85
End: 2020-01-01

## 2020-01-01 ENCOUNTER — VIRTUAL VISIT (OUTPATIENT)
Dept: INTERNAL MEDICINE CLINIC | Age: 85
End: 2020-01-01

## 2020-01-01 VITALS
BODY MASS INDEX: 21.98 KG/M2 | WEIGHT: 145 LBS | SYSTOLIC BLOOD PRESSURE: 139 MMHG | RESPIRATION RATE: 16 BRPM | OXYGEN SATURATION: 95 % | TEMPERATURE: 97.6 F | DIASTOLIC BLOOD PRESSURE: 75 MMHG | HEART RATE: 73 BPM | HEIGHT: 68 IN

## 2020-01-01 VITALS
TEMPERATURE: 97.3 F | HEIGHT: 68 IN | SYSTOLIC BLOOD PRESSURE: 166 MMHG | HEART RATE: 75 BPM | WEIGHT: 145 LBS | OXYGEN SATURATION: 96 % | RESPIRATION RATE: 16 BRPM | BODY MASS INDEX: 21.98 KG/M2 | DIASTOLIC BLOOD PRESSURE: 81 MMHG

## 2020-01-01 VITALS — TEMPERATURE: 102 F

## 2020-01-01 DIAGNOSIS — R50.9 FEVER, UNSPECIFIED FEVER CAUSE: Primary | ICD-10-CM

## 2020-01-01 DIAGNOSIS — I48.0 PAROXYSMAL ATRIAL FIBRILLATION (HCC): ICD-10-CM

## 2020-01-01 DIAGNOSIS — Z23 ENCOUNTER FOR IMMUNIZATION: ICD-10-CM

## 2020-01-01 DIAGNOSIS — R29.898 WEAKNESS OF BOTH LOWER EXTREMITIES: ICD-10-CM

## 2020-01-01 DIAGNOSIS — I10 ESSENTIAL HYPERTENSION: ICD-10-CM

## 2020-01-01 DIAGNOSIS — I48.0 PAF (PAROXYSMAL ATRIAL FIBRILLATION) (HCC): ICD-10-CM

## 2020-01-01 DIAGNOSIS — G30.8 ALZHEIMER'S DISEASE OF OTHER ONSET WITHOUT BEHAVIORAL DISTURBANCE: ICD-10-CM

## 2020-01-01 DIAGNOSIS — R26.9 GAIT ABNORMALITY: Primary | ICD-10-CM

## 2020-01-01 DIAGNOSIS — F02.80 LATE ONSET ALZHEIMER'S DISEASE WITHOUT BEHAVIORAL DISTURBANCE (HCC): Primary | ICD-10-CM

## 2020-01-01 DIAGNOSIS — G30.1 LATE ONSET ALZHEIMER'S DISEASE WITHOUT BEHAVIORAL DISTURBANCE (HCC): Primary | ICD-10-CM

## 2020-01-01 DIAGNOSIS — Z23 NEEDS FLU SHOT: ICD-10-CM

## 2020-01-01 DIAGNOSIS — G30.1 LATE ONSET ALZHEIMER'S DISEASE WITHOUT BEHAVIORAL DISTURBANCE (HCC): ICD-10-CM

## 2020-01-01 DIAGNOSIS — F02.80 LATE ONSET ALZHEIMER'S DISEASE WITHOUT BEHAVIORAL DISTURBANCE (HCC): ICD-10-CM

## 2020-01-01 DIAGNOSIS — I73.9 PERIPHERAL VASCULAR DISEASE (HCC): ICD-10-CM

## 2020-01-01 DIAGNOSIS — F02.80 ALZHEIMER'S DISEASE OF OTHER ONSET WITHOUT BEHAVIORAL DISTURBANCE: ICD-10-CM

## 2020-01-01 PROCEDURE — G0008 ADMIN INFLUENZA VIRUS VAC: HCPCS | Performed by: FAMILY MEDICINE

## 2020-01-01 PROCEDURE — 99214 OFFICE O/P EST MOD 30 MIN: CPT | Performed by: FAMILY MEDICINE

## 2020-01-01 PROCEDURE — G8420 CALC BMI NORM PARAMETERS: HCPCS | Performed by: FAMILY MEDICINE

## 2020-01-01 PROCEDURE — G8427 DOCREV CUR MEDS BY ELIG CLIN: HCPCS | Performed by: FAMILY MEDICINE

## 2020-01-01 PROCEDURE — 1101F PT FALLS ASSESS-DOCD LE1/YR: CPT | Performed by: FAMILY MEDICINE

## 2020-01-01 PROCEDURE — G8432 DEP SCR NOT DOC, RNG: HCPCS | Performed by: FAMILY MEDICINE

## 2020-01-01 PROCEDURE — 90694 VACC AIIV4 NO PRSRV 0.5ML IM: CPT | Performed by: FAMILY MEDICINE

## 2020-01-01 PROCEDURE — G8536 NO DOC ELDER MAL SCRN: HCPCS | Performed by: FAMILY MEDICINE

## 2020-01-01 RX ORDER — MEMANTINE HYDROCHLORIDE 10 MG/1
10 TABLET ORAL 2 TIMES DAILY
Qty: 180 TAB | Refills: 3 | Status: SHIPPED | OUTPATIENT
Start: 2020-01-01 | End: 2021-01-01 | Stop reason: ALTCHOICE

## 2020-01-01 RX ORDER — MEMANTINE HYDROCHLORIDE 10 MG/1
10 TABLET ORAL 2 TIMES DAILY
Qty: 180 TAB | Refills: 3 | Status: SHIPPED | OUTPATIENT
Start: 2020-01-01 | End: 2020-01-01 | Stop reason: SDUPTHER

## 2020-01-01 RX ORDER — FLUTICASONE PROPIONATE 50 MCG
2 SPRAY, SUSPENSION (ML) NASAL DAILY
Qty: 1 BOTTLE | Refills: 5 | Status: SHIPPED | OUTPATIENT
Start: 2020-01-01 | End: 2020-01-01 | Stop reason: SDUPTHER

## 2020-01-01 RX ORDER — MEMANTINE HYDROCHLORIDE 10 MG/1
10 TABLET ORAL 2 TIMES DAILY
Qty: 180 TAB | Refills: 3 | OUTPATIENT
Start: 2020-01-01

## 2020-01-01 RX ORDER — FLUTICASONE PROPIONATE 50 MCG
2 SPRAY, SUSPENSION (ML) NASAL
Qty: 1 BOTTLE | OUTPATIENT
Start: 2020-01-01

## 2020-01-01 RX ORDER — METOPROLOL SUCCINATE 25 MG/1
12.5 TABLET, EXTENDED RELEASE ORAL
Qty: 45 TAB | Refills: 2 | Status: SHIPPED | OUTPATIENT
Start: 2020-01-01 | End: 2021-01-01

## 2020-01-01 RX ORDER — FLUTICASONE PROPIONATE 50 MCG
2 SPRAY, SUSPENSION (ML) NASAL DAILY
Qty: 3 BOTTLE | Refills: 3 | Status: SHIPPED | OUTPATIENT
Start: 2020-01-01

## 2020-01-01 RX ORDER — DONEPEZIL HYDROCHLORIDE 5 MG/1
5 TABLET, FILM COATED ORAL
Qty: 90 TAB | Refills: 1 | Status: SHIPPED | OUTPATIENT
Start: 2020-01-01 | End: 2021-01-01 | Stop reason: ALTCHOICE

## 2020-01-01 RX ORDER — AZITHROMYCIN 250 MG/1
250 TABLET, FILM COATED ORAL SEE ADMIN INSTRUCTIONS
Qty: 6 TAB | Refills: 0 | Status: SHIPPED | OUTPATIENT
Start: 2020-01-01 | End: 2020-01-01 | Stop reason: ALTCHOICE

## 2020-01-15 ENCOUNTER — OFFICE VISIT (OUTPATIENT)
Dept: INTERNAL MEDICINE CLINIC | Age: 85
End: 2020-01-15

## 2020-01-15 VITALS
RESPIRATION RATE: 16 BRPM | DIASTOLIC BLOOD PRESSURE: 70 MMHG | BODY MASS INDEX: 21.98 KG/M2 | WEIGHT: 145 LBS | HEART RATE: 68 BPM | SYSTOLIC BLOOD PRESSURE: 121 MMHG | HEIGHT: 68 IN | TEMPERATURE: 97.6 F | OXYGEN SATURATION: 98 %

## 2020-01-15 DIAGNOSIS — Z13.39 SCREENING FOR ALCOHOLISM: ICD-10-CM

## 2020-01-15 DIAGNOSIS — Z13.31 SCREENING FOR DEPRESSION: ICD-10-CM

## 2020-01-15 DIAGNOSIS — Z00.00 MEDICARE ANNUAL WELLNESS VISIT, SUBSEQUENT: ICD-10-CM

## 2020-01-15 NOTE — PROGRESS NOTES
Chief Complaint Patient presents with Miguel Quiñones Annual Wellness Visit Clock Draw Test Done I have reviewed the patient's medical history in detail and updated the computerized patient record. Health Maintenance reviewed. 1. Have you been to the ER, urgent care clinic since your last visit? Hospitalized since your last visit?no 2. Have you seen or consulted any other health care providers outside of the [address] [place] [address] since your last visit? Include any pap smears or colon screening. No 
 
Pt stated he/she does have an Advance Directive Fall Risk Assessment, last 12 mths 1/15/2020 Able to walk? Yes Fall in past 12 months? No  
Fall with injury? -  
Number of falls in past 12 months - Fall Risk Score -  
 
 
3 most recent PHQ Screens 1/15/2020 Little interest or pleasure in doing things Several days Feeling down, depressed, irritable, or hopeless Several days Total Score PHQ 2 2 Abuse Screening Questionnaire 4/25/2019 Do you ever feel afraid of your partner? Roland Ser Are you in a relationship with someone who physically or mentally threatens you? Roland Ser Is it safe for you to go home? Y  
 
 
ADL Assessment 1/15/2020 Feeding yourself No Help Needed Getting from bed to chair No Help Needed Getting dressed No Help Needed Bathing or showering No Help Needed Walk across the room (includes cane/walker) No Help Needed Using the telphone No Help Needed Taking your medications Help Needed Preparing meals No Help Needed Managing money (expenses/bills) Help Needed Moderately strenuous housework (laundry) Help Needed Shopping for personal items (toiletries/medicines) Help Needed Shopping for groceries Help Needed Driving Help Needed Climbing a flight of stairs No Help Needed Getting to places beyond walking distances Help Needed

## 2020-01-15 NOTE — PROGRESS NOTES
This is the Subsequent Medicare Annual Wellness Exam, performed 12 months or more after the Initial AWV or the last Subsequent AWV I have reviewed the patient's medical history in detail and updated the computerized patient record. History Much of Hx per wife. Wife thinks he is  enough to discuss and understand advanced directives. He certainly understands yes no question Patient Active Problem List  
Diagnosis Code  BPH (benign prostatic hyperplasia) N40.0  Stenosis of carotid artery I65.29  
 Allergic rhinitis J30.9  Alzheimer's disease ([facility] [address] G30.9, F02.80  Abnormal EKG R94.31  
 Dementia of the Alzheimer's type, with late onset, uncomplicated ([facility]) S68.5, F02.80  Leg weakness R29.898  
 PAF (paroxysmal atrial fibrillation) ([facility]) I48.0 Past Medical History:  
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  
Procedure Laterality Date  HX CAROTID ENDARTERECTOMY Rt.  
 HX CATARACT REMOVAL Bilateral 09/2017  HX HERNIA REPAIR  6/1/1985  [place] COLONOSCOPY FLX DX W/COLLJ SPEC WHEN PFRMD  6/30/2010 Current Outpatient Medications Medication Sig Dispense Refill  cholecalciferol (VITAMIN D3) 1,000 unit cap Take  by mouth daily.  docusate sodium (STOOL SOFTENER) 100 mg capsule Take 100 mg by mouth two (2) times a day.  metoprolol succinate (TOPROL-XL) 25 mg XL tablet Take 0.5 Tabs by mouth nightly. 45 Tab 3  
 OTHER Outpatient PT For deconditioning 1 UNSPECIFIED 0  
 OTHER Take 1 Tab by mouth daily. Prevagen  pramoxine HCl/menthol (ANTI-ITCH EX) by Apply Externally route daily as needed (itch).  carboxymethylcellulose sodium (REFRESH TEARS OP) Administer 1-2 Drops to both eyes two (2) times a day.  acetaminophen/chlorpheniramine (CORICIDIN PO) Take 1 Tab by mouth daily.  ascorbic acid/multivit-min (EMERGEN-C PO) Take 1 Packet by mouth daily as needed (immune boost).  memantine (NAMENDA) 10 mg tablet Take 1 Tab by mouth two (2) times a day. 180 Tab 3  
 multivitamin (ONE A DAY) tablet Take 1 Tab by mouth daily.  aspirin delayed-release 81 mg tablet Take 81 mg by mouth nightly. Allergies Allergen Reactions  Pcn [Penicillins] Hives Pt says not allergic  Beef Containing Products Rash Family History Problem Relation Age of Onset  Stroke Mother  Heart Disease Mother  Heart Disease Father Social History Tobacco Use  Smoking status: Never Smoker  Smokeless tobacco: Never Used Substance Use Topics  Alcohol use: No  
  Alcohol/week: 0.0 standard drinks Depression Risk Factor Screening:  
 
3 most recent PHQ Screens 1/15/2020 Little interest or pleasure in doing things Several days Feeling down, depressed, irritable, or hopeless Several days Total Score PHQ 2 2 Alcohol Risk Factor Screening (MALE > 65): Do you average more 1 drink per night or more than 7 drinks a week: No 
 
In the past three months have you have had more than 4 drinks containing alcohol on one occasion: No 
 
 
Functional Ability and Level of Safety:  
Hearing: Hearing is good. Activities of Daily Living: The home contains: grab bars Patient does total self care Wife does driving, cooking, money Ambulation: with no difficulty Fall Risk: 
Fall Risk Assessment, last 12 mths 1/15/2020 Able to walk? Yes Fall in past 12 months? No  
Fall with injury? -  
Number of falls in past 12 months - Fall Risk Score -  
 
 
Abuse Screen: 
Patient is not abused Cognitive Screening Has your family/caregiver stated any concerns about your memory: yes - he's forgetfull Cognitive Screening: Abnormal - Clock Drawing Test 
 
Patient Care Team  
 Patient Care Team: 
Saritha Carpenter MD as PCP - [facility]) Sarith[facility] Carpenter [facility] Jeovany Mendoza MD as Physician (Cardiology) [facility] urology Tena Lozano Assessment/Plan Education and counseling provided: 
Are appropriate based on today's review and evaluation Diagnoses and all orders for this visit: 
 
1. Medicare annual wellness visit, subsequent 2. Screening for alcoholism -     [place] ANNUAL ALCOHOL SCREEN 15 MIN 3. Screening for depression 
-     [address] Acute Diagnoses Addressed Today Medicare annual wellness visit, subsequent Screening for alcoholism Relevant Orders [place] ANNUAL ALCOHOL SCREEN 15 MIN Screening for depression Relevant Orders DEPRESSION SCREEN ANNUAL  
  
 
  ICD-10-CM ICD-9-CM 1. Medicare annual wellness visit, subsequent Z00.00 V70.0 2. Screening for alcoholism Z13.39 V79.1 [place] ANNUAL ALCOHOL SCREEN 15 MIN 3. Screening for depression Z13.31 V79.0 [address] Orders Placed This Encounter  Depression Screen Annual  
 Annual  Alcohol Screen 15 min () Health Maintenance Due Topic Date Due  GLAUCOMA SCREENING Q2Y  04/26/1999  MEDICARE YEARLY EXAM  11/22/2019

## 2020-01-15 NOTE — PATIENT INSTRUCTIONS
Advance Care Planning: Care Instructions Your Care Instructions It can be hard to live with an illness that cannot be cured. But if your health is getting worse, you may want to make decisions about end-of-life care. Planning for the end of your life does not mean that you are giving up. It is a way to make sure that your wishes are met. Clearly stating your wishes can make it easier for your loved ones. Making plans while you are still able may also ease your mind and make your final days less stressful and more meaningful. Follow-up care is a key part of your treatment and safety. Be sure to make and go to all appointments, and call your doctor if you are having problems. It's also a good idea to know your test results and keep a list of the medicines you take. What can you do to plan for the end of life? · You can bring these issues up with your doctor. You do not need to wait until your doctor starts the conversation. You might start with \"I would not be willing to live with . Sudie Mar Sudie Mar Sudie Mar \" When you complete this sentence it helps your doctor understand your wishes. · Talk openly and honestly with your doctor. This is the best way to understand the decisions you will need to make as your health changes. Know that you can always change your mind. · Ask your doctor about commonly used life-support measures. These include tube feedings, breathing machines, and fluids given through a vein (IV). Understanding these treatments will help you decide whether you want them. · You may choose to have these life-supporting treatments for a limited time. This allows a trial period to see whether they will help you. You may also decide that you want your doctor to take only certain measures to keep you alive. It is important to spell out these conditions so that your doctor and family understand them. · Talk to your doctor about how long you are likely to live.  He or she may be able to give you an idea of what usually happens with your specific illness. · Think about preparing papers that state your wishes. This way there will not be any confusion about what you want. You can change your instructions at any time. Which papers should you prepare? Advance directives are legal papers that tell doctors how you want to be cared for at the end of your life. You do not need a  to write these papers. Ask your doctor or your state [facility] department for information on how to write your advance directives. They may have the forms for each of these types of papers. Make sure your doctor has a copy of these on file, and give a copy to a family member or close friend. · Consider a do-not-resuscitate order (DNR). This order asks that no extra treatments be done if your heart stops or you stop breathing. Extra treatments may include cardiopulmonary resuscitation (CPR), electrical shock to restart your heart, or a machine to breathe for you. If you decide to have a DNR order, ask your doctor to explain and write it. Place the order in your home where everyone can easily see it. · Consider a living will. A living will explains your wishes about life support and other treatments at the end of your life if you become unable to speak for yourself. Living jacobson tell doctors to use or not use treatments that would keep you alive. You must have one or two witnesses or a notary present when you sign this form. · Consider a durable power of  for health care. This allows you to name a person to make decisions about your care if you are not able to. Most people ask a close friend or family member. Talk to this person about the kinds of treatments you want and those that you do not want. Make sure this person understands your wishes. These legal papers are simple to change. Tell your doctor what you want to change, and ask him or her to make a note in your medical file.  Give your family updated copies of the papers. Where can you learn more? Go to http://frandy-jose.info/. Enter P184 in the search box to learn more about \"Advance Care Planning: Care Instructions. \" Current as of: April 1, 2019 Content Version: 12.2 © 8809-6943 [employer]. Care instructions adapted under license by [employer] (which disclaims liability or warranty for this information). If you have questions about a medical condition or this instruction, always ask your healthcare professional. [address] any warranty or liability for your use of this information. Advance Directives: Care Instructions Your Care Instructions An advance directive is a legal way to state your wishes at the end of your life. It tells your family and your doctor what to do if you can no longer say what you want. There are two main types of advance directives. You can change them any time that your wishes change. · A living will tells your family and your doctor your wishes about life support and other treatment. · A durable power of  for health care lets you name a person to make treatment decisions for you when you can't speak for yourself. This person is called a health care agent. If you do not have an advance directive, decisions about your medical care may be made by a doctor or a  who doesn't know you. It may help to think of an advance directive as a gift to the people who care for you. If you have one, they won't have to make tough decisions by themselves. Follow-up care is a key part of your treatment and safety. Be sure to make and go to all appointments, and call your doctor if you are having problems. It's also a good idea to know your test results and keep a list of the medicines you take. How can you care for yourself at home? · Discuss your wishes with your loved ones and your doctor. This way, there are no surprises. · Many states have a unique form. Or you might use a universal form that has been approved by many states. This kind of form can sometimes be completed and stored online. Your electronic copy will then be available wherever you have a connection to the Internet. In most cases, doctors will respect your wishes even if you have a form from a different state. · You don't need a  to do an advance directive. But you may want to get legal advice. · Think about these questions when you prepare an advance directive: 
? Who do you want to make decisions about your medical care if you are not able to? Many people choose a family member or close friend. ? Do you know enough about life support methods that might be used? If not, talk to your doctor so you understand. ? What are you most afraid of that might happen? You might be afraid of having pain, losing your independence, or being kept alive by machines. ? Where would you prefer to die? Choices include your home, a hospital, or a nursing home. ? Would you like to have information about hospice care to support you and your family? ? Do you want to donate organs when you die? ? Do you want certain Orthodox practices performed before you die? If so, put your wishes in the advance directive. · Read your advance directive every year, and make changes as needed. When should you call for help? Be sure to contact your doctor if you have any questions. Where can you learn more? Go to http://frandy-jose.info/. Enter R264 in the search box to learn more about \"Advance Directives: Care Instructions. \" Current as of: April 1, 2019 Content Version: 12.2 © 4534-7435 [employer]. Care instructions adapted under license by [employer] (which disclaims liability or warranty for this information).  If you have questions about a medical condition or this instruction, always ask your healthcare professional. [employer] disclaims any warranty or liability for your use of this information. Medicare Wellness Visit, Male The best way to live healthy is to have a lifestyle where you eat a well-balanced diet, exercise regularly, limit alcohol use, and quit all forms of tobacco/nicotine, if applicable. Regular preventive services are another way to keep healthy. Preventive services (vaccines, screening tests, monitoring & exams) can help personalize your care plan, which helps you manage your own care. Screening tests can find health problems at the earliest stages, when they are easiest to treat. [facility] follows the current, evidence-based guidelines published by the [facility] States Watson Rich ([facility]) when recommending preventive services for our patients. Because we follow these guidelines, sometimes recommendations change over time as research supports it. (For example, a prostate screening blood test is no longer routinely recommended for men with no symptoms). Of course, you and your doctor may decide to screen more often for some diseases, based on your risk and co-morbidities (chronic disease you are already diagnosed with). Preventive services for you include: - Medicare offers their members a free annual wellness visit, which is time for you and your primary care provider to discuss and plan for your preventive service needs. Take advantage of this benefit every year! 
-All adults over age 72 should receive the recommended pneumonia vaccines. Current USPSTF guidelines recommend a series of two vaccines for the best pneumonia protection.  
-All adults should have a flu vaccine yearly and tetanus vaccine every 10 years. 
-All adults age 48 and older should receive the shingles vaccines (series of two vaccines). -All adults age 38-68 who are overweight should have a diabetes screening test once every three years. -Other screening tests & preventive services for persons with diabetes include: an eye exam to screen for diabetic retinopathy, a kidney function test, a foot exam, and stricter control over your cholesterol.  
-Cardiovascular screening for adults with routine risk involves an electrocardiogram (ECG) at intervals determined by the provider.  
-Colorectal cancer screening should be done for adults age 54-65 with no increased risk factors for colorectal cancer. There are a number of acceptable methods of screening for this type of cancer. Each test has its own benefits and drawbacks. Discuss with your provider what is most appropriate for you during your annual wellness visit. The different tests include: colonoscopy (considered the best screening method), a fecal occult blood test, a fecal DNA test, and sigmoidoscopy. 
-All adults born between [facility] should be screened once for Hepatitis C. 
-An Abdominal Aortic Aneurysm (AAA) Screening is recommended for men age 73-68 who has ever smoked in their lifetime. Here is a list of your current Health Maintenance items (your personalized list of preventive services) with a due date: 
Health Maintenance Due Topic Date Due  Glaucoma Screening   04/26/1999 [facility] Annual Well Visit  11/22/2019

## 2020-03-04 ENCOUNTER — OFFICE VISIT (OUTPATIENT)
Dept: CARDIOLOGY CLINIC | Age: 85
End: 2020-03-04

## 2020-03-04 VITALS
DIASTOLIC BLOOD PRESSURE: 82 MMHG | BODY MASS INDEX: 22.58 KG/M2 | HEART RATE: 64 BPM | SYSTOLIC BLOOD PRESSURE: 170 MMHG | HEIGHT: 68 IN | RESPIRATION RATE: 18 BRPM | OXYGEN SATURATION: 92 % | WEIGHT: 149 LBS

## 2020-03-04 DIAGNOSIS — F02.80 ALZHEIMER'S DEMENTIA WITHOUT BEHAVIORAL DISTURBANCE, UNSPECIFIED TIMING OF DEMENTIA ONSET: ICD-10-CM

## 2020-03-04 DIAGNOSIS — I10 ESSENTIAL HYPERTENSION: ICD-10-CM

## 2020-03-04 DIAGNOSIS — I48.0 PAF (PAROXYSMAL ATRIAL FIBRILLATION) (HCC): Primary | ICD-10-CM

## 2020-03-04 DIAGNOSIS — G30.9 ALZHEIMER'S DEMENTIA WITHOUT BEHAVIORAL DISTURBANCE, UNSPECIFIED TIMING OF DEMENTIA ONSET: ICD-10-CM

## 2020-03-04 DIAGNOSIS — I65.29 STENOSIS OF CAROTID ARTERY, UNSPECIFIED LATERALITY: ICD-10-CM

## 2020-03-04 RX ORDER — PREDNISOLONE ACETATE 10 MG/ML
SUSPENSION/ DROPS OPHTHALMIC
COMMUNITY
Start: 2020-02-09

## 2020-03-04 NOTE — PROGRESS NOTES
1. Have you been to the ER, urgent care clinic since your last visit? Hospitalized since your last visit? No 
 
2. Have you seen or consulted any other health care providers outside of the [address] [place] [address] since your last visit? Include any pap smears or colon screening.  No

## 2020-03-04 NOTE — PROGRESS NOTES
Marbella Franklin, FNP-BC Subjective/HPI:  
 
Meenu Shah is a 80 y.o. male is here for routine f/u. He has a PMHx of PAF maintaining SR with Metoprolol. Taking ASA d/t dementia/fall risk issues. Patients wife provides ROS and reports for 2-3 months no indications of afib such is lightheadedness/near syncope. His dementia has worsened. PCP Provider Saskia Beckman MD 
 
Past Medical History:  
Diagnosis Date  BPH (benign prostatic hypertrophy)  Cancer ([facility] BASAL ON FACE  Dementia ([facility]  ECG abnormal 04/2011 IVCD, RBBB  Hypercholesterolemia  S/P carotid endarterectomy Right Allergies Allergen Reactions  Pcn [Penicillins] Hives Pt says not allergic  Beef Containing Products Rash Outpatient Encounter Medications as of 3/4/2020 Medication Sig Dispense Refill  prednisoLONE acetate (PRED FORTE) 1 % ophthalmic suspension PLACE 1 DROP INTO BOTH EYES TWICE A DAY IF NEEDED  cholecalciferol (VITAMIN D3) 1,000 unit cap Take 2,000 Units by mouth daily.  docusate sodium (STOOL SOFTENER) 100 mg capsule Take 100 mg by mouth two (2) times a day.  metoprolol succinate (TOPROL-XL) 25 mg XL tablet Take 0.5 Tabs by mouth nightly. 45 Tab 3  
 OTHER Take 1 Tab by mouth daily. Prevagen  pramoxine HCl/menthol (ANTI-ITCH EX) by Apply Externally route daily as needed (itch).  carboxymethylcellulose sodium (REFRESH TEARS OP) Administer 1-2 Drops to both eyes two (2) times a day.  acetaminophen/chlorpheniramine (CORICIDIN PO) Take 1 Tab by mouth daily.  memantine (NAMENDA) 10 mg tablet Take 1 Tab by mouth two (2) times a day. 180 Tab 3  
 multivitamin (ONE A DAY) tablet Take 1 Tab by mouth daily.  aspirin delayed-release 81 mg tablet Take 81 mg by mouth nightly.  OTHER Outpatient PT For deconditioning 1 UNSPECIFIED 0  
 ascorbic acid/multivit-min (EMERGEN-C PO) Take 1 Packet by mouth daily as needed (immune boost). No facility-administered encounter medications on file as of 3/4/2020. Review of Symptoms: 
 
Review of Systems Constitutional: Negative for malaise/fatigue. HENT: Negative for nosebleeds. Eyes: Negative for blurred vision. Respiratory: Negative for shortness of breath. Cardiovascular: Negative for chest pain and palpitations. Gastrointestinal: Negative for melena. Musculoskeletal: Negative for myalgias. Neurological: Negative for dizziness. Psychiatric/Behavioral: Positive for memory loss. Physical Exam:   
 
General: Well developed, in no acute distress, cooperative and alert HEENT: No carotid bruits, no JVD, trach is midline. Neck Supple, PEERL, EOM intact. Heart:  reg rate and rhythm; normal S1/S2; +JOY, no gallops or rubs. Respiratory: Clear bilaterally x 4, no wheezing or rales Abdomen:   Soft, non-tender, no distention, no masses. + BS. Extremities:  Normal cap refill, no cyanosis, atraumatic. No edema. Neuro: alert, speech clear, gait stable. Skin: Skin color is normal. No rashes or lesions. Non diaphoretic Vascular: 2+ pulses symmetric in all extremities Visit Vitals /82 (BP 1 Location: Right arm) Pulse 64 Resp 18 Ht 5' 8\" (1.727 m) Wt 149 lb (67.6 kg) SpO2 92% BMI 22.66 kg/m² ECG: SR, RBBB Cardiology Labs: 
 
Lab Results Component Value Date/Time Cholesterol, total 181 05/23/2019 04:43 AM  
 HDL Cholesterol 46 05/23/2019 04:43 AM  
 LDL, calculated 118 (H) 05/23/2019 04:43 AM  
 LDL, calculated 98 10/26/2016 09:09 AM  
 LDL, calculated 121 (H) 06/27/2014 08:05 AM  
 VLDL, calculated 17 05/23/2019 04:43 AM  
 CHOL/HDL Ratio 3.9 05/23/2019 04:43 AM  
 
 
Lab Results Component Value Date/Time Hemoglobin A1c 5.7 (H) 10/08/2013 12:07 PM  
 
 
Lab Results Component Value Date/Time  Sodium 134 (L) 05/23/2019 04:43 AM  
 Potassium 3.9 05/23/2019 04:43 AM  
 Chloride 101 05/23/2019 04:43 AM  
 CO2 27 05/23/2019 04:43 AM  
 Glucose 89 05/23/2019 04:43 AM  
 BUN 13 05/23/2019 04:43 AM  
 Creatinine 0.85 05/23/2019 04:43 AM  
 BUN/Creatinine ratio 15 05/23/2019 04:43 AM  
 GFR est AA >60 05/23/2019 04:43 AM  
 GFR est non-AA >60 05/23/2019 04:43 AM  
 Calcium 9.0 05/23/2019 04:43 AM  
 Anion gap 6 05/23/2019 04:43 AM  
 Bilirubin, total 0.4 05/22/2019 01:11 PM  
 ALT (SGPT) 29 05/22/2019 01:11 PM  
 AST (SGOT) 37 05/22/2019 01:11 PM  
 Alk. phosphatase 99 05/22/2019 01:11 PM  
 Protein, total 7.6 05/22/2019 01:11 PM  
 Albumin 3.5 05/22/2019 01:11 PM  
 Globulin 4.1 (H) 05/22/2019 01:11 PM  
 A-G Ratio 0.9 (L) 05/22/2019 01:11 PM  
 
 
 
 Assessment: ICD-10-CM ICD-9-CM 1. PAF (paroxysmal atrial fibrillation) ([facility]) I48.0 427.31 AMB POC EKG ROUTINE W/ 12 LEADS, INTER & REP 2. Stenosis of carotid artery, unspecified laterality I65.29 433.10   
3. Essential hypertension I10 401.9 4. Alzheimer's dementia without behavioral disturbance, unspecified timing of dementia onset ([facility] G30.9 331.0 F02.80 294.10 Plan: 1. PAF (paroxysmal atrial fibrillation) ([facility] Maintaining SR on Metoprolol. Taking ASA only due to dementia/fall risk. Cont current care. - AMB POC EKG ROUTINE W/ 12 LEADS, INTER & REP 2. Stenosis of carotid artery, unspecified laterality Asymptomatic, no bruit noted. 3. Essential hypertension BP elevated today on initial assessment. Decreased with time resting. Monitored at home and by PCP and has been normotensive. No changes today. 4. Alzheimer's dementia without behavioral disturbance, unspecified timing of dementia onset ([facility] Worsening memory loss per wife. Gave her info today on the Alzheimer's association Edison Shannon MD

## 2020-04-22 NOTE — PROGRESS NOTES
Kimberlee Rios is a 80 y.o. male who was phone evaluated on 4/22/2020. Consent: 
He and/or his healthcare decision maker is aware that this patient-initiated Telehealth encounter is a billable service, with coverage as determined by his insurance carrier. He is aware that he may receive a bill and has provided verbal consent to proceed: Yes I was at home while conducting this encounter. Assessment & Plan: ICD-10-CM ICD-9-CM 1. Fever, unspecified fever cause R50.9 780.60 azithromycin (ZITHROMAX) 250 mg tablet 2. Paroxysmal atrial fibrillation (HCC) I48.0 427.31   
3. Alzheimer's disease of other onset without behavioral disturbance ([facility] G30.8 331.0 F02.80 294.10 Orders Placed This Encounter  azithromycin (ZITHROMAX) 250 mg tablet Sig: Take 1 Tab by mouth See Admin Instructions. Take two tablets today then one tablet daily for next 4 days Dispense:  6 Tab Refill:  0  
 
 
 
 
712 Subjective: We discussed the expected course, resolution and complications of the diagnosis(es) in detail. Medication risks, benefits, costs, interactions, and alternatives were discussed as indicated. I advised him to contact the office if his condition worsens, changes or fails to improve as anticipated. He expressed understanding with the diagnosis(es) and plan. Pursuant to the emergency declaration under the [facility], 1135 waiver authority and the Circular and [employer] Act, this Virtual  Visit was conducted, with patient's consent, to reduce the patient's risk of exposure to COVID-19 and provide continuity of care for an established patient. Services were provided through a video synchronous discussion virtually to substitute for in-person clinic visit.  
 
Denton Trevino MD

## 2020-04-22 NOTE — PROGRESS NOTES
HISTORY OF PRESENT ILLNESS Jeannette Young is a 80 y.o. male. Fever The history is provided by the spouse. This is a new problem. The current episode started yesterday. The problem has been resolved. The maximum temperature noted was 102 - 102.9 F. The temperature was taken using an oral thermometer. Associated symptoms include sore throat and mental status change. Pertinent negatives include no chest pain, no cough and no shortness of breath. He has tried acetaminophen for the symptoms. Patient has no known exposures to anyone with coronavirus infection. There has been no travel to the infected countries of [place], [place], [place], [place], or [facility], recently. Patient does not live in assisted living or nursing facility. Review of Systems Constitutional: Positive for fever. HENT: Positive for sore throat. Respiratory: Negative for cough and shortness of breath. Cardiovascular: Negative for chest pain. Social History Socioeconomic History  Marital status:  Spouse name: Not on file  Number of children: 4  
 Years of education: Not on file  Highest education level: Not on file Occupational History Employer: RETIRED Social Needs  Financial resource strain: Not on file  Food insecurity Worry: Not on file Inability: Not on file  Transportation needs Medical: Not on file Non-medical: Not on file Tobacco Use  Smoking status: Never Smoker  Smokeless tobacco: Never Used Substance and Sexual Activity  Alcohol use: No  
  Alcohol/week: 0.0 standard drinks  Drug use: No  
 Sexual activity: Not Currently Lifestyle  Physical activity Days per week: Not on file Minutes per session: Not on file  Stress: Not on file Relationships  Social connections Talks on phone: Not on file Gets together: Not on file Attends [employer] service: Not on file Active member of club or organization: Not on file Attends meetings of clubs or organizations: Not on file Relationship status: Not on file  Intimate partner violence Fear of current or ex partner: Not on file Emotionally abused: Not on file Physically abused: Not on file Forced sexual activity: Not on file Other Topics Concern  Not on file Social History Narrative Lives with wife [address] when she checked it last week Physical Exam 
Rest def ASSESSMENT and PLAN Encounter Diagnoses Name Primary?  Fever, unspecified fever cause Yes  Paroxysmal atrial fibrillation (HCC)  Alzheimer's disease of other onset without behavioral disturbance ([facility] Orders Placed This Encounter  azithromycin (ZITHROMAX) 250 mg tablet Jimmy Jacobo as above Discussed corona as poss cause of his fever. Discussed Sx prompting an ER visit. With their permission we spent some time discussing advanced directives and DNR status. Described what elements are involved and the need to have a designated provider. He is full code.  
 
 
Prn f/u

## 2020-04-22 NOTE — PROGRESS NOTES
Chief Complaint Patient presents with  Fever Good appetite yesterday, little confused, unsteady on feet and bent over, throat sore, flushed, fever 102, gave Tylenol 650mg at 7PM and 4AM, nose running, Patient has not been out of the country in 45-60 days, NO diarrhea, NO cough, NO chest conjestion, NO temp. Pt has not been around anyone with these symptoms. Health Maintenance reviewed. I have reviewed the patient's medical history in detail and updated the computerized patient record. 1. Have you been to the ER, urgent care clinic since your last visit? Hospitalized since your last visit?no 2. Have you seen or consulted any other health care providers outside of the [address] [place] [address] since your last visit? Include any pap smears or colon screening. No 
 
 
Encouraged pt to discuss pt's wishes with spouse/partner/family and bring them in the next appt to follow thru with the Advanced Directive Fall Risk Assessment, last 12 mths 4/22/2020 Able to walk? Yes Fall in past 12 months? No  
Fall with injury? -  
Number of falls in past 12 months - Fall Risk Score -  
 
 
3 most recent PHQ Screens 4/22/2020 Little interest or pleasure in doing things More than half the days Feeling down, depressed, irritable, or hopeless Nearly every day Total Score PHQ 2 5 Abuse Screening Questionnaire 4/22/2020 Do you ever feel afraid of your partner? Kareen Vetr Are you in a relationship with someone who physically or mentally threatens you? Kareen Vetr Is it safe for you to go home? Y  
 
 
ADL Assessment 4/22/2020 Feeding yourself No Help Needed Getting from bed to chair No Help Needed Getting dressed No Help Needed Bathing or showering No Help Needed Walk across the room (includes cane/walker) No Help Needed Using the telphone No Help Needed Taking your medications Help Needed Preparing meals Help Needed Managing money (expenses/bills) Help Needed Moderately strenuous housework (laundry) Help Needed Shopping for personal items (toiletries/medicines) Help Needed Shopping for groceries Help Needed Driving Help Needed Climbing a flight of stairs No Help Needed Getting to places beyond walking distances No Help Needed

## 2020-05-08 NOTE — TELEPHONE ENCOUNTER
Requested Prescriptions     Pending Prescriptions Disp Refills    memantine (NAMENDA) 10 mg tablet 180 Tab 3     Sig: Take 1 Tab by mouth two (2) times a day.  fluticasone propionate (FLONASE) 50 mcg/actuation nasal spray 1 Bottle      Si Sprays by Both Nostrils route daily as needed for Rhinitis.

## 2020-05-12 NOTE — TELEPHONE ENCOUNTER
Pts wife called in reference to the prescriptions that were sent for her  went to the wrong pharmacy. [facility].

## 2020-07-16 NOTE — TELEPHONE ENCOUNTER
Spoke with patient's wife, on HPI. Verified patient with two patient identifiers. States he was told he was doing so well, return in one year. Has Fu appt for March 2021.

## 2020-11-24 PROBLEM — I73.9 PERIPHERAL VASCULAR DISEASE (HCC): Status: ACTIVE | Noted: 2020-01-01

## 2020-11-24 NOTE — PROGRESS NOTES
Chief Complaint Patient presents with  Leg Pain  
  leg locked up and pt couldn't walk  after taking eye drops Health Maintenance reviewed. I have reviewed the patient's medical history in detail and updated the computerized patient record. Patient has not been out of the country in (8-9 months), NO diarrhea, NO cough, NO chest conjestion, NO temp. Pt has not been around anyone with these symptoms. 1. Have you been to the ER, urgent care clinic since your last visit? Hospitalized since your last visit? yes 2. Have you seen or consulted any other health care providers outside of the [address] [place] [address] since your last visit? Include any pap smears or colon screening. Yes Encouraged pt to discuss pt's wishes with spouse/partner/family and bring them in the next appt to follow thru with the Advanced Directive Fall Risk Assessment, last 12 mths 11/24/2020 Able to walk? Yes Fall in past 12 months? No  
Fall with injury? -  
Number of falls in past 12 months - Fall Risk Score -  
 
 
3 most recent PHQ Screens 11/24/2020 Little interest or pleasure in doing things More than half the days Feeling down, depressed, irritable, or hopeless More than half the days Total Score PHQ 2 4 Abuse Screening Questionnaire 11/24/2020 Do you ever feel afraid of your partner? Virk Leader Are you in a relationship with someone who physically or mentally threatens you? Virk Leader Is it safe for you to go home? Y  
 
 
ADL Assessment 11/24/2020 Feeding yourself No Help Needed Getting from bed to chair No Help Needed Getting dressed No Help Needed Bathing or showering No Help Needed Walk across the room (includes cane/walker) No Help Needed Using the telphone No Help Needed Taking your medications Help Needed Preparing meals Help Needed Managing money (expenses/bills) Help Needed Moderately strenuous housework (laundry) Help Needed Shopping for personal items (toiletries/medicines) Help Needed Shopping for groceries Help Needed Driving Help Needed Climbing a flight of stairs No Help Needed Getting to places beyond walking distances Help Needed

## 2020-11-25 NOTE — PROGRESS NOTES
HISTORY OF PRESENT ILLNESS Tommy Rodriguez is a 80 y.o. male. Leg Pain The history is provided by the patient. This is a recurrent problem. The current episode started more than 1 week ago. The problem occurs daily. The problem has not changed since onset. The pain is present in the right lower leg and left lower leg (Not really hurting but his walking is been more difficult somewhat stilted). The patient is experiencing no pain. Associated symptoms include stiffness. Pertinent negatives include no back pain. There has been no history of extremity trauma. Review of Systems Musculoskeletal: Positive for stiffness. Negative for back pain, falls and joint pain. Neurological: Negative for focal weakness. Psychiatric/Behavioral: Negative for depression. Patient Active Problem List  
Diagnosis Code  BPH (benign prostatic hyperplasia) N40.0  Stenosis of carotid artery I65.29  
 Allergic rhinitis J30.9  Alzheimer's disease ([facility] G30.9, F02.80  Abnormal EKG R94.31  
 Dementia of the Alzheimer's type, with late onset, uncomplicated ([facility]) Q51.0, F02.80  Leg weakness R29.898  
 PAF (paroxysmal atrial fibrillation) ([facility]) I48.0  Peripheral vascular disease ([facility]) I73.9 Social History Socioeconomic History  Marital status:  Spouse name: Not on file  Number of children: 4  
 Years of education: Not on file  Highest education level: Not on file Occupational History Employer: RETIRED Social Needs  Financial resource strain: Not on file  Food insecurity Worry: Not on file Inability: Not on file  Transportation needs Medical: Not on file Non-medical: Not on file Tobacco Use  Smoking status: Never Smoker  Smokeless tobacco: Never Used Substance and Sexual Activity  Alcohol use: No  
  Alcohol/week: 0.0 standard drinks  Drug use: No  
 Sexual activity: Not Currently Lifestyle  Physical activity Days per week: Not on file Minutes per session: Not on file  Stress: Not on file Relationships  Social connections Talks on phone: Not on file Gets together: Not on file Attends Yarsanism service: Not on file Active member of club or organization: Not on file Attends meetings of clubs or organizations: Not on file Relationship status: Not on file  Intimate partner violence Fear of current or ex partner: Not on file Emotionally abused: Not on file Physically abused: Not on file Forced sexual activity: Not on file Other Topics Concern  Not on file Social History Narrative Lives with wife Physical Exam 
Visit Vitals BP (!) 166/81 Pulse 75 Temp 97.3 °F (36.3 °C) (Temporal) Resp 16 Ht 5' 8\" (1.727 m) Wt 145 lb (65.8 kg) SpO2 96% BMI 22.05 kg/m² WD WN male NAD Heart RRR without murmers clicks or rubs Lungs CTA Abdo soft nontender Ext no edema Ambulation is a little impaired little stilted no listing focal weakness 2 through 12 are intact. ASSESSMENT and PLAN Encounter Diagnoses Name Primary?  Gait abnormality Yes  Late onset Alzheimer's disease without behavioral disturbance ([address]  Peripheral vascular disease ([facility]  PAF (paroxysmal atrial fibrillation) ([facility]  Encounter for immunization Orders Placed This Encounter  AMB SUPPLY ORDER

## 2020-12-15 NOTE — PROGRESS NOTES
Subjective:  
 
Tami Figueroa is a 80 y.o. male who presents for follow up of hypertension. New concerns: seeing eye doctor having surgery. Av is doing preop. Wife says noting dementia has been worse lately. Diet and Lifestyle: nonsmoker Cardiovascular ROS: taking medications as instructed, no medication side effects noted, no TIA's, no chest pain on exertion, no dyspnea on exertion, no swelling of ankles. Review of Systems, additional: 
Pertinent items are noted in HPI. Patient Active Problem List  
 Diagnosis Date Noted  Peripheral vascular disease ([address] 11/24/2020  Leg weakness 05/22/2019  PAF (paroxysmal atrial fibrillation) ([address] 05/22/2019  Dementia of the Alzheimer's type, with late onset, uncomplicated ([address] 99/83/5407  Abnormal EKG 09/18/2017  Alzheimer's disease ([address] 10/26/2015  Allergic rhinitis 11/13/2014  Stenosis of carotid artery 10/18/2013  Essential hypertension 08/16/2011  BPH (benign prostatic hyperplasia) 08/16/2011 Allergies Allergen Reactions  Pcn [Penicillins] Hives Pt says not allergic  Beef Containing Products Rash Past Medical History:  
Diagnosis Date  BPH (benign prostatic hypertrophy)  Cancer ([address] BASAL ON FACE  Dementia ([address]  ECG abnormal 04/2011 IVCD, RBBB  Hypercholesterolemia  S/P carotid endarterectomy Right Social History Tobacco Use  Smoking status: Never Smoker  Smokeless tobacco: Never Used Substance Use Topics  Alcohol use: No  
  Alcohol/week: 0.0 standard drinks Lab Results Component Value Date/Time  GFR est non-AA >60 05/23/2019 04:43 AM  
 GFR est AA >60 05/23/2019 04:43 AM  
 Creatinine 0.85 05/23/2019 04:43 AM  
 BUN 13 05/23/2019 04:43 AM  
 Sodium 134 (L) 05/23/2019 04:43 AM  
 Potassium 3.9 05/23/2019 04:43 AM  
 Chloride 101 05/23/2019 04:43 AM  
 CO2 27 05/23/2019 04:43 AM  
 Magnesium 2.5 (H) 09/17/2017 11:48 AM  
 
 Lab Results Component Value Date/Time Glucose 89 05/23/2019 04:43 AM  
   
 
 
 
Objective:  
 
Physical exam significant for the following:  
 
Wearing dark glasses Visit Vitals /75 (BP 1 Location: Left arm, BP Patient Position: Sitting) Pulse 73 Temp 97.6 °F (36.4 °C) (Temporal) Resp 16 Ht 5' 8\" (1.727 m) Wt 145 lb (65.8 kg) SpO2 95% BMI 22.05 kg/m² Appearance: alert, well appearing, and in no distress. Dementia General exam: CVS exam BP noted to be well controlled today in office, S1, S2 normal, no gallop, no murmur, chest clear, no JVD, no HSM, no edema. . Motor 5/5 Bi Assessment/Plan:  
 
hypertension well controlled, stable Despite leg weakness has not fallen, declines physical therapy. Nicole Sullivan ICD-10-CM ICD-9-CM 1. Late onset Alzheimer's disease without behavioral disturbance (HCC)  G30.1 331.0 donepeziL (ARICEPT) 5 mg tablet F02.80 294.10   
2. Weakness of both lower extremities  R29.898 729.89   
3. Essential hypertension  I10 401.9 4. PAF (paroxysmal atrial fibrillation) ([facility])  I48.0 427.31 Orders Placed This Encounter  donepeziL (ARICEPT) 5 mg tablet Sig: Take 1 Tab by mouth nightly. Dispense:  90 Tab Refill:  1 Continue Namenda Discussed possible side affects, precautions, and drug interactions and possible benefits of the medication(s). Current Outpatient Medications Medication Sig Dispense Refill  donepeziL (ARICEPT) 5 mg tablet Take 1 Tab by mouth nightly. 90 Tab 1  
 metoprolol succinate (TOPROL-XL) 25 mg XL tablet Take 0.5 Tabs by mouth nightly. 45 Tab 2  
 memantine (NAMENDA) 10 mg tablet Take 1 Tab by mouth two (2) times a day. 180 Tab 3  
 fluticasone propionate (FLONASE) 50 mcg/actuation nasal spray 2 Sprays by Both Nostrils route daily. 3 Bottle 3  prednisoLONE acetate (PRED FORTE) 1 % ophthalmic suspension PLACE 1 DROP INTO BOTH EYES TWICE A DAY IF NEEDED    
  cholecalciferol (VITAMIN D3) 1,000 unit cap Take 2,000 Units by mouth daily.  docusate sodium (STOOL SOFTENER) 100 mg capsule Take 100 mg by mouth two (2) times a day.  OTHER Outpatient PT For deconditioning 1 UNSPECIFIED 0  
 OTHER Take 1 Tab by mouth daily. Prevagen  pramoxine HCl/menthol (ANTI-ITCH EX) by Apply Externally route daily as needed (itch).  carboxymethylcellulose sodium (REFRESH TEARS OP) Administer 1-2 Drops to both eyes two (2) times a day.  acetaminophen/chlorpheniramine (CORICIDIN PO) Take 1 Tab by mouth daily.  ascorbic acid/multivit-min (EMERGEN-C PO) Take 1 Packet by mouth daily as needed (immune boost).  multivitamin (ONE A DAY) tablet Take 1 Tab by mouth daily.  aspirin delayed-release 81 mg tablet Take 81 mg by mouth nightly. Follow-up and Dispositions · Return in about 4 months (around 4/15/2021) for routine follow up.

## 2020-12-15 NOTE — PROGRESS NOTES
Chief Complaint Patient presents with  Leg Pain  
  leg will lock up Health Maintenance reviewed. I have reviewed the patient's medical history in detail and updated the computerized patient record. Patient has not been out of the country in (8-9 months), NO diarrhea, NO cough, NO chest conjestion, NO temp. Pt has not been around anyone with these symptoms. 1. Have you been to the ER, urgent care clinic since your last visit? Hospitalized since your last visit?no 2. Have you seen or consulted any other health care providers outside of the [address] [place] [address] since your last visit? Include any pap smears or colon screening. No 
 
 
Encouraged pt to discuss pt's wishes with spouse/partner/family and bring them in the next appt to follow thru with the Advanced Directive Fall Risk Assessment, last 12 mths 12/15/2020 Able to walk? Yes Fall in past 12 months? No  
Fall with injury? -  
Number of falls in past 12 months - Fall Risk Score -  
 
 
3 most recent PHQ Screens 12/15/2020 Little interest or pleasure in doing things More than half the days Feeling down, depressed, irritable, or hopeless More than half the days Total Score PHQ 2 4 Abuse Screening Questionnaire 12/15/2020 Do you ever feel afraid of your partner? Euell Aurora Are you in a relationship with someone who physically or mentally threatens you? Euell Aurora Is it safe for you to go home? Y  
 
 
ADL Assessment 12/15/2020 Feeding yourself No Help Needed Getting from bed to chair No Help Needed Getting dressed No Help Needed Bathing or showering No Help Needed Walk across the room (includes cane/walker) No Help Needed Using the telphone Help Needed Taking your medications Help Needed Preparing meals Help Needed Managing money (expenses/bills) Help Needed Moderately strenuous housework (laundry) Help Needed Shopping for personal items (toiletries/medicines) Help Needed Shopping for groceries Help Needed Driving Help Needed Climbing a flight of stairs No Help Needed Getting to places beyond walking distances Help Needed

## 2020-12-15 NOTE — PATIENT INSTRUCTIONS
Vaccine Information Statement Influenza (Flu) Vaccine (Inactivated or Recombinant): What You Need to Know Many Vaccine Information Statements are available in Pashto and other languages. See www.immunize.org/vis Hojas de información sobre vacunas están disponibles en español y en muchos otros idiomas. Visite www.immunize.org/vis 1. Why get vaccinated? Influenza vaccine can prevent influenza (flu). Flu is a contagious disease that spreads around the [facility] every year, usually between October and May. Anyone can get the flu, but it is more dangerous for some people. Infants and young children, people 72years of age and older, pregnant women, and people with certain health conditions or a weakened immune system are at greatest risk of flu complications. Pneumonia, bronchitis, sinus infections and ear infections are examples of flu-related complications. If you have a medical condition, such as heart disease, cancer or diabetes, flu can make it worse. Flu can cause fever and chills, sore throat, muscle aches, fatigue, cough, headache, and runny or stuffy nose. Some people may have vomiting and diarrhea, though this is more common in children than adults. Each year thousands of people in the [facility] die from flu, and many more are hospitalized. Flu vaccine prevents millions of illnesses and flu-related visits to the doctor each year. 2. Influenza vaccines CDC recommends everyone 10months of age and older get vaccinated every flu season. Children 6 months through 6years of age may need 2 doses during a single flu season. Everyone else needs only 1 dose each flu season. It takes about 2 weeks for protection to develop after vaccination. There are many flu viruses, and they are always changing. Each year a new flu vaccine is made to protect against three or four viruses that are likely to cause disease in the upcoming flu season. Even when the vaccine doesnt exactly match these viruses, it may still provide some protection. Influenza vaccine does not cause flu. Influenza vaccine may be given at the same time as other vaccines. 3. Talk with your health care provider Tell your vaccine provider if the person getting the vaccine: 
 Has had an allergic reaction after a previous dose of influenza vaccine, or has any severe, life-threatening allergies.  Has ever had Guillain-Barré Syndrome (also called GBS). In some cases, your health care provider may decide to postpone influenza vaccination to a future visit. People with minor illnesses, such as a cold, may be vaccinated. People who are moderately or severely ill should usually wait until they recover before getting influenza vaccine. Your health care provider can give you more information. 4. Risks of a reaction  Soreness, redness, and swelling where shot is given, fever, muscle aches, and headache can happen after influenza vaccine.  There may be a very small increased risk of Guillain-Barré Syndrome (GBS) after inactivated influenza vaccine (the flu shot). Tori Wu children who get the flu shot along with pneumococcal vaccine (PCV13), and/or DTaP vaccine at the same time might be slightly more likely to have a seizure caused by fever. Tell your health care provider if a child who is getting flu vaccine has ever had a seizure. People sometimes faint after medical procedures, including vaccination. Tell your provider if you feel dizzy or have vision changes or ringing in the ears. As with any medicine, there is a very remote chance of a vaccine causing a severe allergic reaction, other serious injury, or death. 5. What if there is a serious problem? An allergic reaction could occur after the vaccinated person leaves the clinic. If you see signs of a severe allergic reaction (hives, swelling of the face and throat, difficulty breathing, a fast heartbeat, dizziness, or weakness), call 9-1-1 and get the person to the nearest hospital. 
 
For other signs that concern you, call your health care provider. Adverse reactions should be reported to the Vaccine Adverse Event Reporting System (VAERS). Your health care provider will usually file this report, or you can do it yourself. Visit the VAERS website at www.vaers. hhs.gov or call 7-744.740.3011. VAERS is only for reporting reactions, and VAERS staff do not give medical advice. 6. The National Vaccine Injury Compensation Program 
 
The [facility] Vaccine Injury Compensation Program (VICP) is a federal program that was created to compensate people who may have been injured by certain vaccines. Visit the VICP website at www.hrsa.gov/vaccinecompensation or call 9-817.244.2221 to learn about the program and about filing a claim. There is a time limit to file a claim for compensation. 7. How can I learn more?  Ask your health care provider.  Call your local or state health department.  Contact the Centers for Disease Control and Prevention (CDC): 
- Call 6-900.109.8830 (1-800-CDC-INFO) or 
- Visit CDCs influenza website at www.cdc.gov/flu Vaccine Information Statement (Interim) Inactivated Influenza Vaccine 8/15/2019 
42 U. Khari Human [id number] Department of Health and SupportLocal Centers for Disease Control and Prevention Office Use Only

## 2020-12-23 NOTE — PROGRESS NOTES
[facility] called asking where the pre op was for patient who was there for eye surgery. All documentation was faxed to cardiologist Dr. Charles Oviedo with confirmation for his office to fill out on 12/9/20. All documentation was filled out by Dr. Toyin Xavier and faxed to [facility] [phone] with confirmation and to [facility] at [phone] with confirmation. Pt's wife call and was told the all documentation was in hand with the nurse as we were speaking.

## 2021-01-01 ENCOUNTER — TELEPHONE (OUTPATIENT)
Dept: INTERNAL MEDICINE CLINIC | Age: 86
End: 2021-01-01

## 2021-01-01 ENCOUNTER — VIRTUAL VISIT (OUTPATIENT)
Dept: INTERNAL MEDICINE CLINIC | Age: 86
End: 2021-01-01
Payer: MEDICARE

## 2021-01-01 ENCOUNTER — IMMUNIZATION (OUTPATIENT)
Dept: PEDIATRICS CLINIC | Age: 86
End: 2021-01-01
Payer: MEDICARE

## 2021-01-01 ENCOUNTER — PATIENT MESSAGE (OUTPATIENT)
Dept: INTERNAL MEDICINE CLINIC | Age: 86
End: 2021-01-01

## 2021-01-01 ENCOUNTER — OFFICE VISIT (OUTPATIENT)
Dept: INTERNAL MEDICINE CLINIC | Age: 86
End: 2021-01-01
Payer: MEDICARE

## 2021-01-01 ENCOUNTER — DOCUMENTATION ONLY (OUTPATIENT)
Dept: INTERNAL MEDICINE CLINIC | Age: 86
End: 2021-01-01

## 2021-01-01 VITALS
DIASTOLIC BLOOD PRESSURE: 77 MMHG | OXYGEN SATURATION: 97 % | BODY MASS INDEX: 22.28 KG/M2 | HEART RATE: 72 BPM | WEIGHT: 147 LBS | HEIGHT: 68 IN | RESPIRATION RATE: 18 BRPM | SYSTOLIC BLOOD PRESSURE: 164 MMHG | TEMPERATURE: 97.3 F

## 2021-01-01 DIAGNOSIS — I10 ESSENTIAL HYPERTENSION: ICD-10-CM

## 2021-01-01 DIAGNOSIS — R29.898 WEAKNESS OF BOTH LOWER EXTREMITIES: ICD-10-CM

## 2021-01-01 DIAGNOSIS — S05.92XS: ICD-10-CM

## 2021-01-01 DIAGNOSIS — G30.1 LATE ONSET ALZHEIMER'S DISEASE WITH BEHAVIORAL DISTURBANCE (HCC): ICD-10-CM

## 2021-01-01 DIAGNOSIS — F02.818 LATE ONSET ALZHEIMER'S DISEASE WITH BEHAVIORAL DISTURBANCE (HCC): Primary | ICD-10-CM

## 2021-01-01 DIAGNOSIS — F02.80 DEMENTIA OF THE ALZHEIMER'S TYPE, WITH LATE ONSET, UNCOMPLICATED (HCC): Primary | ICD-10-CM

## 2021-01-01 DIAGNOSIS — I48.0 PAROXYSMAL ATRIAL FIBRILLATION (HCC): ICD-10-CM

## 2021-01-01 DIAGNOSIS — F02.818 LATE ONSET ALZHEIMER'S DISEASE WITH BEHAVIORAL DISTURBANCE (HCC): ICD-10-CM

## 2021-01-01 DIAGNOSIS — R41.82 ALTERED MENTAL STATUS, UNSPECIFIED ALTERED MENTAL STATUS TYPE: Primary | ICD-10-CM

## 2021-01-01 DIAGNOSIS — G30.1 LATE ONSET ALZHEIMER'S DISEASE WITH BEHAVIORAL DISTURBANCE (HCC): Primary | ICD-10-CM

## 2021-01-01 DIAGNOSIS — G30.1 DEMENTIA OF THE ALZHEIMER'S TYPE, WITH LATE ONSET, UNCOMPLICATED (HCC): Primary | ICD-10-CM

## 2021-01-01 DIAGNOSIS — Z23 ENCOUNTER FOR IMMUNIZATION: Primary | ICD-10-CM

## 2021-01-01 PROCEDURE — G8536 NO DOC ELDER MAL SCRN: HCPCS | Performed by: FAMILY MEDICINE

## 2021-01-01 PROCEDURE — 0011A COVID-19, MRNA, LNP-S, PF, 100MCG/0.5ML DOSE(MODERNA): CPT | Performed by: FAMILY MEDICINE

## 2021-01-01 PROCEDURE — G8420 CALC BMI NORM PARAMETERS: HCPCS | Performed by: FAMILY MEDICINE

## 2021-01-01 PROCEDURE — G8432 DEP SCR NOT DOC, RNG: HCPCS | Performed by: FAMILY MEDICINE

## 2021-01-01 PROCEDURE — 1101F PT FALLS ASSESS-DOCD LE1/YR: CPT | Performed by: FAMILY MEDICINE

## 2021-01-01 PROCEDURE — 91301 COVID-19, MRNA, LNP-S, PF, 100MCG/0.5ML DOSE(MODERNA): CPT | Performed by: FAMILY MEDICINE

## 2021-01-01 PROCEDURE — G8427 DOCREV CUR MEDS BY ELIG CLIN: HCPCS | Performed by: FAMILY MEDICINE

## 2021-01-01 PROCEDURE — 99214 OFFICE O/P EST MOD 30 MIN: CPT | Performed by: FAMILY MEDICINE

## 2021-01-01 PROCEDURE — 0012A COVID-19, MRNA, LNP-S, PF, 100MCG/0.5ML DOSE(MODERNA): CPT | Performed by: FAMILY MEDICINE

## 2021-01-01 PROCEDURE — 99215 OFFICE O/P EST HI 40 MIN: CPT | Performed by: FAMILY MEDICINE

## 2021-02-15 NOTE — PROGRESS NOTES
PROGRESS NOTE SUBJECTIVE: 
Diagnosis/Chief Complaint: Altered mental status (disruptive, combative, wife needs assistance with patient at home) Hx per wife, Here for a ride, Why am I here? What's your name? Agree with comments, see chief complaint. She's contacted hospice. There have been hints of this behavior before but it is rapidly worsened in the last week. Doing well with mood no Symptoms threats, clinging, demanding, orders, 24/7. Wife can't take it any more, feels like he's a threat. Suicidal: no, homicidal, making threats, no gun in the household, there are knives there. Side affects: no 
States taking medications per medicine list.no 
Could not take aricept. Patient Active Problem List  
 Diagnosis Date Noted  Peripheral vascular disease ([facility] 11/24/2020  Leg weakness 05/22/2019  [facility]oxysmal [facility] fibrillation) [facility] 05/22/2019  Dementia of the Alzheimer's type, with late onset, uncomplicated ([facility] 14/05/5292  Abnormal EKG 09/18/2017  Alzheimer's disease ([facility] 10/26/2015  Allergic rhinitis 11/13/2014  Stenosis of carotid artery 10/18/2013  Essential hypertension 08/16/2011  BPH (benign prostatic hyperplasia) 08/16/2011 Current Outpatient Medications Medication Sig Dispense Refill  metoprolol succinate (TOPROL-XL) 25 mg XL tablet Take 0.5 Tabs by mouth nightly. 45 Tab 2  
 memantine (NAMENDA) 10 mg tablet Take 1 Tab by mouth two (2) times a day. 180 Tab 3  
 fluticasone propionate (FLONASE) 50 mcg/actuation nasal spray 2 Sprays by Both Nostrils route daily. 3 Bottle 3  prednisoLONE acetate (PRED FORTE) 1 % ophthalmic suspension PLACE 1 DROP INTO BOTH EYES TWICE A DAY IF NEEDED  cholecalciferol (VITAMIN D3) 1,000 unit cap Take 2,000 Units by mouth daily.  docusate sodium (STOOL SOFTENER) 100 mg capsule Take 100 mg by mouth two (2) times a day.  OTHER Outpatient PT For deconditioning 1 UNSPECIFIED 0  
  OTHER Take 1 Tab by mouth daily. Prevagen  pramoxine HCl/menthol (ANTI-ITCH EX) by Apply Externally route daily as needed (itch).  carboxymethylcellulose sodium (REFRESH TEARS OP) Administer 1-2 Drops to both eyes two (2) times a day.  acetaminophen/chlorpheniramine (CORICIDIN PO) Take 1 Tab by mouth daily.  ascorbic acid/multivit-min (EMERGEN-C PO) Take 1 Packet by mouth daily as needed (immune boost).  multivitamin (ONE A DAY) tablet Take 1 Tab by mouth daily.  aspirin delayed-release 81 mg tablet Take 81 mg by mouth nightly. Allergies Allergen Reactions  Pcn [Penicillins] Hives Pt says not allergic  Beef Containing Products Rash Past Medical History:  
Diagnosis Date  BPH (benign prostatic hypertrophy)  Cancer ([facility] BASAL ON FACE  [facility]  ECG abnormal 04/2011 IVCD, RBBB  Hypercholesterolemia  S/P carotid endarterectomy Right Social History Tobacco Use  Smoking status: Never Smoker  Smokeless tobacco: Never Used Substance Use Topics  Alcohol use: No  
  Alcohol/week: 0.0 standard drinks OBJECTIVE: . 
Visit Vitals BP (!) 164/77 Pulse 72 Temp 97.3 °F (36.3 °C) (Temporal) Resp 18 Ht 5' 8\" (1.727 m) Wt 147 lb (66.7 kg) SpO2 97% BMI 22.35 kg/m² WDWN elderly sitting then gets up, wanders around A little agitated, echolalia Neurological exam[de-identified] 2-12 intact, grossly Psychiatric: Agitated mood, judgement impaired. Pats my leg, you don't want me to take a hatchet to it. Reviewed: Medications, allergies, clinical lab test results and imaging results have been reviewed. Any abnormal findings have been addressed. ASSESSMENT:  
 
  ICD-10-CM ICD-9-CM 1. Altered mental status, unspecified altered mental status type  R41.82 780.97 2. Late onset Alzheimer's disease with behavioral disturbance ([facility]  G30.1 331.0 F02.81 294.11   
3. Essential hypertension  I10 401.9 PLAN 
 
This point only option is really go to the emergency room. I have spoken with the emergency room, doctor told him of the above events. I have known him for 10 years Kathie is usually sweet agreeable person but now he is not. He is unlikely to voluntarily do delirium work-up or be admitted. He does need a delirium work-up. May need to be green warranted. She called his grandson, they left in their vehicles to go to the ER Follow-up as needed

## 2021-02-15 NOTE — PROGRESS NOTES
Chief Complaint Patient presents with  Altered mental status  
  disruptive, combative, wife needs assistance with patient at home Health Maintenance reviewed. I have reviewed the patient's medical history in detail and updated the computerized patient record. Patient has not been out of the country in (12 months), NO diarrhea, NO cough, NO chest conjestion, NO temp. Pt has not been around anyone with these symptoms. 1. Have you been to the ER, urgent care clinic since your last visit? Hospitalized since your last visit?no 2. Have you seen or consulted any other health care providers outside of the [address] [place] [address] since your last visit? Include any pap smears or colon screening. no 
 
 
Encouraged pt to discuss pt's wishes with spouse/partner/family and bring them in the next appt to follow thru with the Advanced Directive Fall Risk Assessment, last 12 mths 2/15/2021 Able to walk? Yes Fall in past 12 months? 0 Do you feel unsteady? 0 Are you worried about falling 0 Number of falls in past 12 months - Fall with injury? -  
 
 
3 most recent PHQ Screens 2/15/2021 Little interest or pleasure in doing things Nearly every day Feeling down, depressed, irritable, or hopeless Nearly every day Total Score PHQ 2 6 Abuse Screening Questionnaire 2/15/2021 Do you ever feel afraid of your partner? Wilner Lawrence Are you in a relationship with someone who physically or mentally threatens you? Wilner Lawrence Is it safe for you to go home? Y  
 
 
ADL Assessment 2/15/2021 Feeding yourself No Help Needed Getting from bed to chair No Help Needed Getting dressed Help Needed Bathing or showering Help Needed Walk across the room (includes cane/walker) No Help Needed Using the telphone Help Needed Taking your medications Help Needed Preparing meals Help Needed Managing money (expenses/bills) Help Needed Moderately strenuous housework (laundry) Help Needed Shopping for personal items (toiletries/medicines) Help Needed Shopping for groceries Help Needed Driving Help Needed Climbing a flight of stairs Help Needed Getting to places beyond walking distances Help Needed

## 2021-02-17 NOTE — TELEPHONE ENCOUNTER
Need an order for PT and L/eye wound care faxed to the .[phone] today.   They are trying to get placement but cannot with out a nursing diagnosis

## 2021-02-17 NOTE — TELEPHONE ENCOUNTER
----- Message from Darlyn Rivero sent at 2/17/2021  9:02 AM EST -----  Regarding: Dr. Gildardo Robles first and last name: Mehul Uriostegui (grandson)  Reason for call: Pt. Is in the Er and needs orders for physical therapy and he has an eye wound that he had surgery for Pt. Says needs order for eye wound care. Callback required yes/no and why: yes, pt. Still in hospital ([facility])  Best contact number(s): [phone]  Details to clarify the request: would like a call today if possible and will  orders if needed.

## 2021-02-25 NOTE — PROGRESS NOTES
Chief Complaint Patient presents with  Documentation  
  nursing home placement  Heartburn Health Maintenance reviewed. I have reviewed the patient's medical history in detail and updated the computerized patient record. Patient has not been out of the country in (12 months), NO diarrhea, NO cough, NO chest conjestion, NO temp. Pt has not been around anyone with these symptoms. 1. Have you been to the ER, urgent care clinic since your last visit? Hospitalized since your last visit? yes 2. Have you seen or consulted any other health care providers outside of the [address] [place] [address] since your last visit? Include any pap smears or colon screening. Yes Encouraged pt to discuss pt's wishes with spouse/partner/family and bring them in the next appt to follow thru with the Advanced Directive Fall Risk Assessment, last 12 mths 2/25/2021 Able to walk? Yes Fall in past 12 months? 0 Do you feel unsteady? 0 Are you worried about falling 0 Number of falls in past 12 months - Fall with injury? -  
 
 
3 most recent PHQ Screens 2/25/2021 Little interest or pleasure in doing things Nearly every day Feeling down, depressed, irritable, or hopeless Nearly every day Total Score PHQ 2 6 Abuse Screening Questionnaire 2/25/2021 Do you ever feel afraid of your partner? Alvester Fermo Are you in a relationship with someone who physically or mentally threatens you? Alvester Fermo Is it safe for you to go home? Y  
 
 
ADL Assessment 2/25/2021 Feeding yourself No Help Needed Getting from bed to chair No Help Needed Getting dressed Help Needed Bathing or showering Help Needed Walk across the room (includes cane/walker) Help Needed Using the telphone Help Needed Taking your medications Help Needed Preparing meals Help Needed Managing money (expenses/bills) Help Needed Moderately strenuous housework (laundry) Help Needed Shopping for personal items (toiletries/medicines) Help Needed Shopping for groceries Help Needed Driving Help Needed Climbing a flight of stairs Help Needed Getting to places beyond walking distances Help Needed

## 2021-02-25 NOTE — PROGRESS NOTES
Faxed to [facility] at [address] [place] [address] with confirmation pt's admission documentation   On 2/25/21 @2:47PM

## 2021-02-25 NOTE — PROGRESS NOTES
Keyanna Levin is a 80 y.o. male who was phone evaluated on 2/25/2021. Consent: 
He and/or his healthcare decision maker is aware that this patient-initiated Telehealth encounter is a billable service, with coverage as determined by his insurance carrier. He is aware that he may receive a bill and has provided verbal consent to proceed: Yes I was in the office while conducting this encounter. Assessment & Plan: ICD-10-CM ICD-9-CM 1. Late onset Alzheimer's disease with behavioral disturbance ([facility] [address]  G30.1 331.0 F02.81 294.11   
2. Essential hypertension  I10 401.9 3. Weakness of both lower extremities  R29.898 729.89   
4. Paroxysmal atrial fibrillation (HCC)  I48.0 427.31 Filled out paperwork in regards to him going to a nursing home for placement, see scanned sheet. About 30 minutes was spent discussing the situation with his wife. [address] Subjective:  
  
Patient was seen 2/15/2021, wife was concerned about his agitation, clinging behavior, threats. It was causing a lot of distress with his wife. This history is per wife. Did make a threat to me while I saw him. They were referred to the emergency room at [facility]. He underwent a work-up there. Reviewed. No cause for his agitation was found. There was some inappropriate behavior in the emergency room but it settled down. All patient was prescribed Risperdal as needed haloperidol. It did calm him down enough to do a reasonable evaluation of his behavioral changes. Telepsychiatry referral done. No beds available at psychiatric facilities. Seem to calm down the above medications and it was felt to be safe to send him home with home health. Did reasonably well at first, nurses came to assist his wife with his ADLs. Was not too agitated did not seem to threaten I previously. Was given Risperdal as prescribed unclear to me whether haloperidol had to be prescribed. In the last day or so however wife says he is no longer able to have the use of his legs. Has been evaluated for nursing home placement. They have agreed to take him, I filled out the paperwork, see scanned sheet. Medications will need to be reevaluated once he gets to the nursing home. We discussed the expected course, resolution and complications of the diagnosis(es) in detail. Medication risks, benefits, costs, interactions, and alternatives were discussed as indicated. I advised him to contact the office if his condition worsens, changes or fails to improve as anticipated. He expressed understanding with the diagnosis(es) and plan. Pursuant to the emergency declaration under the [facility], [facility] waiver authority and the [employer] Act, this Virtual  Visit was conducted, with patient's consent, to reduce the patient's risk of exposure to COVID-19 and provide continuity of care for an established patient. Services were provided through a video synchronous discussion virtually to substitute for in-person clinic visit.  
 
Meryl Castle MD

## 2021-02-27 NOTE — TELEPHONE ENCOUNTER
ON CALL NOTE    Received a call from Kushal Villagomez RN that  needs to be placed in Hospice care. Asked for H&P to be faxed and order written for hospice. Attempted to send note from Dr. Josie Rios on 2/15/21 via [facility] but they were unable to open it. Sent second email with hospice order included in it. Encouraged nurse/ family to sign up for [facility] and then they may be able to view note that is required by agency. Mariam Persaud RN stated she can try to get note over weekend and have it faxed per Mother, Henrique Curiel LPN who works at [employer].

## 2021-04-02 NOTE — TELEPHONE ENCOUNTER
FYI - Patient's grandson and wife called asking Dr. Jazmín Feliz to fax to Braydon Montes with Hospice a referral for Hospice to [phone] with confirmation with Seble Iverson from Dr Jazmín Feliz.

## (undated) DEVICE — SOLUTION IV 250ML 0.9% SOD CHL CLR INJ FLX BG CONT PRT CLSR

## (undated) DEVICE — HIGH FLOW RATE EXTENSION SET, LUER LOCK ADAPTERS

## (undated) DEVICE — SOLUTION IV STRL H2O 500 ML AQUALITE POUR BTL

## (undated) DEVICE — SURGICAL PROCEDURE PACK EYE CUST DR CHNDLR

## (undated) DEVICE — SYR 3ML LL TIP 1/10ML GRAD --

## (undated) DEVICE — STERILE POLYISOPRENE POWDER-FREE SURGICAL GLOVES: Brand: PROTEXIS

## (undated) DEVICE — SYR 5ML 1/5 GRAD LL NSAF LF --

## (undated) DEVICE — SURGICAL PROCEDURE PACK CATRCT CUST

## (undated) DEVICE — THE MONARCH® "D" CARTRIDGE IS A SINGLE-USE POLYPROPYLENE CARTRIDGE FOR POSTERIOR CHAMBER IOL DELIVERY: Brand: MONARCH® III

## (undated) DEVICE — TOWEL SURG W17XL27IN STD BLU COT NONFENESTRATED PREWASHED

## (undated) DEVICE — 3M™ TEGADERM™ TRANSPARENT FILM DRESSING FRAME STYLE, 1624W, 2-3/8 IN X 2-3/4 IN (6 CM X 7 CM), 100/CT 4CT/CASE: Brand: 3M™ TEGADERM™

## (undated) DEVICE — KENDALL DL ECG CABLE AND LEAD WIRE SYSTEM, 3-LEAD, SINGLE PATIENT USE: Brand: KENDALL

## (undated) DEVICE — RESURE SEALANT

## (undated) DEVICE — NDL FLTR TIP 5 MIC 18GX1.5IN --

## (undated) DEVICE — COTTON TIPPED APPLICATORS: Brand: DEROYAL